# Patient Record
Sex: FEMALE | Race: WHITE | NOT HISPANIC OR LATINO
[De-identification: names, ages, dates, MRNs, and addresses within clinical notes are randomized per-mention and may not be internally consistent; named-entity substitution may affect disease eponyms.]

---

## 2019-12-02 PROBLEM — Z00.00 ENCOUNTER FOR PREVENTIVE HEALTH EXAMINATION: Status: ACTIVE | Noted: 2019-12-02

## 2019-12-04 ENCOUNTER — APPOINTMENT (OUTPATIENT)
Dept: SURGERY | Facility: CLINIC | Age: 59
End: 2019-12-04
Payer: COMMERCIAL

## 2019-12-04 VITALS
DIASTOLIC BLOOD PRESSURE: 84 MMHG | WEIGHT: 110 LBS | BODY MASS INDEX: 18.78 KG/M2 | HEIGHT: 64 IN | SYSTOLIC BLOOD PRESSURE: 123 MMHG | HEART RATE: 97 BPM

## 2019-12-04 DIAGNOSIS — F17.200 NICOTINE DEPENDENCE, UNSPECIFIED, UNCOMPLICATED: ICD-10-CM

## 2019-12-04 DIAGNOSIS — Z90.49 ACQUIRED ABSENCE OF OTHER SPECIFIED PARTS OF DIGESTIVE TRACT: ICD-10-CM

## 2019-12-04 PROCEDURE — 99024 POSTOP FOLLOW-UP VISIT: CPT

## 2019-12-04 NOTE — HISTORY OF PRESENT ILLNESS
[de-identified] : Patient returns s/p lap cholecystectomy for chronic cholecystitis on 11/21/19. She is doing well without fevers, chills, nausea, vomiting, diarrhea or pain. Her energy is normal.

## 2019-12-04 NOTE — PHYSICAL EXAM
[Alert] : alert [Calm] : calm [de-identified] : NAD, well-appearing [de-identified] : soft, NT ND with well-healing incisions without signs of infection or hernia

## 2022-01-01 ENCOUNTER — RESULT REVIEW (OUTPATIENT)
Age: 62
End: 2022-01-01

## 2022-01-01 ENCOUNTER — APPOINTMENT (OUTPATIENT)
Dept: RADIATION ONCOLOGY | Facility: CLINIC | Age: 62
End: 2022-01-01

## 2022-01-01 ENCOUNTER — TRANSCRIPTION ENCOUNTER (OUTPATIENT)
Age: 62
End: 2022-01-01

## 2022-01-01 ENCOUNTER — APPOINTMENT (OUTPATIENT)
Dept: ORTHOPEDIC SURGERY | Facility: HOSPITAL | Age: 62
End: 2022-01-01

## 2022-01-01 ENCOUNTER — INPATIENT (INPATIENT)
Facility: HOSPITAL | Age: 62
LOS: 14 days | Discharge: NURSING, REHAB OR LONG-TERM CARE | DRG: 470 | End: 2022-08-12
Attending: ORTHOPAEDIC SURGERY | Admitting: ORTHOPAEDIC SURGERY
Payer: COMMERCIAL

## 2022-01-01 ENCOUNTER — NON-APPOINTMENT (OUTPATIENT)
Age: 62
End: 2022-01-01

## 2022-01-01 ENCOUNTER — APPOINTMENT (OUTPATIENT)
Dept: THORACIC SURGERY | Facility: HOSPITAL | Age: 62
End: 2022-01-01

## 2022-01-01 ENCOUNTER — APPOINTMENT (OUTPATIENT)
Dept: GERIATRICS | Facility: CLINIC | Age: 62
End: 2022-01-01

## 2022-01-01 VITALS
DIASTOLIC BLOOD PRESSURE: 87 MMHG | TEMPERATURE: 98 F | HEART RATE: 114 BPM | HEIGHT: 62 IN | SYSTOLIC BLOOD PRESSURE: 126 MMHG | RESPIRATION RATE: 18 BRPM | OXYGEN SATURATION: 93 % | WEIGHT: 123.02 LBS

## 2022-01-01 VITALS — OXYGEN SATURATION: 95 % | HEART RATE: 112 BPM | TEMPERATURE: 98.6 F | RESPIRATION RATE: 18 BRPM

## 2022-01-01 VITALS
RESPIRATION RATE: 18 BRPM | DIASTOLIC BLOOD PRESSURE: 74 MMHG | HEART RATE: 109 BPM | OXYGEN SATURATION: 97 % | TEMPERATURE: 98 F | SYSTOLIC BLOOD PRESSURE: 111 MMHG

## 2022-01-01 VITALS
TEMPERATURE: 98 F | RESPIRATION RATE: 16 BRPM | HEART RATE: 93 BPM | SYSTOLIC BLOOD PRESSURE: 107 MMHG | OXYGEN SATURATION: 99 % | DIASTOLIC BLOOD PRESSURE: 66 MMHG

## 2022-01-01 DIAGNOSIS — C79.31 SECONDARY MALIGNANT NEOPLASM OF BRAIN: ICD-10-CM

## 2022-01-01 DIAGNOSIS — Z51.5 ENCOUNTER FOR PALLIATIVE CARE: ICD-10-CM

## 2022-01-01 DIAGNOSIS — C34.90 MALIGNANT NEOPLASM OF UNSPECIFIED PART OF UNSPECIFIED BRONCHUS OR LUNG: ICD-10-CM

## 2022-01-01 DIAGNOSIS — S72.001A FRACTURE OF UNSPECIFIED PART OF NECK OF RIGHT FEMUR, INITIAL ENCOUNTER FOR CLOSED FRACTURE: ICD-10-CM

## 2022-01-01 DIAGNOSIS — R91.8 OTHER NONSPECIFIC ABNORMAL FINDING OF LUNG FIELD: ICD-10-CM

## 2022-01-01 DIAGNOSIS — R53.81 OTHER MALAISE: ICD-10-CM

## 2022-01-01 DIAGNOSIS — K92.0 HEMATEMESIS: ICD-10-CM

## 2022-01-01 DIAGNOSIS — R00.0 TACHYCARDIA, UNSPECIFIED: ICD-10-CM

## 2022-01-01 DIAGNOSIS — K59.00 CONSTIPATION, UNSPECIFIED: ICD-10-CM

## 2022-01-01 DIAGNOSIS — Z47.89 ENCOUNTER FOR OTHER ORTHOPEDIC AFTERCARE: ICD-10-CM

## 2022-01-01 DIAGNOSIS — Z78.9 OTHER SPECIFIED HEALTH STATUS: ICD-10-CM

## 2022-01-01 DIAGNOSIS — R33.9 RETENTION OF URINE, UNSPECIFIED: ICD-10-CM

## 2022-01-01 DIAGNOSIS — C79.51 SECONDARY MALIGNANT NEOPLASM OF BONE: ICD-10-CM

## 2022-01-01 DIAGNOSIS — R52 PAIN, UNSPECIFIED: ICD-10-CM

## 2022-01-01 DIAGNOSIS — Z87.442 PERSONAL HISTORY OF URINARY CALCULI: ICD-10-CM

## 2022-01-01 DIAGNOSIS — M89.9 DISORDER OF BONE, UNSPECIFIED: ICD-10-CM

## 2022-01-01 LAB
-  AMIKACIN: SIGNIFICANT CHANGE UP
-  AMOXICILLIN/CLAVULANIC ACID: SIGNIFICANT CHANGE UP
-  AMPICILLIN/SULBACTAM: SIGNIFICANT CHANGE UP
-  AMPICILLIN: SIGNIFICANT CHANGE UP
-  AZTREONAM: SIGNIFICANT CHANGE UP
-  CEFAZOLIN: SIGNIFICANT CHANGE UP
-  CEFEPIME: SIGNIFICANT CHANGE UP
-  CEFOXITIN: SIGNIFICANT CHANGE UP
-  CEFTRIAXONE: SIGNIFICANT CHANGE UP
-  CIPROFLOXACIN: SIGNIFICANT CHANGE UP
-  ERTAPENEM: SIGNIFICANT CHANGE UP
-  GENTAMICIN: SIGNIFICANT CHANGE UP
-  IMIPENEM: SIGNIFICANT CHANGE UP
-  LEVOFLOXACIN: SIGNIFICANT CHANGE UP
-  MEROPENEM: SIGNIFICANT CHANGE UP
-  NITROFURANTOIN: SIGNIFICANT CHANGE UP
-  PIPERACILLIN/TAZOBACTAM: SIGNIFICANT CHANGE UP
-  TIGECYCLINE: SIGNIFICANT CHANGE UP
-  TOBRAMYCIN: SIGNIFICANT CHANGE UP
-  TRIMETHOPRIM/SULFAMETHOXAZOLE: SIGNIFICANT CHANGE UP
24R-OH-CALCIDIOL SERPL-MCNC: 29.5 NG/ML — LOW (ref 30–80)
ALBUMIN SERPL ELPH-MCNC: 2.8 G/DL — LOW (ref 3.3–5)
ALBUMIN SERPL ELPH-MCNC: 3.3 G/DL — SIGNIFICANT CHANGE UP (ref 3.3–5)
ALP SERPL-CCNC: 247 U/L — HIGH (ref 40–120)
ALT FLD-CCNC: 58 U/L — HIGH (ref 10–45)
ANION GAP SERPL CALC-SCNC: 11 MMOL/L — SIGNIFICANT CHANGE UP (ref 5–17)
ANION GAP SERPL CALC-SCNC: 13 MMOL/L — SIGNIFICANT CHANGE UP (ref 5–17)
ANION GAP SERPL CALC-SCNC: 13 MMOL/L — SIGNIFICANT CHANGE UP (ref 5–17)
ANION GAP SERPL CALC-SCNC: 14 MMOL/L — SIGNIFICANT CHANGE UP (ref 5–17)
ANION GAP SERPL CALC-SCNC: 15 MMOL/L — SIGNIFICANT CHANGE UP (ref 5–17)
ANION GAP SERPL CALC-SCNC: 7 MMOL/L — SIGNIFICANT CHANGE UP (ref 5–17)
ANION GAP SERPL CALC-SCNC: 8 MMOL/L — SIGNIFICANT CHANGE UP (ref 5–17)
ANION GAP SERPL CALC-SCNC: 8 MMOL/L — SIGNIFICANT CHANGE UP (ref 5–17)
APPEARANCE UR: CLEAR — SIGNIFICANT CHANGE UP
APTT BLD: 26.3 SEC — LOW (ref 27.5–35.5)
APTT BLD: 28.2 SEC — SIGNIFICANT CHANGE UP (ref 27.5–35.5)
APTT BLD: 32.6 SEC — SIGNIFICANT CHANGE UP (ref 27.5–35.5)
APTT BLD: 33.2 SEC — SIGNIFICANT CHANGE UP (ref 27.5–35.5)
AST SERPL-CCNC: 44 U/L — HIGH (ref 10–40)
BACTERIA # UR AUTO: ABNORMAL
BILIRUB SERPL-MCNC: 0.3 MG/DL — SIGNIFICANT CHANGE UP (ref 0.2–1.2)
BILIRUB UR-MCNC: NEGATIVE — SIGNIFICANT CHANGE UP
BLD GP AB SCN SERPL QL: NEGATIVE — SIGNIFICANT CHANGE UP
BLD GP AB SCN SERPL QL: NEGATIVE — SIGNIFICANT CHANGE UP
BUN SERPL-MCNC: 10 MG/DL — SIGNIFICANT CHANGE UP (ref 7–23)
BUN SERPL-MCNC: 11 MG/DL — SIGNIFICANT CHANGE UP (ref 7–23)
BUN SERPL-MCNC: 11 MG/DL — SIGNIFICANT CHANGE UP (ref 7–23)
BUN SERPL-MCNC: 14 MG/DL — SIGNIFICANT CHANGE UP (ref 7–23)
BUN SERPL-MCNC: 16 MG/DL — SIGNIFICANT CHANGE UP (ref 7–23)
BUN SERPL-MCNC: 18 MG/DL — SIGNIFICANT CHANGE UP (ref 7–23)
BUN SERPL-MCNC: 21 MG/DL — SIGNIFICANT CHANGE UP (ref 7–23)
BUN SERPL-MCNC: 22 MG/DL — SIGNIFICANT CHANGE UP (ref 7–23)
BUN SERPL-MCNC: 22 MG/DL — SIGNIFICANT CHANGE UP (ref 7–23)
BUN SERPL-MCNC: 29 MG/DL — HIGH (ref 7–23)
BUN SERPL-MCNC: 9 MG/DL — SIGNIFICANT CHANGE UP (ref 7–23)
CALCIUM SERPL-MCNC: 7.7 MG/DL — LOW (ref 8.4–10.5)
CALCIUM SERPL-MCNC: 7.8 MG/DL — LOW (ref 8.4–10.5)
CALCIUM SERPL-MCNC: 7.8 MG/DL — LOW (ref 8.4–10.5)
CALCIUM SERPL-MCNC: 8 MG/DL — LOW (ref 8.4–10.5)
CALCIUM SERPL-MCNC: 8.2 MG/DL — LOW (ref 8.4–10.5)
CALCIUM SERPL-MCNC: 8.2 MG/DL — LOW (ref 8.4–10.5)
CALCIUM SERPL-MCNC: 8.8 MG/DL — SIGNIFICANT CHANGE UP (ref 8.4–10.5)
CALCIUM SERPL-MCNC: 9.2 MG/DL — SIGNIFICANT CHANGE UP (ref 8.4–10.5)
CALCIUM SERPL-MCNC: 9.2 MG/DL — SIGNIFICANT CHANGE UP (ref 8.4–10.5)
CHLORIDE SERPL-SCNC: 100 MMOL/L — SIGNIFICANT CHANGE UP (ref 96–108)
CHLORIDE SERPL-SCNC: 92 MMOL/L — LOW (ref 96–108)
CHLORIDE SERPL-SCNC: 94 MMOL/L — LOW (ref 96–108)
CHLORIDE SERPL-SCNC: 94 MMOL/L — LOW (ref 96–108)
CHLORIDE SERPL-SCNC: 95 MMOL/L — LOW (ref 96–108)
CHLORIDE SERPL-SCNC: 95 MMOL/L — LOW (ref 96–108)
CHLORIDE SERPL-SCNC: 96 MMOL/L — SIGNIFICANT CHANGE UP (ref 96–108)
CHLORIDE SERPL-SCNC: 96 MMOL/L — SIGNIFICANT CHANGE UP (ref 96–108)
CHLORIDE SERPL-SCNC: 97 MMOL/L — SIGNIFICANT CHANGE UP (ref 96–108)
CHLORIDE SERPL-SCNC: 97 MMOL/L — SIGNIFICANT CHANGE UP (ref 96–108)
CHLORIDE SERPL-SCNC: 99 MMOL/L — SIGNIFICANT CHANGE UP (ref 96–108)
CO2 SERPL-SCNC: 22 MMOL/L — SIGNIFICANT CHANGE UP (ref 22–31)
CO2 SERPL-SCNC: 25 MMOL/L — SIGNIFICANT CHANGE UP (ref 22–31)
CO2 SERPL-SCNC: 26 MMOL/L — SIGNIFICANT CHANGE UP (ref 22–31)
CO2 SERPL-SCNC: 26 MMOL/L — SIGNIFICANT CHANGE UP (ref 22–31)
CO2 SERPL-SCNC: 27 MMOL/L — SIGNIFICANT CHANGE UP (ref 22–31)
CO2 SERPL-SCNC: 27 MMOL/L — SIGNIFICANT CHANGE UP (ref 22–31)
CO2 SERPL-SCNC: 28 MMOL/L — SIGNIFICANT CHANGE UP (ref 22–31)
CO2 SERPL-SCNC: 29 MMOL/L — SIGNIFICANT CHANGE UP (ref 22–31)
CO2 SERPL-SCNC: 31 MMOL/L — SIGNIFICANT CHANGE UP (ref 22–31)
CO2 SERPL-SCNC: 31 MMOL/L — SIGNIFICANT CHANGE UP (ref 22–31)
CO2 SERPL-SCNC: 37 MMOL/L — HIGH (ref 22–31)
COLOR SPEC: SIGNIFICANT CHANGE UP
CREAT SERPL-MCNC: 0.32 MG/DL — LOW (ref 0.5–1.3)
CREAT SERPL-MCNC: 0.37 MG/DL — LOW (ref 0.5–1.3)
CREAT SERPL-MCNC: 0.38 MG/DL — LOW (ref 0.5–1.3)
CREAT SERPL-MCNC: 0.41 MG/DL — LOW (ref 0.5–1.3)
CREAT SERPL-MCNC: 0.42 MG/DL — LOW (ref 0.5–1.3)
CREAT SERPL-MCNC: 0.45 MG/DL — LOW (ref 0.5–1.3)
CREAT SERPL-MCNC: 0.49 MG/DL — LOW (ref 0.5–1.3)
CREAT SERPL-MCNC: 0.53 MG/DL — SIGNIFICANT CHANGE UP (ref 0.5–1.3)
CREAT SERPL-MCNC: <0.3 MG/DL — LOW (ref 0.5–1.3)
CULTURE RESULTS: SIGNIFICANT CHANGE UP
DIFF PNL FLD: ABNORMAL
EGFR: 104 ML/MIN/1.73M2 — SIGNIFICANT CHANGE UP
EGFR: 106 ML/MIN/1.73M2 — SIGNIFICANT CHANGE UP
EGFR: 109 ML/MIN/1.73M2 — SIGNIFICANT CHANGE UP
EGFR: 111 ML/MIN/1.73M2 — SIGNIFICANT CHANGE UP
EGFR: 111 ML/MIN/1.73M2 — SIGNIFICANT CHANGE UP
EGFR: 113 ML/MIN/1.73M2 — SIGNIFICANT CHANGE UP
EGFR: 114 ML/MIN/1.73M2 — SIGNIFICANT CHANGE UP
EGFR: 118 ML/MIN/1.73M2 — SIGNIFICANT CHANGE UP
EGFR: 120 ML/MIN/1.73M2 — SIGNIFICANT CHANGE UP
EPI CELLS # UR: 0 /HPF — SIGNIFICANT CHANGE UP
GAS PNL BLDA: SIGNIFICANT CHANGE UP
GLUCOSE BLDC GLUCOMTR-MCNC: 180 MG/DL — HIGH (ref 70–99)
GLUCOSE SERPL-MCNC: 106 MG/DL — HIGH (ref 70–99)
GLUCOSE SERPL-MCNC: 107 MG/DL — HIGH (ref 70–99)
GLUCOSE SERPL-MCNC: 108 MG/DL — HIGH (ref 70–99)
GLUCOSE SERPL-MCNC: 115 MG/DL — HIGH (ref 70–99)
GLUCOSE SERPL-MCNC: 115 MG/DL — HIGH (ref 70–99)
GLUCOSE SERPL-MCNC: 123 MG/DL — HIGH (ref 70–99)
GLUCOSE SERPL-MCNC: 124 MG/DL — HIGH (ref 70–99)
GLUCOSE SERPL-MCNC: 128 MG/DL — HIGH (ref 70–99)
GLUCOSE SERPL-MCNC: 137 MG/DL — HIGH (ref 70–99)
GLUCOSE SERPL-MCNC: 141 MG/DL — HIGH (ref 70–99)
GLUCOSE SERPL-MCNC: 163 MG/DL — HIGH (ref 70–99)
GLUCOSE UR QL: NEGATIVE — SIGNIFICANT CHANGE UP
HCT VFR BLD CALC: 25.9 % — LOW (ref 34.5–45)
HCT VFR BLD CALC: 25.9 % — LOW (ref 34.5–45)
HCT VFR BLD CALC: 26.6 % — LOW (ref 34.5–45)
HCT VFR BLD CALC: 27 % — LOW (ref 34.5–45)
HCT VFR BLD CALC: 28 % — LOW (ref 34.5–45)
HCT VFR BLD CALC: 28.4 % — LOW (ref 34.5–45)
HCT VFR BLD CALC: 28.9 % — LOW (ref 34.5–45)
HCT VFR BLD CALC: 29.6 % — LOW (ref 34.5–45)
HCT VFR BLD CALC: 29.8 % — LOW (ref 34.5–45)
HCT VFR BLD CALC: 32 % — LOW (ref 34.5–45)
HCT VFR BLD CALC: 32.4 % — LOW (ref 34.5–45)
HCT VFR BLD CALC: 33.7 % — LOW (ref 34.5–45)
HCV AB S/CO SERPL IA: 0.1 S/CO — SIGNIFICANT CHANGE UP (ref 0–0.99)
HCV AB SERPL-IMP: SIGNIFICANT CHANGE UP
HGB BLD-MCNC: 10.2 G/DL — LOW (ref 11.5–15.5)
HGB BLD-MCNC: 10.3 G/DL — LOW (ref 11.5–15.5)
HGB BLD-MCNC: 10.7 G/DL — LOW (ref 11.5–15.5)
HGB BLD-MCNC: 8.2 G/DL — LOW (ref 11.5–15.5)
HGB BLD-MCNC: 8.3 G/DL — LOW (ref 11.5–15.5)
HGB BLD-MCNC: 8.6 G/DL — LOW (ref 11.5–15.5)
HGB BLD-MCNC: 8.7 G/DL — LOW (ref 11.5–15.5)
HGB BLD-MCNC: 9 G/DL — LOW (ref 11.5–15.5)
HGB BLD-MCNC: 9.1 G/DL — LOW (ref 11.5–15.5)
HGB BLD-MCNC: 9.2 G/DL — LOW (ref 11.5–15.5)
HGB BLD-MCNC: 9.4 G/DL — LOW (ref 11.5–15.5)
HGB BLD-MCNC: 9.8 G/DL — LOW (ref 11.5–15.5)
HYALINE CASTS # UR AUTO: 1 /LPF — SIGNIFICANT CHANGE UP (ref 0–2)
INR BLD: 1.48 RATIO — HIGH (ref 0.88–1.16)
INR BLD: 1.5 RATIO — HIGH (ref 0.88–1.16)
INR BLD: 1.52 RATIO — HIGH (ref 0.88–1.16)
INR BLD: 1.53 RATIO — HIGH (ref 0.88–1.16)
KETONES UR-MCNC: NEGATIVE — SIGNIFICANT CHANGE UP
LEUKOCYTE ESTERASE UR-ACNC: ABNORMAL
MCHC RBC-ENTMCNC: 28.5 PG — SIGNIFICANT CHANGE UP (ref 27–34)
MCHC RBC-ENTMCNC: 28.9 PG — SIGNIFICANT CHANGE UP (ref 27–34)
MCHC RBC-ENTMCNC: 29.4 PG — SIGNIFICANT CHANGE UP (ref 27–34)
MCHC RBC-ENTMCNC: 29.5 PG — SIGNIFICANT CHANGE UP (ref 27–34)
MCHC RBC-ENTMCNC: 29.7 PG — SIGNIFICANT CHANGE UP (ref 27–34)
MCHC RBC-ENTMCNC: 29.8 PG — SIGNIFICANT CHANGE UP (ref 27–34)
MCHC RBC-ENTMCNC: 29.8 PG — SIGNIFICANT CHANGE UP (ref 27–34)
MCHC RBC-ENTMCNC: 30.1 PG — SIGNIFICANT CHANGE UP (ref 27–34)
MCHC RBC-ENTMCNC: 30.1 PG — SIGNIFICANT CHANGE UP (ref 27–34)
MCHC RBC-ENTMCNC: 31.5 GM/DL — LOW (ref 32–36)
MCHC RBC-ENTMCNC: 31.5 GM/DL — LOW (ref 32–36)
MCHC RBC-ENTMCNC: 31.7 GM/DL — LOW (ref 32–36)
MCHC RBC-ENTMCNC: 31.8 GM/DL — LOW (ref 32–36)
MCHC RBC-ENTMCNC: 31.8 GM/DL — LOW (ref 32–36)
MCHC RBC-ENTMCNC: 32 GM/DL — SIGNIFICANT CHANGE UP (ref 32–36)
MCHC RBC-ENTMCNC: 32 GM/DL — SIGNIFICANT CHANGE UP (ref 32–36)
MCHC RBC-ENTMCNC: 32.1 GM/DL — SIGNIFICANT CHANGE UP (ref 32–36)
MCHC RBC-ENTMCNC: 32.2 GM/DL — SIGNIFICANT CHANGE UP (ref 32–36)
MCHC RBC-ENTMCNC: 32.2 GM/DL — SIGNIFICANT CHANGE UP (ref 32–36)
MCHC RBC-ENTMCNC: 32.3 GM/DL — SIGNIFICANT CHANGE UP (ref 32–36)
MCHC RBC-ENTMCNC: 33.1 GM/DL — SIGNIFICANT CHANGE UP (ref 32–36)
MCV RBC AUTO: 88.5 FL — SIGNIFICANT CHANGE UP (ref 80–100)
MCV RBC AUTO: 88.6 FL — SIGNIFICANT CHANGE UP (ref 80–100)
MCV RBC AUTO: 89.9 FL — SIGNIFICANT CHANGE UP (ref 80–100)
MCV RBC AUTO: 90.3 FL — SIGNIFICANT CHANGE UP (ref 80–100)
MCV RBC AUTO: 90.8 FL — SIGNIFICANT CHANGE UP (ref 80–100)
MCV RBC AUTO: 91.6 FL — SIGNIFICANT CHANGE UP (ref 80–100)
MCV RBC AUTO: 91.8 FL — SIGNIFICANT CHANGE UP (ref 80–100)
MCV RBC AUTO: 91.8 FL — SIGNIFICANT CHANGE UP (ref 80–100)
MCV RBC AUTO: 92 FL — SIGNIFICANT CHANGE UP (ref 80–100)
MCV RBC AUTO: 92.8 FL — SIGNIFICANT CHANGE UP (ref 80–100)
MCV RBC AUTO: 94.2 FL — SIGNIFICANT CHANGE UP (ref 80–100)
MCV RBC AUTO: 94.4 FL — SIGNIFICANT CHANGE UP (ref 80–100)
METHOD TYPE: SIGNIFICANT CHANGE UP
NITRITE UR-MCNC: NEGATIVE — SIGNIFICANT CHANGE UP
NRBC # BLD: 0 /100 WBCS — SIGNIFICANT CHANGE UP (ref 0–0)
ORGANISM # SPEC MICROSCOPIC CNT: SIGNIFICANT CHANGE UP
ORGANISM # SPEC MICROSCOPIC CNT: SIGNIFICANT CHANGE UP
PH UR: 7.5 — SIGNIFICANT CHANGE UP (ref 5–8)
PLATELET # BLD AUTO: 316 K/UL — SIGNIFICANT CHANGE UP (ref 150–400)
PLATELET # BLD AUTO: 347 K/UL — SIGNIFICANT CHANGE UP (ref 150–400)
PLATELET # BLD AUTO: 385 K/UL — SIGNIFICANT CHANGE UP (ref 150–400)
PLATELET # BLD AUTO: 426 K/UL — HIGH (ref 150–400)
PLATELET # BLD AUTO: 430 K/UL — HIGH (ref 150–400)
PLATELET # BLD AUTO: 460 K/UL — HIGH (ref 150–400)
PLATELET # BLD AUTO: 478 K/UL — HIGH (ref 150–400)
PLATELET # BLD AUTO: 501 K/UL — HIGH (ref 150–400)
PLATELET # BLD AUTO: 517 K/UL — HIGH (ref 150–400)
PLATELET # BLD AUTO: 519 K/UL — HIGH (ref 150–400)
PLATELET # BLD AUTO: 592 K/UL — HIGH (ref 150–400)
PLATELET # BLD AUTO: 596 K/UL — HIGH (ref 150–400)
POTASSIUM SERPL-MCNC: 3.7 MMOL/L — SIGNIFICANT CHANGE UP (ref 3.5–5.3)
POTASSIUM SERPL-MCNC: 3.7 MMOL/L — SIGNIFICANT CHANGE UP (ref 3.5–5.3)
POTASSIUM SERPL-MCNC: 3.8 MMOL/L — SIGNIFICANT CHANGE UP (ref 3.5–5.3)
POTASSIUM SERPL-MCNC: 3.8 MMOL/L — SIGNIFICANT CHANGE UP (ref 3.5–5.3)
POTASSIUM SERPL-MCNC: 4 MMOL/L — SIGNIFICANT CHANGE UP (ref 3.5–5.3)
POTASSIUM SERPL-MCNC: 4 MMOL/L — SIGNIFICANT CHANGE UP (ref 3.5–5.3)
POTASSIUM SERPL-MCNC: 4.1 MMOL/L — SIGNIFICANT CHANGE UP (ref 3.5–5.3)
POTASSIUM SERPL-MCNC: 4.4 MMOL/L — SIGNIFICANT CHANGE UP (ref 3.5–5.3)
POTASSIUM SERPL-MCNC: 4.9 MMOL/L — SIGNIFICANT CHANGE UP (ref 3.5–5.3)
POTASSIUM SERPL-SCNC: 3.7 MMOL/L — SIGNIFICANT CHANGE UP (ref 3.5–5.3)
POTASSIUM SERPL-SCNC: 3.7 MMOL/L — SIGNIFICANT CHANGE UP (ref 3.5–5.3)
POTASSIUM SERPL-SCNC: 3.8 MMOL/L — SIGNIFICANT CHANGE UP (ref 3.5–5.3)
POTASSIUM SERPL-SCNC: 3.8 MMOL/L — SIGNIFICANT CHANGE UP (ref 3.5–5.3)
POTASSIUM SERPL-SCNC: 4 MMOL/L — SIGNIFICANT CHANGE UP (ref 3.5–5.3)
POTASSIUM SERPL-SCNC: 4 MMOL/L — SIGNIFICANT CHANGE UP (ref 3.5–5.3)
POTASSIUM SERPL-SCNC: 4.1 MMOL/L — SIGNIFICANT CHANGE UP (ref 3.5–5.3)
POTASSIUM SERPL-SCNC: 4.4 MMOL/L — SIGNIFICANT CHANGE UP (ref 3.5–5.3)
POTASSIUM SERPL-SCNC: 4.9 MMOL/L — SIGNIFICANT CHANGE UP (ref 3.5–5.3)
PROT SERPL-MCNC: 6.9 G/DL — SIGNIFICANT CHANGE UP (ref 6–8.3)
PROT UR-MCNC: NEGATIVE — SIGNIFICANT CHANGE UP
PROTHROM AB SERPL-ACNC: 17.1 SEC — HIGH (ref 10.5–13.4)
PROTHROM AB SERPL-ACNC: 17.5 SEC — HIGH (ref 10.5–13.4)
PROTHROM AB SERPL-ACNC: 17.5 SEC — HIGH (ref 10.5–13.4)
PROTHROM AB SERPL-ACNC: 17.8 SEC — HIGH (ref 10.5–13.4)
RBC # BLD: 2.75 M/UL — LOW (ref 3.8–5.2)
RBC # BLD: 2.79 M/UL — LOW (ref 3.8–5.2)
RBC # BLD: 2.89 M/UL — LOW (ref 3.8–5.2)
RBC # BLD: 3.05 M/UL — LOW (ref 3.8–5.2)
RBC # BLD: 3.05 M/UL — LOW (ref 3.8–5.2)
RBC # BLD: 3.06 M/UL — LOW (ref 3.8–5.2)
RBC # BLD: 3.1 M/UL — LOW (ref 3.8–5.2)
RBC # BLD: 3.26 M/UL — LOW (ref 3.8–5.2)
RBC # BLD: 3.3 M/UL — LOW (ref 3.8–5.2)
RBC # BLD: 3.53 M/UL — LOW (ref 3.8–5.2)
RBC # BLD: 3.61 M/UL — LOW (ref 3.8–5.2)
RBC # BLD: 3.75 M/UL — LOW (ref 3.8–5.2)
RBC # FLD: 13.6 % — SIGNIFICANT CHANGE UP (ref 10.3–14.5)
RBC # FLD: 13.8 % — SIGNIFICANT CHANGE UP (ref 10.3–14.5)
RBC # FLD: 14 % — SIGNIFICANT CHANGE UP (ref 10.3–14.5)
RBC # FLD: 14 % — SIGNIFICANT CHANGE UP (ref 10.3–14.5)
RBC # FLD: 14.1 % — SIGNIFICANT CHANGE UP (ref 10.3–14.5)
RBC # FLD: 14.1 % — SIGNIFICANT CHANGE UP (ref 10.3–14.5)
RBC # FLD: 14.9 % — HIGH (ref 10.3–14.5)
RBC # FLD: 15 % — HIGH (ref 10.3–14.5)
RBC # FLD: 15.4 % — HIGH (ref 10.3–14.5)
RBC # FLD: 15.5 % — HIGH (ref 10.3–14.5)
RBC # FLD: 15.9 % — HIGH (ref 10.3–14.5)
RBC # FLD: 15.9 % — HIGH (ref 10.3–14.5)
RBC CASTS # UR COMP ASSIST: 4 /HPF — SIGNIFICANT CHANGE UP (ref 0–4)
RH IG SCN BLD-IMP: POSITIVE — SIGNIFICANT CHANGE UP
RH IG SCN BLD-IMP: POSITIVE — SIGNIFICANT CHANGE UP
SARS-COV-2 RNA SPEC QL NAA+PROBE: SIGNIFICANT CHANGE UP
SODIUM SERPL-SCNC: 133 MMOL/L — LOW (ref 135–145)
SODIUM SERPL-SCNC: 134 MMOL/L — LOW (ref 135–145)
SODIUM SERPL-SCNC: 134 MMOL/L — LOW (ref 135–145)
SODIUM SERPL-SCNC: 135 MMOL/L — SIGNIFICANT CHANGE UP (ref 135–145)
SODIUM SERPL-SCNC: 136 MMOL/L — SIGNIFICANT CHANGE UP (ref 135–145)
SODIUM SERPL-SCNC: 136 MMOL/L — SIGNIFICANT CHANGE UP (ref 135–145)
SODIUM SERPL-SCNC: 137 MMOL/L — SIGNIFICANT CHANGE UP (ref 135–145)
SP GR SPEC: 1.01 — LOW (ref 1.01–1.02)
SPECIMEN SOURCE: SIGNIFICANT CHANGE UP
SURGICAL PATHOLOGY STUDY: SIGNIFICANT CHANGE UP
UROBILINOGEN FLD QL: NEGATIVE — SIGNIFICANT CHANGE UP
WBC # BLD: 11.31 K/UL — HIGH (ref 3.8–10.5)
WBC # BLD: 11.61 K/UL — HIGH (ref 3.8–10.5)
WBC # BLD: 14.71 K/UL — HIGH (ref 3.8–10.5)
WBC # BLD: 14.85 K/UL — HIGH (ref 3.8–10.5)
WBC # BLD: 16.33 K/UL — HIGH (ref 3.8–10.5)
WBC # BLD: 18.18 K/UL — HIGH (ref 3.8–10.5)
WBC # BLD: 18.59 K/UL — HIGH (ref 3.8–10.5)
WBC # BLD: 21.21 K/UL — HIGH (ref 3.8–10.5)
WBC # BLD: 21.52 K/UL — HIGH (ref 3.8–10.5)
WBC # BLD: 22.22 K/UL — HIGH (ref 3.8–10.5)
WBC # BLD: 22.89 K/UL — HIGH (ref 3.8–10.5)
WBC # BLD: 9.39 K/UL — SIGNIFICANT CHANGE UP (ref 3.8–10.5)
WBC # FLD AUTO: 11.31 K/UL — HIGH (ref 3.8–10.5)
WBC # FLD AUTO: 11.61 K/UL — HIGH (ref 3.8–10.5)
WBC # FLD AUTO: 14.71 K/UL — HIGH (ref 3.8–10.5)
WBC # FLD AUTO: 14.85 K/UL — HIGH (ref 3.8–10.5)
WBC # FLD AUTO: 16.33 K/UL — HIGH (ref 3.8–10.5)
WBC # FLD AUTO: 18.18 K/UL — HIGH (ref 3.8–10.5)
WBC # FLD AUTO: 18.59 K/UL — HIGH (ref 3.8–10.5)
WBC # FLD AUTO: 21.21 K/UL — HIGH (ref 3.8–10.5)
WBC # FLD AUTO: 21.52 K/UL — HIGH (ref 3.8–10.5)
WBC # FLD AUTO: 22.22 K/UL — HIGH (ref 3.8–10.5)
WBC # FLD AUTO: 22.89 K/UL — HIGH (ref 3.8–10.5)
WBC # FLD AUTO: 9.39 K/UL — SIGNIFICANT CHANGE UP (ref 3.8–10.5)
WBC UR QL: 60 /HPF — HIGH (ref 0–5)

## 2022-01-01 PROCEDURE — 99233 SBSQ HOSP IP/OBS HIGH 50: CPT

## 2022-01-01 PROCEDURE — 81001 URINALYSIS AUTO W/SCOPE: CPT

## 2022-01-01 PROCEDURE — 97110 THERAPEUTIC EXERCISES: CPT

## 2022-01-01 PROCEDURE — 82435 ASSAY OF BLOOD CHLORIDE: CPT

## 2022-01-01 PROCEDURE — 82947 ASSAY GLUCOSE BLOOD QUANT: CPT

## 2022-01-01 PROCEDURE — 93005 ELECTROCARDIOGRAM TRACING: CPT

## 2022-01-01 PROCEDURE — 72170 X-RAY EXAM OF PELVIS: CPT

## 2022-01-01 PROCEDURE — 84295 ASSAY OF SERUM SODIUM: CPT

## 2022-01-01 PROCEDURE — 82803 BLOOD GASES ANY COMBINATION: CPT

## 2022-01-01 PROCEDURE — 71275 CT ANGIOGRAPHY CHEST: CPT | Mod: 26

## 2022-01-01 PROCEDURE — 99205 OFFICE O/P NEW HI 60 MIN: CPT | Mod: 25

## 2022-01-01 PROCEDURE — 97535 SELF CARE MNGMENT TRAINING: CPT

## 2022-01-01 PROCEDURE — 77075 RADEX OSSEOUS SURVEY COMPL: CPT | Mod: 26

## 2022-01-01 PROCEDURE — 85025 COMPLETE CBC W/AUTO DIFF WBC: CPT

## 2022-01-01 PROCEDURE — 71045 X-RAY EXAM CHEST 1 VIEW: CPT | Mod: 26

## 2022-01-01 PROCEDURE — 74176 CT ABD & PELVIS W/O CONTRAST: CPT

## 2022-01-01 PROCEDURE — A9561: CPT

## 2022-01-01 PROCEDURE — 82040 ASSAY OF SERUM ALBUMIN: CPT

## 2022-01-01 PROCEDURE — C1769: CPT

## 2022-01-01 PROCEDURE — 86803 HEPATITIS C AB TEST: CPT

## 2022-01-01 PROCEDURE — 27076 RESECT HIP TUM INCL ACETABUL: CPT | Mod: 80,RT

## 2022-01-01 PROCEDURE — 78306 BONE IMAGING WHOLE BODY: CPT

## 2022-01-01 PROCEDURE — 86923 COMPATIBILITY TEST ELECTRIC: CPT

## 2022-01-01 PROCEDURE — 88304 TISSUE EXAM BY PATHOLOGIST: CPT

## 2022-01-01 PROCEDURE — 93970 EXTREMITY STUDY: CPT | Mod: 26

## 2022-01-01 PROCEDURE — 78830 RP LOCLZJ TUM SPECT W/CT 1: CPT

## 2022-01-01 PROCEDURE — 27130 TOTAL HIP ARTHROPLASTY: CPT | Mod: 80,RT

## 2022-01-01 PROCEDURE — 85014 HEMATOCRIT: CPT

## 2022-01-01 PROCEDURE — 87086 URINE CULTURE/COLONY COUNT: CPT

## 2022-01-01 PROCEDURE — U0005: CPT

## 2022-01-01 PROCEDURE — 99232 SBSQ HOSP IP/OBS MODERATE 35: CPT

## 2022-01-01 PROCEDURE — C1889: CPT

## 2022-01-01 PROCEDURE — 74176 CT ABD & PELVIS W/O CONTRAST: CPT | Mod: 26

## 2022-01-01 PROCEDURE — 97116 GAIT TRAINING THERAPY: CPT

## 2022-01-01 PROCEDURE — 80048 BASIC METABOLIC PNL TOTAL CA: CPT

## 2022-01-01 PROCEDURE — 99232 SBSQ HOSP IP/OBS MODERATE 35: CPT | Mod: GC

## 2022-01-01 PROCEDURE — U0003: CPT

## 2022-01-01 PROCEDURE — 99223 1ST HOSP IP/OBS HIGH 75: CPT | Mod: GC

## 2022-01-01 PROCEDURE — 99358 PROLONG SERVICE W/O CONTACT: CPT

## 2022-01-01 PROCEDURE — 86850 RBC ANTIBODY SCREEN: CPT

## 2022-01-01 PROCEDURE — 85027 COMPLETE CBC AUTOMATED: CPT

## 2022-01-01 PROCEDURE — 27076 RESECT HIP TUM INCL ACETABUL: CPT | Mod: RT

## 2022-01-01 PROCEDURE — 82306 VITAMIN D 25 HYDROXY: CPT

## 2022-01-01 PROCEDURE — 93970 EXTREMITY STUDY: CPT

## 2022-01-01 PROCEDURE — 87186 SC STD MICRODIL/AGAR DIL: CPT

## 2022-01-01 PROCEDURE — 88304 TISSUE EXAM BY PATHOLOGIST: CPT | Mod: 26

## 2022-01-01 PROCEDURE — 99222 1ST HOSP IP/OBS MODERATE 55: CPT

## 2022-01-01 PROCEDURE — 87077 CULTURE AEROBIC IDENTIFY: CPT

## 2022-01-01 PROCEDURE — 80053 COMPREHEN METABOLIC PANEL: CPT

## 2022-01-01 PROCEDURE — 82565 ASSAY OF CREATININE: CPT

## 2022-01-01 PROCEDURE — 85610 PROTHROMBIN TIME: CPT

## 2022-01-01 PROCEDURE — 86900 BLOOD TYPING SEROLOGIC ABO: CPT

## 2022-01-01 PROCEDURE — 97166 OT EVAL MOD COMPLEX 45 MIN: CPT

## 2022-01-01 PROCEDURE — 97530 THERAPEUTIC ACTIVITIES: CPT

## 2022-01-01 PROCEDURE — 71275 CT ANGIOGRAPHY CHEST: CPT

## 2022-01-01 PROCEDURE — 82962 GLUCOSE BLOOD TEST: CPT

## 2022-01-01 PROCEDURE — 84132 ASSAY OF SERUM POTASSIUM: CPT

## 2022-01-01 PROCEDURE — 93010 ELECTROCARDIOGRAM REPORT: CPT

## 2022-01-01 PROCEDURE — 72170 X-RAY EXAM OF PELVIS: CPT | Mod: 26

## 2022-01-01 PROCEDURE — 97162 PT EVAL MOD COMPLEX 30 MIN: CPT

## 2022-01-01 PROCEDURE — 36430 TRANSFUSION BLD/BLD COMPNT: CPT

## 2022-01-01 PROCEDURE — 77075 RADEX OSSEOUS SURVEY COMPL: CPT

## 2022-01-01 PROCEDURE — 71045 X-RAY EXAM CHEST 1 VIEW: CPT

## 2022-01-01 PROCEDURE — 85018 HEMOGLOBIN: CPT

## 2022-01-01 PROCEDURE — P9059: CPT

## 2022-01-01 PROCEDURE — 86901 BLOOD TYPING SEROLOGIC RH(D): CPT

## 2022-01-01 PROCEDURE — P9016: CPT

## 2022-01-01 PROCEDURE — C1776: CPT

## 2022-01-01 PROCEDURE — 82330 ASSAY OF CALCIUM: CPT

## 2022-01-01 PROCEDURE — C1713: CPT

## 2022-01-01 PROCEDURE — 85730 THROMBOPLASTIN TIME PARTIAL: CPT

## 2022-01-01 PROCEDURE — 88311 DECALCIFY TISSUE: CPT

## 2022-01-01 PROCEDURE — 78306 BONE IMAGING WHOLE BODY: CPT | Mod: 26

## 2022-01-01 PROCEDURE — 36415 COLL VENOUS BLD VENIPUNCTURE: CPT

## 2022-01-01 PROCEDURE — 87635 SARS-COV-2 COVID-19 AMP PRB: CPT

## 2022-01-01 PROCEDURE — 78830 RP LOCLZJ TUM SPECT W/CT 1: CPT | Mod: 26

## 2022-01-01 PROCEDURE — 88311 DECALCIFY TISSUE: CPT | Mod: 26

## 2022-01-01 PROCEDURE — 99233 SBSQ HOSP IP/OBS HIGH 50: CPT | Mod: GC

## 2022-01-01 PROCEDURE — 83605 ASSAY OF LACTIC ACID: CPT

## 2022-01-01 PROCEDURE — 27130 TOTAL HIP ARTHROPLASTY: CPT | Mod: RT

## 2022-01-01 DEVICE — IMPLANTABLE DEVICE: Type: IMPLANTABLE DEVICE | Site: RIGHT | Status: FUNCTIONAL

## 2022-01-01 DEVICE — SCREW BONE CANN PINNACLE 6.5X15MM: Type: IMPLANTABLE DEVICE | Site: RIGHT | Status: FUNCTIONAL

## 2022-01-01 DEVICE — CEMENT PALACOS R: Type: IMPLANTABLE DEVICE | Site: RIGHT | Status: FUNCTIONAL

## 2022-01-01 DEVICE — HEAD FEM LFIT V40 22.2MM STD NK: Type: IMPLANTABLE DEVICE | Site: RIGHT | Status: FUNCTIONAL

## 2022-01-01 DEVICE — SCREW ACET CANC PINN 6.5X20MM: Type: IMPLANTABLE DEVICE | Site: RIGHT | Status: FUNCTIONAL

## 2022-01-01 DEVICE — SCREW ACET CANC PINN 6.5X25MM: Type: IMPLANTABLE DEVICE | Site: RIGHT | Status: FUNCTIONAL

## 2022-01-01 DEVICE — LINER MDM CEMENTLESS: Type: IMPLANTABLE DEVICE | Site: RIGHT | Status: FUNCTIONAL

## 2022-01-01 DEVICE — KIT A-LINE 1LUM 20G X 12CM SAFE KIT: Type: IMPLANTABLE DEVICE | Site: RIGHT | Status: FUNCTIONAL

## 2022-01-01 RX ORDER — MORPHINE SULFATE 50 MG/1
15 CAPSULE, EXTENDED RELEASE ORAL
Refills: 0 | Status: DISCONTINUED | OUTPATIENT
Start: 2022-01-01 | End: 2022-01-01

## 2022-01-01 RX ORDER — HYDROMORPHONE HYDROCHLORIDE 2 MG/ML
2.5 INJECTION INTRAMUSCULAR; INTRAVENOUS; SUBCUTANEOUS
Refills: 0 | Status: DISCONTINUED | OUTPATIENT
Start: 2022-01-01 | End: 2022-01-01

## 2022-01-01 RX ORDER — NALBUPHINE HYDROCHLORIDE 10 MG/ML
2.5 INJECTION, SOLUTION INTRAMUSCULAR; INTRAVENOUS; SUBCUTANEOUS EVERY 6 HOURS
Refills: 0 | Status: DISCONTINUED | OUTPATIENT
Start: 2022-01-01 | End: 2022-01-01

## 2022-01-01 RX ORDER — ACETAMINOPHEN 500 MG
1000 TABLET ORAL ONCE
Refills: 0 | Status: COMPLETED | OUTPATIENT
Start: 2022-01-01 | End: 2022-01-01

## 2022-01-01 RX ORDER — FENTANYL CITRATE 50 UG/ML
1 INJECTION INTRAVENOUS
Refills: 0 | Status: DISCONTINUED | OUTPATIENT
Start: 2022-01-01 | End: 2022-01-01

## 2022-01-01 RX ORDER — NALOXEGOL OXALATE 12.5 MG/1
25 TABLET, FILM COATED ORAL ONCE
Refills: 0 | Status: COMPLETED | OUTPATIENT
Start: 2022-01-01 | End: 2022-01-01

## 2022-01-01 RX ORDER — PANTOPRAZOLE SODIUM 20 MG/1
40 TABLET, DELAYED RELEASE ORAL EVERY 12 HOURS
Refills: 0 | Status: DISCONTINUED | OUTPATIENT
Start: 2022-01-01 | End: 2022-01-01

## 2022-01-01 RX ORDER — DIPHENHYDRAMINE HCL 50 MG
25 CAPSULE ORAL ONCE
Refills: 0 | Status: COMPLETED | OUTPATIENT
Start: 2022-01-01 | End: 2022-01-01

## 2022-01-01 RX ORDER — ENOXAPARIN SODIUM 100 MG/ML
40 INJECTION SUBCUTANEOUS EVERY 24 HOURS
Refills: 0 | Status: DISCONTINUED | OUTPATIENT
Start: 2022-01-01 | End: 2022-01-01

## 2022-01-01 RX ORDER — ACETAMINOPHEN 500 MG
3 TABLET ORAL
Qty: 0 | Refills: 0 | DISCHARGE

## 2022-01-01 RX ORDER — OXYCODONE HYDROCHLORIDE 5 MG/1
2.5 TABLET ORAL EVERY 4 HOURS
Refills: 0 | Status: DISCONTINUED | OUTPATIENT
Start: 2022-01-01 | End: 2022-01-01

## 2022-01-01 RX ORDER — TRAMADOL HYDROCHLORIDE 50 MG/1
50 TABLET, COATED ORAL
Refills: 0 | Status: ACTIVE | COMMUNITY

## 2022-01-01 RX ORDER — HYDROMORPHONE HYDROCHLORIDE 2 MG/ML
1 INJECTION INTRAMUSCULAR; INTRAVENOUS; SUBCUTANEOUS EVERY 6 HOURS
Refills: 0 | Status: DISCONTINUED | OUTPATIENT
Start: 2022-01-01 | End: 2022-01-01

## 2022-01-01 RX ORDER — ACETAMINOPHEN 500 MG
975 TABLET ORAL EVERY 8 HOURS
Refills: 0 | Status: DISCONTINUED | OUTPATIENT
Start: 2022-01-01 | End: 2022-01-01

## 2022-01-01 RX ORDER — POLYETHYLENE GLYCOL 3350 17 G/17G
17 POWDER, FOR SOLUTION ORAL AT BEDTIME
Refills: 0 | Status: DISCONTINUED | OUTPATIENT
Start: 2022-01-01 | End: 2022-01-01

## 2022-01-01 RX ORDER — DEXAMETHASONE 0.5 MG/5ML
10 ELIXIR ORAL ONCE
Refills: 0 | Status: COMPLETED | OUTPATIENT
Start: 2022-01-01 | End: 2022-01-01

## 2022-01-01 RX ORDER — DIAZEPAM 5 MG
5 TABLET ORAL THREE TIMES A DAY
Refills: 0 | Status: DISCONTINUED | OUTPATIENT
Start: 2022-01-01 | End: 2022-01-01

## 2022-01-01 RX ORDER — OXYCODONE HYDROCHLORIDE 5 MG/1
10 TABLET ORAL EVERY 4 HOURS
Refills: 0 | Status: DISCONTINUED | OUTPATIENT
Start: 2022-01-01 | End: 2022-01-01

## 2022-01-01 RX ORDER — CIPROFLOXACIN LACTATE 400MG/40ML
500 VIAL (ML) INTRAVENOUS EVERY 12 HOURS
Refills: 0 | Status: DISCONTINUED | OUTPATIENT
Start: 2022-01-01 | End: 2022-01-01

## 2022-01-01 RX ORDER — SENNA PLUS 8.6 MG/1
2 TABLET ORAL EVERY 12 HOURS
Refills: 0 | Status: DISCONTINUED | OUTPATIENT
Start: 2022-01-01 | End: 2022-01-01

## 2022-01-01 RX ORDER — OXYCODONE HYDROCHLORIDE 5 MG/1
5 TABLET ORAL EVERY 4 HOURS
Refills: 0 | Status: DISCONTINUED | OUTPATIENT
Start: 2022-01-01 | End: 2022-01-01

## 2022-01-01 RX ORDER — HYDROMORPHONE HYDROCHLORIDE 2 MG/ML
1.5 INJECTION INTRAMUSCULAR; INTRAVENOUS; SUBCUTANEOUS
Refills: 0 | Status: DISCONTINUED | OUTPATIENT
Start: 2022-01-01 | End: 2022-01-01

## 2022-01-01 RX ORDER — PANTOPRAZOLE SODIUM 20 MG/1
40 TABLET, DELAYED RELEASE ORAL
Refills: 0 | Status: DISCONTINUED | OUTPATIENT
Start: 2022-01-01 | End: 2022-01-01

## 2022-01-01 RX ORDER — SENNA PLUS 8.6 MG/1
2 TABLET ORAL AT BEDTIME
Refills: 0 | Status: DISCONTINUED | OUTPATIENT
Start: 2022-01-01 | End: 2022-01-01

## 2022-01-01 RX ORDER — NALOXONE HYDROCHLORIDE 4 MG/.1ML
0.1 SPRAY NASAL
Refills: 0 | Status: DISCONTINUED | OUTPATIENT
Start: 2022-01-01 | End: 2022-01-01

## 2022-01-01 RX ORDER — HYDROMORPHONE HYDROCHLORIDE 2 MG/ML
2 INJECTION INTRAMUSCULAR; INTRAVENOUS; SUBCUTANEOUS
Refills: 0 | Status: DISCONTINUED | OUTPATIENT
Start: 2022-01-01 | End: 2022-01-01

## 2022-01-01 RX ORDER — FENTANYL CITRATE 50 UG/ML
1 INJECTION INTRAVENOUS
Qty: 0 | Refills: 0 | DISCHARGE
Start: 2022-01-01

## 2022-01-01 RX ORDER — CIPROFLOXACIN LACTATE 400MG/40ML
1 VIAL (ML) INTRAVENOUS
Qty: 0 | Refills: 0 | DISCHARGE
Start: 2022-01-01 | End: 2022-01-01

## 2022-01-01 RX ORDER — MAGNESIUM HYDROXIDE 400 MG/1
30 TABLET, CHEWABLE ORAL DAILY
Refills: 0 | Status: DISCONTINUED | OUTPATIENT
Start: 2022-01-01 | End: 2022-01-01

## 2022-01-01 RX ORDER — HYDROMORPHONE HYDROCHLORIDE 2 MG/ML
2 INJECTION INTRAMUSCULAR; INTRAVENOUS; SUBCUTANEOUS ONCE
Refills: 0 | Status: COMPLETED | OUTPATIENT
Start: 2022-01-01 | End: 2022-01-01

## 2022-01-01 RX ORDER — SODIUM CHLORIDE 9 MG/ML
1000 INJECTION INTRAMUSCULAR; INTRAVENOUS; SUBCUTANEOUS
Refills: 0 | Status: DISCONTINUED | OUTPATIENT
Start: 2022-01-01 | End: 2022-01-01

## 2022-01-01 RX ORDER — LANOLIN ALCOHOL/MO/W.PET/CERES
5 CREAM (GRAM) TOPICAL AT BEDTIME
Refills: 0 | Status: DISCONTINUED | OUTPATIENT
Start: 2022-01-01 | End: 2022-01-01

## 2022-01-01 RX ORDER — HYDROMORPHONE HYDROCHLORIDE 2 MG/ML
8 INJECTION INTRAMUSCULAR; INTRAVENOUS; SUBCUTANEOUS EVERY 4 HOURS
Refills: 0 | Status: DISCONTINUED | OUTPATIENT
Start: 2022-01-01 | End: 2022-01-01

## 2022-01-01 RX ORDER — NALOXEGOL OXALATE 12.5 MG/1
1 TABLET, FILM COATED ORAL
Qty: 0 | Refills: 0 | DISCHARGE
Start: 2022-01-01

## 2022-01-01 RX ORDER — SODIUM CHLORIDE 9 MG/ML
1000 INJECTION, SOLUTION INTRAVENOUS
Refills: 0 | Status: DISCONTINUED | OUTPATIENT
Start: 2022-01-01 | End: 2022-01-01

## 2022-01-01 RX ORDER — FAMOTIDINE 10 MG/ML
20 INJECTION INTRAVENOUS
Refills: 0 | Status: DISCONTINUED | OUTPATIENT
Start: 2022-01-01 | End: 2022-01-01

## 2022-01-01 RX ORDER — METHYLPREDNISOLONE 4 MG/1
4 TABLET ORAL
Qty: 1 | Refills: 0 | Status: ACTIVE | COMMUNITY
Start: 2022-01-01 | End: 1900-01-01

## 2022-01-01 RX ORDER — POLYETHYLENE GLYCOL 3350 17 G/17G
17 POWDER, FOR SOLUTION ORAL
Qty: 0 | Refills: 0 | DISCHARGE
Start: 2022-01-01

## 2022-01-01 RX ORDER — METOCLOPRAMIDE HCL 10 MG
10 TABLET ORAL THREE TIMES A DAY
Refills: 0 | Status: DISCONTINUED | OUTPATIENT
Start: 2022-01-01 | End: 2022-01-01

## 2022-01-01 RX ORDER — ONDANSETRON 8 MG/1
4 TABLET, FILM COATED ORAL EVERY 6 HOURS
Refills: 0 | Status: DISCONTINUED | OUTPATIENT
Start: 2022-01-01 | End: 2022-01-01

## 2022-01-01 RX ORDER — MAGNESIUM HYDROXIDE 400 MG/1
30 TABLET, CHEWABLE ORAL
Qty: 0 | Refills: 0 | DISCHARGE
Start: 2022-01-01

## 2022-01-01 RX ORDER — NALOXONE HYDROCHLORIDE 4 MG/.1ML
0.3 SPRAY NASAL
Refills: 0 | Status: DISCONTINUED | OUTPATIENT
Start: 2022-01-01 | End: 2022-01-01

## 2022-01-01 RX ORDER — METOCLOPRAMIDE HCL 10 MG
10 TABLET ORAL EVERY 8 HOURS
Refills: 0 | Status: DISCONTINUED | OUTPATIENT
Start: 2022-01-01 | End: 2022-01-01

## 2022-01-01 RX ORDER — HYDROMORPHONE HYDROCHLORIDE 2 MG/ML
30 INJECTION INTRAMUSCULAR; INTRAVENOUS; SUBCUTANEOUS
Refills: 0 | Status: DISCONTINUED | OUTPATIENT
Start: 2022-01-01 | End: 2022-01-01

## 2022-01-01 RX ORDER — NALOXEGOL OXALATE 12.5 MG/1
25 TABLET, FILM COATED ORAL
Refills: 0 | Status: DISCONTINUED | OUTPATIENT
Start: 2022-01-01 | End: 2022-01-01

## 2022-01-01 RX ORDER — HYDROMORPHONE HYDROCHLORIDE 2 MG/ML
1.5 INJECTION INTRAMUSCULAR; INTRAVENOUS; SUBCUTANEOUS ONCE
Refills: 0 | Status: DISCONTINUED | OUTPATIENT
Start: 2022-01-01 | End: 2022-01-01

## 2022-01-01 RX ORDER — SODIUM CHLORIDE 9 MG/ML
500 INJECTION, SOLUTION INTRAVENOUS ONCE
Refills: 0 | Status: COMPLETED | OUTPATIENT
Start: 2022-01-01 | End: 2022-01-01

## 2022-01-01 RX ORDER — POLYETHYLENE GLYCOL 3350 17 G/17G
17 POWDER, FOR SOLUTION ORAL
Refills: 0 | Status: DISCONTINUED | OUTPATIENT
Start: 2022-01-01 | End: 2022-01-01

## 2022-01-01 RX ORDER — CALCIUM CARBONATE 500(1250)
1 TABLET ORAL EVERY 6 HOURS
Refills: 0 | Status: COMPLETED | OUTPATIENT
Start: 2022-01-01 | End: 2022-01-01

## 2022-01-01 RX ORDER — HYDROMORPHONE HYDROCHLORIDE 2 MG/ML
0.5 INJECTION INTRAMUSCULAR; INTRAVENOUS; SUBCUTANEOUS EVERY 6 HOURS
Refills: 0 | Status: DISCONTINUED | OUTPATIENT
Start: 2022-01-01 | End: 2022-01-01

## 2022-01-01 RX ORDER — HYDROMORPHONE HYDROCHLORIDE 2 MG/ML
10 INJECTION INTRAMUSCULAR; INTRAVENOUS; SUBCUTANEOUS EVERY 4 HOURS
Refills: 0 | Status: DISCONTINUED | OUTPATIENT
Start: 2022-01-01 | End: 2022-01-01

## 2022-01-01 RX ORDER — OXYCODONE HYDROCHLORIDE AND ACETAMINOPHEN 5; 325 MG/1; MG/1
5-325 TABLET ORAL
Refills: 0 | Status: ACTIVE | COMMUNITY

## 2022-01-01 RX ORDER — PROCHLORPERAZINE MALEATE 5 MG
10 TABLET ORAL ONCE
Refills: 0 | Status: DISCONTINUED | OUTPATIENT
Start: 2022-01-01 | End: 2022-01-01

## 2022-01-01 RX ORDER — PANTOPRAZOLE SODIUM 20 MG/1
1 TABLET, DELAYED RELEASE ORAL
Qty: 0 | Refills: 0 | DISCHARGE
Start: 2022-01-01

## 2022-01-01 RX ORDER — HYDROMORPHONE HYDROCHLORIDE 2 MG/ML
2 INJECTION INTRAMUSCULAR; INTRAVENOUS; SUBCUTANEOUS ONCE
Refills: 0 | Status: DISCONTINUED | OUTPATIENT
Start: 2022-01-01 | End: 2022-01-01

## 2022-01-01 RX ORDER — SENNA PLUS 8.6 MG/1
2 TABLET ORAL
Qty: 0 | Refills: 0 | DISCHARGE
Start: 2022-01-01

## 2022-01-01 RX ORDER — HYDROMORPHONE HYDROCHLORIDE 2 MG/ML
0.5 INJECTION INTRAMUSCULAR; INTRAVENOUS; SUBCUTANEOUS
Refills: 0 | Status: DISCONTINUED | OUTPATIENT
Start: 2022-01-01 | End: 2022-01-01

## 2022-01-01 RX ORDER — LANOLIN ALCOHOL/MO/W.PET/CERES
3 CREAM (GRAM) TOPICAL AT BEDTIME
Refills: 0 | Status: DISCONTINUED | OUTPATIENT
Start: 2022-01-01 | End: 2022-01-01

## 2022-01-01 RX ORDER — LACTULOSE 10 G/15ML
20 SOLUTION ORAL EVERY 8 HOURS
Refills: 0 | Status: DISCONTINUED | OUTPATIENT
Start: 2022-01-01 | End: 2022-01-01

## 2022-01-01 RX ORDER — HYDROMORPHONE HYDROCHLORIDE 2 MG/ML
1 INJECTION INTRAMUSCULAR; INTRAVENOUS; SUBCUTANEOUS
Qty: 0 | Refills: 0 | DISCHARGE
Start: 2022-01-01

## 2022-01-01 RX ORDER — NALOXEGOL OXALATE 12.5 MG/1
25 TABLET, FILM COATED ORAL DAILY
Refills: 0 | Status: DISCONTINUED | OUTPATIENT
Start: 2022-01-01 | End: 2022-01-01

## 2022-01-01 RX ORDER — HYDROMORPHONE HYDROCHLORIDE 2 MG/ML
1 INJECTION INTRAMUSCULAR; INTRAVENOUS; SUBCUTANEOUS
Refills: 0 | Status: DISCONTINUED | OUTPATIENT
Start: 2022-01-01 | End: 2022-01-01

## 2022-01-01 RX ORDER — HYDROMORPHONE HYDROCHLORIDE 2 MG/ML
5 INJECTION INTRAMUSCULAR; INTRAVENOUS; SUBCUTANEOUS
Qty: 0 | Refills: 0 | DISCHARGE
Start: 2022-01-01

## 2022-01-01 RX ORDER — SOD SULF/SODIUM/NAHCO3/KCL/PEG
4000 SOLUTION, RECONSTITUTED, ORAL ORAL ONCE
Refills: 0 | Status: COMPLETED | OUTPATIENT
Start: 2022-01-01 | End: 2022-01-01

## 2022-01-01 RX ORDER — DEXAMETHASONE 0.5 MG/5ML
5 ELIXIR ORAL ONCE
Refills: 0 | Status: COMPLETED | OUTPATIENT
Start: 2022-01-01 | End: 2022-01-01

## 2022-01-01 RX ORDER — CEFAZOLIN SODIUM 1 G
2000 VIAL (EA) INJECTION EVERY 8 HOURS
Refills: 0 | Status: COMPLETED | OUTPATIENT
Start: 2022-01-01 | End: 2022-01-01

## 2022-01-01 RX ORDER — MULTIVIT WITH MIN/MFOLATE/K2 340-15/3 G
1 POWDER (GRAM) ORAL ONCE
Refills: 0 | Status: DISCONTINUED | OUTPATIENT
Start: 2022-01-01 | End: 2022-01-01

## 2022-01-01 RX ORDER — ENOXAPARIN SODIUM 100 MG/ML
40 INJECTION SUBCUTANEOUS
Qty: 0 | Refills: 0 | DISCHARGE
Start: 2022-01-01

## 2022-01-01 RX ORDER — DEXAMETHASONE 0.5 MG/5ML
4 ELIXIR ORAL EVERY 12 HOURS
Refills: 0 | Status: DISCONTINUED | OUTPATIENT
Start: 2022-01-01 | End: 2022-01-01

## 2022-01-01 RX ORDER — HYDROMORPHONE HYDROCHLORIDE 2 MG/ML
4 INJECTION INTRAMUSCULAR; INTRAVENOUS; SUBCUTANEOUS ONCE
Refills: 0 | Status: DISCONTINUED | OUTPATIENT
Start: 2022-01-01 | End: 2022-01-01

## 2022-01-01 RX ORDER — NALOXONE HYDROCHLORIDE 4 MG/.1ML
0.3 SPRAY NASAL
Qty: 0 | Refills: 0 | DISCHARGE
Start: 2022-01-01

## 2022-01-01 RX ADMIN — HYDROMORPHONE HYDROCHLORIDE 2 MILLIGRAM(S): 2 INJECTION INTRAMUSCULAR; INTRAVENOUS; SUBCUTANEOUS at 04:50

## 2022-01-01 RX ADMIN — HYDROMORPHONE HYDROCHLORIDE 4 MILLIGRAM(S): 2 INJECTION INTRAMUSCULAR; INTRAVENOUS; SUBCUTANEOUS at 13:33

## 2022-01-01 RX ADMIN — Medication 5 MILLIGRAM(S): at 21:25

## 2022-01-01 RX ADMIN — Medication 10 MILLIGRAM(S): at 16:05

## 2022-01-01 RX ADMIN — MAGNESIUM HYDROXIDE 30 MILLILITER(S): 400 TABLET, CHEWABLE ORAL at 05:43

## 2022-01-01 RX ADMIN — Medication 100 MILLIGRAM(S): at 05:37

## 2022-01-01 RX ADMIN — OXYCODONE HYDROCHLORIDE 10 MILLIGRAM(S): 5 TABLET ORAL at 06:20

## 2022-01-01 RX ADMIN — Medication 5 MILLIGRAM(S): at 07:52

## 2022-01-01 RX ADMIN — PANTOPRAZOLE SODIUM 40 MILLIGRAM(S): 20 TABLET, DELAYED RELEASE ORAL at 05:21

## 2022-01-01 RX ADMIN — Medication 1 TABLET(S): at 11:33

## 2022-01-01 RX ADMIN — MAGNESIUM HYDROXIDE 30 MILLILITER(S): 400 TABLET, CHEWABLE ORAL at 07:52

## 2022-01-01 RX ADMIN — HYDROMORPHONE HYDROCHLORIDE 0.5 MILLIGRAM(S): 2 INJECTION INTRAMUSCULAR; INTRAVENOUS; SUBCUTANEOUS at 15:50

## 2022-01-01 RX ADMIN — FAMOTIDINE 20 MILLIGRAM(S): 10 INJECTION INTRAVENOUS at 22:58

## 2022-01-01 RX ADMIN — FENTANYL CITRATE 1 PATCH: 50 INJECTION INTRAVENOUS at 17:40

## 2022-01-01 RX ADMIN — FENTANYL CITRATE 1 PATCH: 50 INJECTION INTRAVENOUS at 16:34

## 2022-01-01 RX ADMIN — HYDROMORPHONE HYDROCHLORIDE 2 MILLIGRAM(S): 2 INJECTION INTRAMUSCULAR; INTRAVENOUS; SUBCUTANEOUS at 23:00

## 2022-01-01 RX ADMIN — POLYETHYLENE GLYCOL 3350 17 GRAM(S): 17 POWDER, FOR SOLUTION ORAL at 17:23

## 2022-01-01 RX ADMIN — FENTANYL CITRATE 1 PATCH: 50 INJECTION INTRAVENOUS at 18:53

## 2022-01-01 RX ADMIN — HYDROMORPHONE HYDROCHLORIDE 1.5 MILLIGRAM(S): 2 INJECTION INTRAMUSCULAR; INTRAVENOUS; SUBCUTANEOUS at 15:07

## 2022-01-01 RX ADMIN — ONDANSETRON 4 MILLIGRAM(S): 8 TABLET, FILM COATED ORAL at 00:05

## 2022-01-01 RX ADMIN — SENNA PLUS 2 TABLET(S): 8.6 TABLET ORAL at 21:16

## 2022-01-01 RX ADMIN — Medication 25 MILLIGRAM(S): at 08:45

## 2022-01-01 RX ADMIN — FENTANYL CITRATE 1 PATCH: 50 INJECTION INTRAVENOUS at 19:56

## 2022-01-01 RX ADMIN — PANTOPRAZOLE SODIUM 40 MILLIGRAM(S): 20 TABLET, DELAYED RELEASE ORAL at 00:48

## 2022-01-01 RX ADMIN — HYDROMORPHONE HYDROCHLORIDE 2 MILLIGRAM(S): 2 INJECTION INTRAMUSCULAR; INTRAVENOUS; SUBCUTANEOUS at 09:30

## 2022-01-01 RX ADMIN — HYDROMORPHONE HYDROCHLORIDE 2 MILLIGRAM(S): 2 INJECTION INTRAMUSCULAR; INTRAVENOUS; SUBCUTANEOUS at 13:00

## 2022-01-01 RX ADMIN — HYDROMORPHONE HYDROCHLORIDE 2 MILLIGRAM(S): 2 INJECTION INTRAMUSCULAR; INTRAVENOUS; SUBCUTANEOUS at 12:30

## 2022-01-01 RX ADMIN — HYDROMORPHONE HYDROCHLORIDE 1.5 MILLIGRAM(S): 2 INJECTION INTRAMUSCULAR; INTRAVENOUS; SUBCUTANEOUS at 13:03

## 2022-01-01 RX ADMIN — HYDROMORPHONE HYDROCHLORIDE 30 MILLILITER(S): 2 INJECTION INTRAMUSCULAR; INTRAVENOUS; SUBCUTANEOUS at 19:14

## 2022-01-01 RX ADMIN — Medication 500 MILLIGRAM(S): at 05:21

## 2022-01-01 RX ADMIN — HYDROMORPHONE HYDROCHLORIDE 1 MILLIGRAM(S): 2 INJECTION INTRAMUSCULAR; INTRAVENOUS; SUBCUTANEOUS at 19:45

## 2022-01-01 RX ADMIN — FENTANYL CITRATE 1 PATCH: 50 INJECTION INTRAVENOUS at 07:00

## 2022-01-01 RX ADMIN — ONDANSETRON 4 MILLIGRAM(S): 8 TABLET, FILM COATED ORAL at 22:58

## 2022-01-01 RX ADMIN — FENTANYL CITRATE 1 PATCH: 50 INJECTION INTRAVENOUS at 07:15

## 2022-01-01 RX ADMIN — OXYCODONE HYDROCHLORIDE 10 MILLIGRAM(S): 5 TABLET ORAL at 02:36

## 2022-01-01 RX ADMIN — FENTANYL CITRATE 1 PATCH: 50 INJECTION INTRAVENOUS at 06:37

## 2022-01-01 RX ADMIN — FENTANYL CITRATE 1 PATCH: 50 INJECTION INTRAVENOUS at 06:43

## 2022-01-01 RX ADMIN — Medication 5 MILLIGRAM(S): at 21:16

## 2022-01-01 RX ADMIN — HYDROMORPHONE HYDROCHLORIDE 10 MILLIGRAM(S): 2 INJECTION INTRAMUSCULAR; INTRAVENOUS; SUBCUTANEOUS at 14:30

## 2022-01-01 RX ADMIN — Medication 1000 MILLIGRAM(S): at 08:52

## 2022-01-01 RX ADMIN — HYDROMORPHONE HYDROCHLORIDE 1.5 MILLIGRAM(S): 2 INJECTION INTRAMUSCULAR; INTRAVENOUS; SUBCUTANEOUS at 04:25

## 2022-01-01 RX ADMIN — Medication 10 MILLIGRAM(S): at 13:48

## 2022-01-01 RX ADMIN — MAGNESIUM HYDROXIDE 30 MILLILITER(S): 400 TABLET, CHEWABLE ORAL at 06:19

## 2022-01-01 RX ADMIN — HYDROMORPHONE HYDROCHLORIDE 1.5 MILLIGRAM(S): 2 INJECTION INTRAMUSCULAR; INTRAVENOUS; SUBCUTANEOUS at 16:55

## 2022-01-01 RX ADMIN — Medication 30 MILLILITER(S): at 00:21

## 2022-01-01 RX ADMIN — POLYETHYLENE GLYCOL 3350 17 GRAM(S): 17 POWDER, FOR SOLUTION ORAL at 18:31

## 2022-01-01 RX ADMIN — Medication 10 MILLIGRAM(S): at 05:09

## 2022-01-01 RX ADMIN — SODIUM CHLORIDE 60 MILLILITER(S): 9 INJECTION, SOLUTION INTRAVENOUS at 20:29

## 2022-01-01 RX ADMIN — Medication 1 TABLET(S): at 13:00

## 2022-01-01 RX ADMIN — HYDROMORPHONE HYDROCHLORIDE 1.5 MILLIGRAM(S): 2 INJECTION INTRAMUSCULAR; INTRAVENOUS; SUBCUTANEOUS at 10:00

## 2022-01-01 RX ADMIN — HYDROMORPHONE HYDROCHLORIDE 2.5 MILLIGRAM(S): 2 INJECTION INTRAMUSCULAR; INTRAVENOUS; SUBCUTANEOUS at 15:12

## 2022-01-01 RX ADMIN — FENTANYL CITRATE 1 PATCH: 50 INJECTION INTRAVENOUS at 19:00

## 2022-01-01 RX ADMIN — FENTANYL CITRATE 1 PATCH: 50 INJECTION INTRAVENOUS at 16:16

## 2022-01-01 RX ADMIN — NALOXEGOL OXALATE 25 MILLIGRAM(S): 12.5 TABLET, FILM COATED ORAL at 13:16

## 2022-01-01 RX ADMIN — HYDROMORPHONE HYDROCHLORIDE 0.5 MILLIGRAM(S): 2 INJECTION INTRAMUSCULAR; INTRAVENOUS; SUBCUTANEOUS at 18:20

## 2022-01-01 RX ADMIN — HYDROMORPHONE HYDROCHLORIDE 10 MILLIGRAM(S): 2 INJECTION INTRAMUSCULAR; INTRAVENOUS; SUBCUTANEOUS at 09:21

## 2022-01-01 RX ADMIN — Medication 102 MILLIGRAM(S): at 17:41

## 2022-01-01 RX ADMIN — HYDROMORPHONE HYDROCHLORIDE 10 MILLIGRAM(S): 2 INJECTION INTRAMUSCULAR; INTRAVENOUS; SUBCUTANEOUS at 13:09

## 2022-01-01 RX ADMIN — FENTANYL CITRATE 1 PATCH: 50 INJECTION INTRAVENOUS at 18:41

## 2022-01-01 RX ADMIN — HYDROMORPHONE HYDROCHLORIDE 2 MILLIGRAM(S): 2 INJECTION INTRAMUSCULAR; INTRAVENOUS; SUBCUTANEOUS at 10:37

## 2022-01-01 RX ADMIN — Medication 10 MILLIGRAM(S): at 21:15

## 2022-01-01 RX ADMIN — HYDROMORPHONE HYDROCHLORIDE 2 MILLIGRAM(S): 2 INJECTION INTRAMUSCULAR; INTRAVENOUS; SUBCUTANEOUS at 03:34

## 2022-01-01 RX ADMIN — HYDROMORPHONE HYDROCHLORIDE 10 MILLIGRAM(S): 2 INJECTION INTRAMUSCULAR; INTRAVENOUS; SUBCUTANEOUS at 22:06

## 2022-01-01 RX ADMIN — HYDROMORPHONE HYDROCHLORIDE 2 MILLIGRAM(S): 2 INJECTION INTRAMUSCULAR; INTRAVENOUS; SUBCUTANEOUS at 03:50

## 2022-01-01 RX ADMIN — FENTANYL CITRATE 1 PATCH: 50 INJECTION INTRAVENOUS at 10:47

## 2022-01-01 RX ADMIN — PANTOPRAZOLE SODIUM 40 MILLIGRAM(S): 20 TABLET, DELAYED RELEASE ORAL at 06:19

## 2022-01-01 RX ADMIN — Medication 4000 MILLILITER(S): at 12:55

## 2022-01-01 RX ADMIN — OXYCODONE HYDROCHLORIDE 5 MILLIGRAM(S): 5 TABLET ORAL at 01:26

## 2022-01-01 RX ADMIN — HYDROMORPHONE HYDROCHLORIDE 2 MILLIGRAM(S): 2 INJECTION INTRAMUSCULAR; INTRAVENOUS; SUBCUTANEOUS at 06:34

## 2022-01-01 RX ADMIN — FENTANYL CITRATE 1 PATCH: 50 INJECTION INTRAVENOUS at 19:31

## 2022-01-01 RX ADMIN — HYDROMORPHONE HYDROCHLORIDE 2 MILLIGRAM(S): 2 INJECTION INTRAMUSCULAR; INTRAVENOUS; SUBCUTANEOUS at 14:15

## 2022-01-01 RX ADMIN — HYDROMORPHONE HYDROCHLORIDE 1 MILLIGRAM(S): 2 INJECTION INTRAMUSCULAR; INTRAVENOUS; SUBCUTANEOUS at 09:14

## 2022-01-01 RX ADMIN — FAMOTIDINE 20 MILLIGRAM(S): 10 INJECTION INTRAVENOUS at 17:14

## 2022-01-01 RX ADMIN — HYDROMORPHONE HYDROCHLORIDE 2 MILLIGRAM(S): 2 INJECTION INTRAMUSCULAR; INTRAVENOUS; SUBCUTANEOUS at 19:00

## 2022-01-01 RX ADMIN — HYDROMORPHONE HYDROCHLORIDE 1 MILLIGRAM(S): 2 INJECTION INTRAMUSCULAR; INTRAVENOUS; SUBCUTANEOUS at 23:37

## 2022-01-01 RX ADMIN — Medication 10 MILLIGRAM(S): at 13:46

## 2022-01-01 RX ADMIN — HYDROMORPHONE HYDROCHLORIDE 1.5 MILLIGRAM(S): 2 INJECTION INTRAMUSCULAR; INTRAVENOUS; SUBCUTANEOUS at 23:45

## 2022-01-01 RX ADMIN — FAMOTIDINE 20 MILLIGRAM(S): 10 INJECTION INTRAVENOUS at 05:08

## 2022-01-01 RX ADMIN — Medication 1 TABLET(S): at 13:16

## 2022-01-01 RX ADMIN — POLYETHYLENE GLYCOL 3350 17 GRAM(S): 17 POWDER, FOR SOLUTION ORAL at 21:31

## 2022-01-01 RX ADMIN — HYDROMORPHONE HYDROCHLORIDE 2 MILLIGRAM(S): 2 INJECTION INTRAMUSCULAR; INTRAVENOUS; SUBCUTANEOUS at 06:03

## 2022-01-01 RX ADMIN — FENTANYL CITRATE 1 PATCH: 50 INJECTION INTRAVENOUS at 20:00

## 2022-01-01 RX ADMIN — ENOXAPARIN SODIUM 40 MILLIGRAM(S): 100 INJECTION SUBCUTANEOUS at 12:02

## 2022-01-01 RX ADMIN — NALOXEGOL OXALATE 25 MILLIGRAM(S): 12.5 TABLET, FILM COATED ORAL at 12:09

## 2022-01-01 RX ADMIN — HYDROMORPHONE HYDROCHLORIDE 2.5 MILLIGRAM(S): 2 INJECTION INTRAMUSCULAR; INTRAVENOUS; SUBCUTANEOUS at 11:10

## 2022-01-01 RX ADMIN — ENOXAPARIN SODIUM 40 MILLIGRAM(S): 100 INJECTION SUBCUTANEOUS at 12:10

## 2022-01-01 RX ADMIN — SENNA PLUS 2 TABLET(S): 8.6 TABLET ORAL at 21:25

## 2022-01-01 RX ADMIN — FENTANYL CITRATE 1 PATCH: 50 INJECTION INTRAVENOUS at 13:34

## 2022-01-01 RX ADMIN — HYDROMORPHONE HYDROCHLORIDE 10 MILLIGRAM(S): 2 INJECTION INTRAMUSCULAR; INTRAVENOUS; SUBCUTANEOUS at 06:52

## 2022-01-01 RX ADMIN — HYDROMORPHONE HYDROCHLORIDE 2 MILLIGRAM(S): 2 INJECTION INTRAMUSCULAR; INTRAVENOUS; SUBCUTANEOUS at 17:57

## 2022-01-01 RX ADMIN — Medication 1 TABLET(S): at 11:28

## 2022-01-01 RX ADMIN — Medication 400 MILLIGRAM(S): at 14:46

## 2022-01-01 RX ADMIN — HYDROMORPHONE HYDROCHLORIDE 1.5 MILLIGRAM(S): 2 INJECTION INTRAMUSCULAR; INTRAVENOUS; SUBCUTANEOUS at 09:40

## 2022-01-01 RX ADMIN — HYDROMORPHONE HYDROCHLORIDE 10 MILLIGRAM(S): 2 INJECTION INTRAMUSCULAR; INTRAVENOUS; SUBCUTANEOUS at 18:50

## 2022-01-01 RX ADMIN — HYDROMORPHONE HYDROCHLORIDE 1 MILLIGRAM(S): 2 INJECTION INTRAMUSCULAR; INTRAVENOUS; SUBCUTANEOUS at 09:45

## 2022-01-01 RX ADMIN — ONDANSETRON 4 MILLIGRAM(S): 8 TABLET, FILM COATED ORAL at 15:50

## 2022-01-01 RX ADMIN — SENNA PLUS 2 TABLET(S): 8.6 TABLET ORAL at 05:21

## 2022-01-01 RX ADMIN — PANTOPRAZOLE SODIUM 40 MILLIGRAM(S): 20 TABLET, DELAYED RELEASE ORAL at 05:42

## 2022-01-01 RX ADMIN — ENOXAPARIN SODIUM 40 MILLIGRAM(S): 100 INJECTION SUBCUTANEOUS at 11:36

## 2022-01-01 RX ADMIN — HYDROMORPHONE HYDROCHLORIDE 10 MILLIGRAM(S): 2 INJECTION INTRAMUSCULAR; INTRAVENOUS; SUBCUTANEOUS at 19:48

## 2022-01-01 RX ADMIN — SENNA PLUS 2 TABLET(S): 8.6 TABLET ORAL at 05:42

## 2022-01-01 RX ADMIN — HYDROMORPHONE HYDROCHLORIDE 0.5 MILLIGRAM(S): 2 INJECTION INTRAMUSCULAR; INTRAVENOUS; SUBCUTANEOUS at 06:04

## 2022-01-01 RX ADMIN — HYDROMORPHONE HYDROCHLORIDE 4 MILLIGRAM(S): 2 INJECTION INTRAMUSCULAR; INTRAVENOUS; SUBCUTANEOUS at 14:45

## 2022-01-01 RX ADMIN — HYDROMORPHONE HYDROCHLORIDE 2 MILLIGRAM(S): 2 INJECTION INTRAMUSCULAR; INTRAVENOUS; SUBCUTANEOUS at 21:07

## 2022-01-01 RX ADMIN — OXYCODONE HYDROCHLORIDE 10 MILLIGRAM(S): 5 TABLET ORAL at 18:00

## 2022-01-01 RX ADMIN — HYDROMORPHONE HYDROCHLORIDE 1.5 MILLIGRAM(S): 2 INJECTION INTRAMUSCULAR; INTRAVENOUS; SUBCUTANEOUS at 18:10

## 2022-01-01 RX ADMIN — NALOXEGOL OXALATE 25 MILLIGRAM(S): 12.5 TABLET, FILM COATED ORAL at 11:52

## 2022-01-01 RX ADMIN — ONDANSETRON 4 MILLIGRAM(S): 8 TABLET, FILM COATED ORAL at 17:03

## 2022-01-01 RX ADMIN — HYDROMORPHONE HYDROCHLORIDE 1 MILLIGRAM(S): 2 INJECTION INTRAMUSCULAR; INTRAVENOUS; SUBCUTANEOUS at 15:53

## 2022-01-01 RX ADMIN — ENOXAPARIN SODIUM 40 MILLIGRAM(S): 100 INJECTION SUBCUTANEOUS at 13:08

## 2022-01-01 RX ADMIN — Medication 975 MILLIGRAM(S): at 02:07

## 2022-01-01 RX ADMIN — PANTOPRAZOLE SODIUM 40 MILLIGRAM(S): 20 TABLET, DELAYED RELEASE ORAL at 11:48

## 2022-01-01 RX ADMIN — SENNA PLUS 2 TABLET(S): 8.6 TABLET ORAL at 17:22

## 2022-01-01 RX ADMIN — SENNA PLUS 2 TABLET(S): 8.6 TABLET ORAL at 21:31

## 2022-01-01 RX ADMIN — Medication 1 TABLET(S): at 12:34

## 2022-01-01 RX ADMIN — Medication 975 MILLIGRAM(S): at 23:28

## 2022-01-01 RX ADMIN — POLYETHYLENE GLYCOL 3350 17 GRAM(S): 17 POWDER, FOR SOLUTION ORAL at 05:33

## 2022-01-01 RX ADMIN — Medication 10 MILLIGRAM(S): at 13:05

## 2022-01-01 RX ADMIN — HYDROMORPHONE HYDROCHLORIDE 10 MILLIGRAM(S): 2 INJECTION INTRAMUSCULAR; INTRAVENOUS; SUBCUTANEOUS at 15:30

## 2022-01-01 RX ADMIN — OXYCODONE HYDROCHLORIDE 10 MILLIGRAM(S): 5 TABLET ORAL at 11:31

## 2022-01-01 RX ADMIN — Medication 975 MILLIGRAM(S): at 22:12

## 2022-01-01 RX ADMIN — HYDROMORPHONE HYDROCHLORIDE 1 MILLIGRAM(S): 2 INJECTION INTRAMUSCULAR; INTRAVENOUS; SUBCUTANEOUS at 07:15

## 2022-01-01 RX ADMIN — ENOXAPARIN SODIUM 40 MILLIGRAM(S): 100 INJECTION SUBCUTANEOUS at 11:53

## 2022-01-01 RX ADMIN — HYDROMORPHONE HYDROCHLORIDE 1.5 MILLIGRAM(S): 2 INJECTION INTRAMUSCULAR; INTRAVENOUS; SUBCUTANEOUS at 04:05

## 2022-01-01 RX ADMIN — Medication 1 TABLET(S): at 11:53

## 2022-01-01 RX ADMIN — HYDROMORPHONE HYDROCHLORIDE 2 MILLIGRAM(S): 2 INJECTION INTRAMUSCULAR; INTRAVENOUS; SUBCUTANEOUS at 09:32

## 2022-01-01 RX ADMIN — PANTOPRAZOLE SODIUM 40 MILLIGRAM(S): 20 TABLET, DELAYED RELEASE ORAL at 10:14

## 2022-01-01 RX ADMIN — HYDROMORPHONE HYDROCHLORIDE 2 MILLIGRAM(S): 2 INJECTION INTRAMUSCULAR; INTRAVENOUS; SUBCUTANEOUS at 12:32

## 2022-01-01 RX ADMIN — HYDROMORPHONE HYDROCHLORIDE 2 MILLIGRAM(S): 2 INJECTION INTRAMUSCULAR; INTRAVENOUS; SUBCUTANEOUS at 09:06

## 2022-01-01 RX ADMIN — HYDROMORPHONE HYDROCHLORIDE 2 MILLIGRAM(S): 2 INJECTION INTRAMUSCULAR; INTRAVENOUS; SUBCUTANEOUS at 15:00

## 2022-01-01 RX ADMIN — Medication 4 MILLIGRAM(S): at 05:18

## 2022-01-01 RX ADMIN — HYDROMORPHONE HYDROCHLORIDE 2 MILLIGRAM(S): 2 INJECTION INTRAMUSCULAR; INTRAVENOUS; SUBCUTANEOUS at 07:31

## 2022-01-01 RX ADMIN — NALOXEGOL OXALATE 25 MILLIGRAM(S): 12.5 TABLET, FILM COATED ORAL at 11:36

## 2022-01-01 RX ADMIN — HYDROMORPHONE HYDROCHLORIDE 10 MILLIGRAM(S): 2 INJECTION INTRAMUSCULAR; INTRAVENOUS; SUBCUTANEOUS at 17:51

## 2022-01-01 RX ADMIN — HYDROMORPHONE HYDROCHLORIDE 10 MILLIGRAM(S): 2 INJECTION INTRAMUSCULAR; INTRAVENOUS; SUBCUTANEOUS at 05:52

## 2022-01-01 RX ADMIN — Medication 975 MILLIGRAM(S): at 21:21

## 2022-01-01 RX ADMIN — FENTANYL CITRATE 1 PATCH: 50 INJECTION INTRAVENOUS at 12:45

## 2022-01-01 RX ADMIN — HYDROMORPHONE HYDROCHLORIDE 2 MILLIGRAM(S): 2 INJECTION INTRAMUSCULAR; INTRAVENOUS; SUBCUTANEOUS at 17:23

## 2022-01-01 RX ADMIN — HYDROMORPHONE HYDROCHLORIDE 2 MILLIGRAM(S): 2 INJECTION INTRAMUSCULAR; INTRAVENOUS; SUBCUTANEOUS at 13:43

## 2022-01-01 RX ADMIN — ENOXAPARIN SODIUM 40 MILLIGRAM(S): 100 INJECTION SUBCUTANEOUS at 11:28

## 2022-01-01 RX ADMIN — HYDROMORPHONE HYDROCHLORIDE 1 MILLIGRAM(S): 2 INJECTION INTRAMUSCULAR; INTRAVENOUS; SUBCUTANEOUS at 14:50

## 2022-01-01 RX ADMIN — HYDROMORPHONE HYDROCHLORIDE 1.5 MILLIGRAM(S): 2 INJECTION INTRAMUSCULAR; INTRAVENOUS; SUBCUTANEOUS at 16:24

## 2022-01-01 RX ADMIN — HYDROMORPHONE HYDROCHLORIDE 2 MILLIGRAM(S): 2 INJECTION INTRAMUSCULAR; INTRAVENOUS; SUBCUTANEOUS at 08:44

## 2022-01-01 RX ADMIN — ONDANSETRON 4 MILLIGRAM(S): 8 TABLET, FILM COATED ORAL at 06:18

## 2022-01-01 RX ADMIN — ENOXAPARIN SODIUM 40 MILLIGRAM(S): 100 INJECTION SUBCUTANEOUS at 12:46

## 2022-01-01 RX ADMIN — ONDANSETRON 4 MILLIGRAM(S): 8 TABLET, FILM COATED ORAL at 13:29

## 2022-01-01 RX ADMIN — FAMOTIDINE 20 MILLIGRAM(S): 10 INJECTION INTRAVENOUS at 18:32

## 2022-01-01 RX ADMIN — Medication 400 MILLIGRAM(S): at 05:33

## 2022-01-01 RX ADMIN — NALOXEGOL OXALATE 25 MILLIGRAM(S): 12.5 TABLET, FILM COATED ORAL at 06:46

## 2022-01-01 RX ADMIN — NALOXEGOL OXALATE 25 MILLIGRAM(S): 12.5 TABLET, FILM COATED ORAL at 15:03

## 2022-01-01 RX ADMIN — ONDANSETRON 4 MILLIGRAM(S): 8 TABLET, FILM COATED ORAL at 17:36

## 2022-01-01 RX ADMIN — FENTANYL CITRATE 1 PATCH: 50 INJECTION INTRAVENOUS at 17:14

## 2022-01-01 RX ADMIN — HYDROMORPHONE HYDROCHLORIDE 10 MILLIGRAM(S): 2 INJECTION INTRAMUSCULAR; INTRAVENOUS; SUBCUTANEOUS at 11:27

## 2022-01-01 RX ADMIN — FENTANYL CITRATE 1 PATCH: 50 INJECTION INTRAVENOUS at 06:48

## 2022-01-01 RX ADMIN — ONDANSETRON 4 MILLIGRAM(S): 8 TABLET, FILM COATED ORAL at 00:41

## 2022-01-01 RX ADMIN — NALOXEGOL OXALATE 25 MILLIGRAM(S): 12.5 TABLET, FILM COATED ORAL at 13:45

## 2022-01-01 RX ADMIN — HYDROMORPHONE HYDROCHLORIDE 1 MILLIGRAM(S): 2 INJECTION INTRAMUSCULAR; INTRAVENOUS; SUBCUTANEOUS at 16:30

## 2022-01-01 RX ADMIN — ONDANSETRON 4 MILLIGRAM(S): 8 TABLET, FILM COATED ORAL at 04:20

## 2022-01-01 RX ADMIN — Medication 1000 MILLIGRAM(S): at 00:56

## 2022-01-01 RX ADMIN — ENOXAPARIN SODIUM 40 MILLIGRAM(S): 100 INJECTION SUBCUTANEOUS at 13:16

## 2022-01-01 RX ADMIN — SODIUM CHLORIDE 500 MILLILITER(S): 9 INJECTION, SOLUTION INTRAVENOUS at 21:54

## 2022-01-01 RX ADMIN — HYDROMORPHONE HYDROCHLORIDE 2 MILLIGRAM(S): 2 INJECTION INTRAMUSCULAR; INTRAVENOUS; SUBCUTANEOUS at 06:18

## 2022-01-01 RX ADMIN — SENNA PLUS 2 TABLET(S): 8.6 TABLET ORAL at 22:59

## 2022-01-01 RX ADMIN — SODIUM CHLORIDE 75 MILLILITER(S): 9 INJECTION INTRAMUSCULAR; INTRAVENOUS; SUBCUTANEOUS at 01:02

## 2022-01-01 RX ADMIN — HYDROMORPHONE HYDROCHLORIDE 2 MILLIGRAM(S): 2 INJECTION INTRAMUSCULAR; INTRAVENOUS; SUBCUTANEOUS at 22:31

## 2022-01-01 RX ADMIN — HYDROMORPHONE HYDROCHLORIDE 0.5 MILLIGRAM(S): 2 INJECTION INTRAMUSCULAR; INTRAVENOUS; SUBCUTANEOUS at 16:11

## 2022-01-01 RX ADMIN — OXYCODONE HYDROCHLORIDE 5 MILLIGRAM(S): 5 TABLET ORAL at 00:56

## 2022-01-01 RX ADMIN — HYDROMORPHONE HYDROCHLORIDE 1.5 MILLIGRAM(S): 2 INJECTION INTRAMUSCULAR; INTRAVENOUS; SUBCUTANEOUS at 19:08

## 2022-01-01 RX ADMIN — SODIUM CHLORIDE 500 MILLILITER(S): 9 INJECTION, SOLUTION INTRAVENOUS at 05:38

## 2022-01-01 RX ADMIN — Medication 1 ENEMA: at 23:15

## 2022-01-01 RX ADMIN — Medication 500 MILLIGRAM(S): at 17:22

## 2022-01-01 RX ADMIN — HYDROMORPHONE HYDROCHLORIDE 2 MILLIGRAM(S): 2 INJECTION INTRAMUSCULAR; INTRAVENOUS; SUBCUTANEOUS at 12:47

## 2022-01-01 RX ADMIN — Medication 975 MILLIGRAM(S): at 10:13

## 2022-01-01 RX ADMIN — HYDROMORPHONE HYDROCHLORIDE 0.5 MILLIGRAM(S): 2 INJECTION INTRAMUSCULAR; INTRAVENOUS; SUBCUTANEOUS at 18:30

## 2022-01-01 RX ADMIN — Medication 30 MILLILITER(S): at 06:15

## 2022-01-01 RX ADMIN — HYDROMORPHONE HYDROCHLORIDE 2 MILLIGRAM(S): 2 INJECTION INTRAMUSCULAR; INTRAVENOUS; SUBCUTANEOUS at 13:28

## 2022-01-01 RX ADMIN — ONDANSETRON 4 MILLIGRAM(S): 8 TABLET, FILM COATED ORAL at 06:08

## 2022-01-01 RX ADMIN — Medication 975 MILLIGRAM(S): at 06:00

## 2022-01-01 RX ADMIN — OXYCODONE HYDROCHLORIDE 10 MILLIGRAM(S): 5 TABLET ORAL at 03:37

## 2022-01-01 RX ADMIN — Medication 975 MILLIGRAM(S): at 17:00

## 2022-01-01 RX ADMIN — Medication 1000 MILLIGRAM(S): at 11:12

## 2022-01-01 RX ADMIN — FENTANYL CITRATE 1 PATCH: 50 INJECTION INTRAVENOUS at 19:51

## 2022-01-01 RX ADMIN — Medication 5 MILLIGRAM(S): at 08:45

## 2022-01-01 RX ADMIN — Medication 975 MILLIGRAM(S): at 11:14

## 2022-01-01 RX ADMIN — HYDROMORPHONE HYDROCHLORIDE 2 MILLIGRAM(S): 2 INJECTION INTRAMUSCULAR; INTRAVENOUS; SUBCUTANEOUS at 20:20

## 2022-01-01 RX ADMIN — HYDROMORPHONE HYDROCHLORIDE 2 MILLIGRAM(S): 2 INJECTION INTRAMUSCULAR; INTRAVENOUS; SUBCUTANEOUS at 16:52

## 2022-01-01 RX ADMIN — HYDROMORPHONE HYDROCHLORIDE 2 MILLIGRAM(S): 2 INJECTION INTRAMUSCULAR; INTRAVENOUS; SUBCUTANEOUS at 14:26

## 2022-01-01 RX ADMIN — FENTANYL CITRATE 1 PATCH: 50 INJECTION INTRAVENOUS at 08:16

## 2022-01-01 RX ADMIN — HYDROMORPHONE HYDROCHLORIDE 2 MILLIGRAM(S): 2 INJECTION INTRAMUSCULAR; INTRAVENOUS; SUBCUTANEOUS at 10:15

## 2022-01-01 RX ADMIN — MORPHINE SULFATE 15 MILLIGRAM(S): 50 CAPSULE, EXTENDED RELEASE ORAL at 10:15

## 2022-01-01 RX ADMIN — HYDROMORPHONE HYDROCHLORIDE 1.5 MILLIGRAM(S): 2 INJECTION INTRAMUSCULAR; INTRAVENOUS; SUBCUTANEOUS at 21:35

## 2022-01-01 RX ADMIN — OXYCODONE HYDROCHLORIDE 10 MILLIGRAM(S): 5 TABLET ORAL at 15:14

## 2022-01-01 RX ADMIN — HYDROMORPHONE HYDROCHLORIDE 2 MILLIGRAM(S): 2 INJECTION INTRAMUSCULAR; INTRAVENOUS; SUBCUTANEOUS at 06:50

## 2022-01-01 RX ADMIN — HYDROMORPHONE HYDROCHLORIDE 1 MILLIGRAM(S): 2 INJECTION INTRAMUSCULAR; INTRAVENOUS; SUBCUTANEOUS at 03:09

## 2022-01-01 RX ADMIN — Medication 400 MILLIGRAM(S): at 23:09

## 2022-01-01 RX ADMIN — HYDROMORPHONE HYDROCHLORIDE 1 MILLIGRAM(S): 2 INJECTION INTRAMUSCULAR; INTRAVENOUS; SUBCUTANEOUS at 15:33

## 2022-01-01 RX ADMIN — FAMOTIDINE 20 MILLIGRAM(S): 10 INJECTION INTRAVENOUS at 05:15

## 2022-01-01 RX ADMIN — HYDROMORPHONE HYDROCHLORIDE 0.5 MILLIGRAM(S): 2 INJECTION INTRAMUSCULAR; INTRAVENOUS; SUBCUTANEOUS at 06:34

## 2022-01-01 RX ADMIN — Medication 1 TABLET(S): at 18:07

## 2022-01-01 RX ADMIN — HYDROMORPHONE HYDROCHLORIDE 30 MILLILITER(S): 2 INJECTION INTRAMUSCULAR; INTRAVENOUS; SUBCUTANEOUS at 07:12

## 2022-01-01 RX ADMIN — Medication 500 MILLIGRAM(S): at 05:52

## 2022-01-01 RX ADMIN — HYDROMORPHONE HYDROCHLORIDE 2 MILLIGRAM(S): 2 INJECTION INTRAMUSCULAR; INTRAVENOUS; SUBCUTANEOUS at 17:15

## 2022-01-01 RX ADMIN — HYDROMORPHONE HYDROCHLORIDE 10 MILLIGRAM(S): 2 INJECTION INTRAMUSCULAR; INTRAVENOUS; SUBCUTANEOUS at 10:21

## 2022-01-01 RX ADMIN — Medication 1 TABLET(S): at 12:03

## 2022-01-01 RX ADMIN — SENNA PLUS 2 TABLET(S): 8.6 TABLET ORAL at 12:33

## 2022-01-01 RX ADMIN — SODIUM CHLORIDE 100 MILLILITER(S): 9 INJECTION INTRAMUSCULAR; INTRAVENOUS; SUBCUTANEOUS at 21:21

## 2022-01-01 RX ADMIN — HYDROMORPHONE HYDROCHLORIDE 2 MILLIGRAM(S): 2 INJECTION INTRAMUSCULAR; INTRAVENOUS; SUBCUTANEOUS at 19:15

## 2022-01-01 RX ADMIN — OXYCODONE HYDROCHLORIDE 10 MILLIGRAM(S): 5 TABLET ORAL at 06:50

## 2022-01-01 RX ADMIN — PANTOPRAZOLE SODIUM 40 MILLIGRAM(S): 20 TABLET, DELAYED RELEASE ORAL at 10:15

## 2022-01-01 RX ADMIN — HYDROMORPHONE HYDROCHLORIDE 10 MILLIGRAM(S): 2 INJECTION INTRAMUSCULAR; INTRAVENOUS; SUBCUTANEOUS at 05:22

## 2022-01-01 RX ADMIN — HYDROMORPHONE HYDROCHLORIDE 1.5 MILLIGRAM(S): 2 INJECTION INTRAMUSCULAR; INTRAVENOUS; SUBCUTANEOUS at 16:30

## 2022-01-01 RX ADMIN — OXYCODONE HYDROCHLORIDE 10 MILLIGRAM(S): 5 TABLET ORAL at 11:33

## 2022-01-01 RX ADMIN — Medication 1 TABLET(S): at 11:36

## 2022-01-01 RX ADMIN — SENNA PLUS 2 TABLET(S): 8.6 TABLET ORAL at 21:21

## 2022-01-01 RX ADMIN — HYDROMORPHONE HYDROCHLORIDE 2 MILLIGRAM(S): 2 INJECTION INTRAMUSCULAR; INTRAVENOUS; SUBCUTANEOUS at 04:20

## 2022-01-01 RX ADMIN — HYDROMORPHONE HYDROCHLORIDE 1.5 MILLIGRAM(S): 2 INJECTION INTRAMUSCULAR; INTRAVENOUS; SUBCUTANEOUS at 10:14

## 2022-01-01 RX ADMIN — Medication 400 MILLIGRAM(S): at 08:35

## 2022-01-01 RX ADMIN — SENNA PLUS 2 TABLET(S): 8.6 TABLET ORAL at 05:37

## 2022-01-01 RX ADMIN — SENNA PLUS 2 TABLET(S): 8.6 TABLET ORAL at 21:30

## 2022-01-01 RX ADMIN — Medication 500 MILLIGRAM(S): at 17:21

## 2022-01-01 RX ADMIN — Medication 10 MILLIGRAM(S): at 21:25

## 2022-01-01 RX ADMIN — Medication 975 MILLIGRAM(S): at 22:58

## 2022-01-01 RX ADMIN — Medication 975 MILLIGRAM(S): at 11:13

## 2022-01-01 RX ADMIN — PANTOPRAZOLE SODIUM 40 MILLIGRAM(S): 20 TABLET, DELAYED RELEASE ORAL at 06:04

## 2022-01-01 RX ADMIN — HYDROMORPHONE HYDROCHLORIDE 2 MILLIGRAM(S): 2 INJECTION INTRAMUSCULAR; INTRAVENOUS; SUBCUTANEOUS at 20:07

## 2022-01-01 RX ADMIN — HYDROMORPHONE HYDROCHLORIDE 1.5 MILLIGRAM(S): 2 INJECTION INTRAMUSCULAR; INTRAVENOUS; SUBCUTANEOUS at 14:52

## 2022-01-01 RX ADMIN — HYDROMORPHONE HYDROCHLORIDE 1.5 MILLIGRAM(S): 2 INJECTION INTRAMUSCULAR; INTRAVENOUS; SUBCUTANEOUS at 06:25

## 2022-01-01 RX ADMIN — Medication 10 MILLIGRAM(S): at 05:17

## 2022-01-01 RX ADMIN — NALOXEGOL OXALATE 25 MILLIGRAM(S): 12.5 TABLET, FILM COATED ORAL at 13:17

## 2022-01-01 RX ADMIN — FENTANYL CITRATE 1 PATCH: 50 INJECTION INTRAVENOUS at 06:13

## 2022-01-01 RX ADMIN — Medication 10 MILLIGRAM(S): at 06:46

## 2022-01-01 RX ADMIN — HYDROMORPHONE HYDROCHLORIDE 1 MILLIGRAM(S): 2 INJECTION INTRAMUSCULAR; INTRAVENOUS; SUBCUTANEOUS at 08:00

## 2022-01-01 RX ADMIN — FENTANYL CITRATE 1 PATCH: 50 INJECTION INTRAVENOUS at 18:14

## 2022-01-01 RX ADMIN — FAMOTIDINE 20 MILLIGRAM(S): 10 INJECTION INTRAVENOUS at 08:45

## 2022-01-01 RX ADMIN — HYDROMORPHONE HYDROCHLORIDE 2 MILLIGRAM(S): 2 INJECTION INTRAMUSCULAR; INTRAVENOUS; SUBCUTANEOUS at 17:30

## 2022-01-01 RX ADMIN — FENTANYL CITRATE 1 PATCH: 50 INJECTION INTRAVENOUS at 19:54

## 2022-01-01 RX ADMIN — PANTOPRAZOLE SODIUM 40 MILLIGRAM(S): 20 TABLET, DELAYED RELEASE ORAL at 05:33

## 2022-01-01 RX ADMIN — HYDROMORPHONE HYDROCHLORIDE 0.5 MILLIGRAM(S): 2 INJECTION INTRAMUSCULAR; INTRAVENOUS; SUBCUTANEOUS at 15:51

## 2022-01-01 RX ADMIN — HYDROMORPHONE HYDROCHLORIDE 1 MILLIGRAM(S): 2 INJECTION INTRAMUSCULAR; INTRAVENOUS; SUBCUTANEOUS at 20:15

## 2022-01-01 RX ADMIN — HYDROMORPHONE HYDROCHLORIDE 1.5 MILLIGRAM(S): 2 INJECTION INTRAMUSCULAR; INTRAVENOUS; SUBCUTANEOUS at 21:50

## 2022-01-01 RX ADMIN — SODIUM CHLORIDE 500 MILLILITER(S): 9 INJECTION, SOLUTION INTRAVENOUS at 18:07

## 2022-01-01 RX ADMIN — SENNA PLUS 2 TABLET(S): 8.6 TABLET ORAL at 06:19

## 2022-01-01 RX ADMIN — Medication 1000 MILLIGRAM(S): at 06:39

## 2022-01-01 RX ADMIN — ONDANSETRON 4 MILLIGRAM(S): 8 TABLET, FILM COATED ORAL at 06:17

## 2022-01-01 RX ADMIN — ONDANSETRON 4 MILLIGRAM(S): 8 TABLET, FILM COATED ORAL at 14:15

## 2022-01-01 RX ADMIN — FENTANYL CITRATE 1 PATCH: 50 INJECTION INTRAVENOUS at 17:38

## 2022-01-01 RX ADMIN — HYDROMORPHONE HYDROCHLORIDE 2.5 MILLIGRAM(S): 2 INJECTION INTRAMUSCULAR; INTRAVENOUS; SUBCUTANEOUS at 11:25

## 2022-01-01 RX ADMIN — HYDROMORPHONE HYDROCHLORIDE 1.5 MILLIGRAM(S): 2 INJECTION INTRAMUSCULAR; INTRAVENOUS; SUBCUTANEOUS at 13:30

## 2022-01-01 RX ADMIN — FENTANYL CITRATE 1 PATCH: 50 INJECTION INTRAVENOUS at 18:23

## 2022-01-01 RX ADMIN — HYDROMORPHONE HYDROCHLORIDE 10 MILLIGRAM(S): 2 INJECTION INTRAMUSCULAR; INTRAVENOUS; SUBCUTANEOUS at 23:00

## 2022-01-01 RX ADMIN — FENTANYL CITRATE 1 PATCH: 50 INJECTION INTRAVENOUS at 07:45

## 2022-01-01 RX ADMIN — OXYCODONE HYDROCHLORIDE 10 MILLIGRAM(S): 5 TABLET ORAL at 03:36

## 2022-01-01 RX ADMIN — ENOXAPARIN SODIUM 40 MILLIGRAM(S): 100 INJECTION SUBCUTANEOUS at 11:17

## 2022-01-01 RX ADMIN — HYDROMORPHONE HYDROCHLORIDE 1 MILLIGRAM(S): 2 INJECTION INTRAMUSCULAR; INTRAVENOUS; SUBCUTANEOUS at 03:39

## 2022-01-01 RX ADMIN — Medication 1000 MILLIGRAM(S): at 22:26

## 2022-01-01 RX ADMIN — HYDROMORPHONE HYDROCHLORIDE 1.5 MILLIGRAM(S): 2 INJECTION INTRAMUSCULAR; INTRAVENOUS; SUBCUTANEOUS at 16:06

## 2022-01-01 RX ADMIN — HYDROMORPHONE HYDROCHLORIDE 1.5 MILLIGRAM(S): 2 INJECTION INTRAMUSCULAR; INTRAVENOUS; SUBCUTANEOUS at 10:15

## 2022-01-01 RX ADMIN — PANTOPRAZOLE SODIUM 40 MILLIGRAM(S): 20 TABLET, DELAYED RELEASE ORAL at 05:37

## 2022-01-01 RX ADMIN — OXYCODONE HYDROCHLORIDE 10 MILLIGRAM(S): 5 TABLET ORAL at 02:50

## 2022-01-01 RX ADMIN — HYDROMORPHONE HYDROCHLORIDE 2 MILLIGRAM(S): 2 INJECTION INTRAMUSCULAR; INTRAVENOUS; SUBCUTANEOUS at 03:10

## 2022-01-01 RX ADMIN — OXYCODONE HYDROCHLORIDE 5 MILLIGRAM(S): 5 TABLET ORAL at 19:35

## 2022-01-01 RX ADMIN — HYDROMORPHONE HYDROCHLORIDE 2 MILLIGRAM(S): 2 INJECTION INTRAMUSCULAR; INTRAVENOUS; SUBCUTANEOUS at 11:35

## 2022-01-01 RX ADMIN — HYDROMORPHONE HYDROCHLORIDE 1.5 MILLIGRAM(S): 2 INJECTION INTRAMUSCULAR; INTRAVENOUS; SUBCUTANEOUS at 23:31

## 2022-01-01 RX ADMIN — OXYCODONE HYDROCHLORIDE 5 MILLIGRAM(S): 5 TABLET ORAL at 18:35

## 2022-01-01 RX ADMIN — Medication 400 MILLIGRAM(S): at 21:29

## 2022-01-01 RX ADMIN — Medication 975 MILLIGRAM(S): at 10:14

## 2022-01-01 RX ADMIN — HYDROMORPHONE HYDROCHLORIDE 10 MILLIGRAM(S): 2 INJECTION INTRAMUSCULAR; INTRAVENOUS; SUBCUTANEOUS at 13:43

## 2022-01-01 RX ADMIN — HYDROMORPHONE HYDROCHLORIDE 0.5 MILLIGRAM(S): 2 INJECTION INTRAMUSCULAR; INTRAVENOUS; SUBCUTANEOUS at 18:07

## 2022-01-01 RX ADMIN — HYDROMORPHONE HYDROCHLORIDE 2 MILLIGRAM(S): 2 INJECTION INTRAMUSCULAR; INTRAVENOUS; SUBCUTANEOUS at 09:07

## 2022-01-01 RX ADMIN — Medication 975 MILLIGRAM(S): at 06:34

## 2022-01-01 RX ADMIN — ONDANSETRON 4 MILLIGRAM(S): 8 TABLET, FILM COATED ORAL at 17:55

## 2022-01-01 RX ADMIN — HYDROMORPHONE HYDROCHLORIDE 2 MILLIGRAM(S): 2 INJECTION INTRAMUSCULAR; INTRAVENOUS; SUBCUTANEOUS at 20:37

## 2022-01-01 RX ADMIN — POLYETHYLENE GLYCOL 3350 17 GRAM(S): 17 POWDER, FOR SOLUTION ORAL at 06:19

## 2022-01-01 RX ADMIN — Medication 400 MILLIGRAM(S): at 10:50

## 2022-01-01 RX ADMIN — HYDROMORPHONE HYDROCHLORIDE 1.5 MILLIGRAM(S): 2 INJECTION INTRAMUSCULAR; INTRAVENOUS; SUBCUTANEOUS at 06:04

## 2022-01-01 RX ADMIN — ONDANSETRON 4 MILLIGRAM(S): 8 TABLET, FILM COATED ORAL at 05:55

## 2022-01-01 RX ADMIN — ONDANSETRON 4 MILLIGRAM(S): 8 TABLET, FILM COATED ORAL at 12:50

## 2022-01-01 RX ADMIN — NALOXEGOL OXALATE 25 MILLIGRAM(S): 12.5 TABLET, FILM COATED ORAL at 06:07

## 2022-01-01 RX ADMIN — HYDROMORPHONE HYDROCHLORIDE 10 MILLIGRAM(S): 2 INJECTION INTRAMUSCULAR; INTRAVENOUS; SUBCUTANEOUS at 14:43

## 2022-01-01 RX ADMIN — SODIUM CHLORIDE 100 MILLILITER(S): 9 INJECTION, SOLUTION INTRAVENOUS at 02:08

## 2022-01-01 RX ADMIN — SENNA PLUS 2 TABLET(S): 8.6 TABLET ORAL at 18:07

## 2022-01-01 RX ADMIN — OXYCODONE HYDROCHLORIDE 10 MILLIGRAM(S): 5 TABLET ORAL at 12:07

## 2022-01-01 RX ADMIN — SENNA PLUS 2 TABLET(S): 8.6 TABLET ORAL at 17:18

## 2022-01-01 RX ADMIN — OXYCODONE HYDROCHLORIDE 10 MILLIGRAM(S): 5 TABLET ORAL at 12:19

## 2022-01-01 RX ADMIN — NALOXEGOL OXALATE 25 MILLIGRAM(S): 12.5 TABLET, FILM COATED ORAL at 17:35

## 2022-01-01 RX ADMIN — OXYCODONE HYDROCHLORIDE 10 MILLIGRAM(S): 5 TABLET ORAL at 17:00

## 2022-01-01 RX ADMIN — HYDROMORPHONE HYDROCHLORIDE 2 MILLIGRAM(S): 2 INJECTION INTRAMUSCULAR; INTRAVENOUS; SUBCUTANEOUS at 10:07

## 2022-01-01 RX ADMIN — ENOXAPARIN SODIUM 40 MILLIGRAM(S): 100 INJECTION SUBCUTANEOUS at 12:33

## 2022-01-01 RX ADMIN — HYDROMORPHONE HYDROCHLORIDE 2 MILLIGRAM(S): 2 INJECTION INTRAMUSCULAR; INTRAVENOUS; SUBCUTANEOUS at 08:31

## 2022-01-01 RX ADMIN — MAGNESIUM HYDROXIDE 30 MILLILITER(S): 400 TABLET, CHEWABLE ORAL at 05:37

## 2022-01-01 RX ADMIN — HYDROMORPHONE HYDROCHLORIDE 2.5 MILLIGRAM(S): 2 INJECTION INTRAMUSCULAR; INTRAVENOUS; SUBCUTANEOUS at 14:57

## 2022-01-01 RX ADMIN — PANTOPRAZOLE SODIUM 40 MILLIGRAM(S): 20 TABLET, DELAYED RELEASE ORAL at 22:22

## 2022-01-01 RX ADMIN — FENTANYL CITRATE 1 PATCH: 50 INJECTION INTRAVENOUS at 09:57

## 2022-01-01 RX ADMIN — Medication 1000 MILLIGRAM(S): at 16:23

## 2022-01-01 RX ADMIN — PANTOPRAZOLE SODIUM 40 MILLIGRAM(S): 20 TABLET, DELAYED RELEASE ORAL at 05:06

## 2022-01-01 RX ADMIN — PANTOPRAZOLE SODIUM 40 MILLIGRAM(S): 20 TABLET, DELAYED RELEASE ORAL at 05:52

## 2022-01-01 RX ADMIN — HYDROMORPHONE HYDROCHLORIDE 2 MILLIGRAM(S): 2 INJECTION INTRAMUSCULAR; INTRAVENOUS; SUBCUTANEOUS at 14:50

## 2022-01-01 RX ADMIN — HYDROMORPHONE HYDROCHLORIDE 2 MILLIGRAM(S): 2 INJECTION INTRAMUSCULAR; INTRAVENOUS; SUBCUTANEOUS at 13:29

## 2022-01-01 RX ADMIN — HYDROMORPHONE HYDROCHLORIDE 0.5 MILLIGRAM(S): 2 INJECTION INTRAMUSCULAR; INTRAVENOUS; SUBCUTANEOUS at 15:13

## 2022-01-01 RX ADMIN — HYDROMORPHONE HYDROCHLORIDE 30 MILLILITER(S): 2 INJECTION INTRAMUSCULAR; INTRAVENOUS; SUBCUTANEOUS at 21:24

## 2022-01-01 RX ADMIN — Medication 975 MILLIGRAM(S): at 18:00

## 2022-01-01 RX ADMIN — FENTANYL CITRATE 1 PATCH: 50 INJECTION INTRAVENOUS at 18:43

## 2022-01-01 RX ADMIN — ONDANSETRON 4 MILLIGRAM(S): 8 TABLET, FILM COATED ORAL at 02:51

## 2022-01-01 RX ADMIN — HYDROMORPHONE HYDROCHLORIDE 2 MILLIGRAM(S): 2 INJECTION INTRAMUSCULAR; INTRAVENOUS; SUBCUTANEOUS at 16:41

## 2022-01-01 RX ADMIN — FENTANYL CITRATE 1 PATCH: 50 INJECTION INTRAVENOUS at 18:33

## 2022-01-01 RX ADMIN — Medication 400 MILLIGRAM(S): at 15:56

## 2022-01-01 RX ADMIN — Medication 1 TABLET(S): at 12:09

## 2022-01-01 RX ADMIN — OXYCODONE HYDROCHLORIDE 10 MILLIGRAM(S): 5 TABLET ORAL at 14:25

## 2022-01-01 RX ADMIN — MAGNESIUM HYDROXIDE 30 MILLILITER(S): 400 TABLET, CHEWABLE ORAL at 05:34

## 2022-01-01 RX ADMIN — Medication 100 MILLIGRAM(S): at 22:01

## 2022-01-01 RX ADMIN — HYDROMORPHONE HYDROCHLORIDE 1 MILLIGRAM(S): 2 INJECTION INTRAMUSCULAR; INTRAVENOUS; SUBCUTANEOUS at 23:07

## 2022-01-01 RX ADMIN — FENTANYL CITRATE 1 PATCH: 50 INJECTION INTRAVENOUS at 15:02

## 2022-01-01 RX ADMIN — HYDROMORPHONE HYDROCHLORIDE 10 MILLIGRAM(S): 2 INJECTION INTRAMUSCULAR; INTRAVENOUS; SUBCUTANEOUS at 20:18

## 2022-01-01 RX ADMIN — HYDROMORPHONE HYDROCHLORIDE 2 MILLIGRAM(S): 2 INJECTION INTRAMUSCULAR; INTRAVENOUS; SUBCUTANEOUS at 12:17

## 2022-01-01 RX ADMIN — FENTANYL CITRATE 1 PATCH: 50 INJECTION INTRAVENOUS at 07:43

## 2022-07-13 PROBLEM — R91.8 LUNG MASS: Status: ACTIVE | Noted: 2022-01-01

## 2022-07-13 PROBLEM — Z87.442 HISTORY OF KIDNEY STONES: Status: RESOLVED | Noted: 2022-01-01 | Resolved: 2022-01-01

## 2022-07-13 PROBLEM — M89.9 BONE LESION: Status: ACTIVE | Noted: 2022-01-01

## 2022-07-13 PROBLEM — Z78.9 NO FAMILY HISTORY OF CANCER: Status: ACTIVE | Noted: 2022-01-01

## 2022-07-18 PROBLEM — C79.51 BONE METASTASES: Status: ACTIVE | Noted: 2022-01-01

## 2022-07-18 NOTE — PHYSICAL EXAM
[Abdomen Soft] : soft [Nondistended] : nondistended [Abdomen Tenderness] : non-tender [Cervical Lymph Nodes Enlarged Posterior Bilaterally] : posterior cervical [Cervical Lymph Nodes Enlarged Anterior Bilaterally] : anterior cervical [Supraclavicular Lymph Nodes Enlarged Bilaterally] : supraclavicular [Axillary Lymph Nodes Enlarged Bilaterally] : axillary [No Focal Deficits] : no focal deficits [Normal] : oriented to person, place and time, the affect was normal, the mood was normal and not anxious [de-identified] : Decreased range of motion at the right hip due to sevre pain; no bony ttp anywhere along axial skeleton, spine, ribs, or hips [de-identified] : Patient has difficulty w/ extension/flexion at the right hip due to pain but does not appear to have diminished strength; remainder of the neuro exam is unremarkable with no focal deficits or sensory changes; patient uses a wheelchair due to pain with ambulation

## 2022-07-18 NOTE — HISTORY OF PRESENT ILLNESS
[FreeTextEntry1] : Ms Sanders is a 62 year old female with stage IV metastatic disease. Referred by DR Melendez for palliative radiation to the right acetabulum\par \par Patient developed new right pelvic pain and difficulty walking with right leg weakness due to pain. \par \par She was evaluated @ [facility] ER on 6/26/2022\par \par CT L spine showed 1.5 cm lytic lesion of the right lateral L1 vertebral body cortex with associated cortical breakthrough and small soft tissue component.\par \par Chest X Ray showed a spiculated mass at the Right midlung level measuring 1.6 cm\par \par CT pelvis showed 7.7 x 6.9 x 4.2 cm destructive lesion with soft tissue mass involving the medial column of the right acetabulum and extending into the adjacent ischium. \par \par 7/6/2022 PET/CT IMPRESSION\par 2 cm hypermetabolic nodule in the superior segment of the RLL suspicious for malignant process. Associated hypermetabolic right hilar lymphadenopathy and subcarinal lymphadenopathy is suspicious for heydi metastatic disease. Trace right pleural effusion with pleural uptake. Malignant pleural effusion is not excluded. Hypermetabolic osseous metastasis including 1.6 cm L1 Lytic lesion and 7.1 cm destructive osseous lesion involving the right acetabulum \par \par Brain MRI  to be Done Saturday July 22nd\par \par Today the patient is having a lot of pain in the right groin and buttock area.  She is taking Percocet  5/325 two tabs with minimal relief.  She has no SOB.  Her appetite is decreased and she is still smoking.

## 2022-07-18 NOTE — VITALS
[Maximal Pain Intensity: 9/10] : 9/10 [Least Pain Intensity: 9/10] : 9/10 [Pain Location: ___] : Pain Location: [unfilled] [Pain Interferes with ADLs] : Pain interferes with activities of daily living. [Opioid] : opioid [70: Cares for self; unalbe to carry on normal activity or do active work.] : 70: Cares for self; unable to carry on normal activity or do active work. [ECOG Performance Status: 3 - Capable of only limited self care, confined to bed or chair more than 50% of waking hours] : Performance Status: 3 - Capable of only limited self care, confined to bed or chair more than 50% of waking hours [9 - Distress Level] : Distress Level: 9 [Referred Patient  to social work for follow-up] : Patient was referred to social work for follow-up

## 2022-07-28 NOTE — H&P ADULT - HISTORY OF PRESENT ILLNESS
62y Female presents as direct transfer from [facility], c/o R hip pain that begain in May, then this past weekend was unable to bear weight and went to nearby hospital, where she was found to have R acetabular lesion, lung lesions and brain lesions per pt. R hip lesion was biopsied at prior hospital and consistent with metastatic lung cancer. Patient denies numbness. Denies trauma. Patient denies headstrike or LOC. Patient denies radiation of pain. Patient denies numbness/tingling/burning in the RLE. No other bone/joint complaints. Patient is a community ambulator at baseline without assistive devices.    PAST MEDICAL & SURGICAL HISTORY:    MEDICATIONS  (STANDING):  acetaminophen     Tablet .. 975 milliGRAM(s) Oral every 8 hours  enoxaparin Injectable 40 milliGRAM(s) SubCutaneous every 24 hours  senna 2 Tablet(s) Oral at bedtime  sodium chloride 0.9%. 1000 milliLiter(s) (75 mL/Hr) IV Continuous <Continuous>    MEDICATIONS  (PRN):  HYDROmorphone  Injectable 0.5 milliGRAM(s) IV Push every 6 hours PRN Severe Pain (7 - 10)  magnesium hydroxide Suspension 30 milliLiter(s) Oral daily PRN Constipation  melatonin 3 milliGRAM(s) Oral at bedtime PRN Insomnia  oxyCODONE    IR 2.5 milliGRAM(s) Oral every 4 hours PRN Moderate Pain (4 - 6)  oxyCODONE    IR 5 milliGRAM(s) Oral every 4 hours PRN Severe Pain (7 - 10)    Allergies    No Known Allergies    Intolerances                    T(C): 36.7 (07-28-22 @ 23:04), Max: 36.7 (07-28-22 @ 23:04)  HR: 114 (07-28-22 @ 23:04) (114 - 114)  BP: 126/87 (07-28-22 @ 23:04) (126/87 - 126/87)  RR: 18 (07-28-22 @ 23:04) (18 - 18)  SpO2: 93% (07-28-22 @ 23:04) (93% - 93%)  Wt(kg): --    PE   RLE:  Skin intact; No ecchymosis/soft tissue swelling  Compartments soft; + TTP about hip. No TTP to knee/leg/ankle/foot   ROM lmited 2/2 pain   Unable to SLR;   Motor intact GS/TA/FHL/EHL  SILT L2-S1  +dp    LLE/BUE:   No bony TTP; Good ROM w/o pain; Exam Unremarkable    Imaging:  XR and CT showing R acetabular lesion    62y Female with R acetabular lesion    - PWB RLE with rolling walker  - Pain control  - FU labs   - FU medicine and heme onc clearance  - Lovenox for dvt ppx  - Plan for OR Wednesday 8/3

## 2022-07-29 NOTE — CONSULT NOTE ADULT - TIME BILLING
Discussed treatment plan with patient at bedside.   I am a non participating [employer] physician seeing Pt in coverage for Dr Monsalve Discussed treatment plan with patient at bedside.   I am a non participating [employer] physician seeing Pt in coverage for Dr Monsalve. The above patient documentation by Dr. Wesley was reviewed and I agree with his evaluation, assessment and treatment plan.  Rohan Monsalve M.D.

## 2022-07-29 NOTE — PROGRESS NOTE ADULT - SUBJECTIVE AND OBJECTIVE BOX
Pt seen/examined. Direct transfer to [address] [place] [address] overnight from outside hospital. Reports right hip pain and pelvis pain. Reports pain radiating down her RLE to the dorsum of the right foot. Denies SOB, chest pain, difficulty breathing.     T(C): 36.7 (07-29-22 @ 04:44), Max: 36.7 (07-28-22 @ 23:04)  HR: 115 (07-29-22 @ 04:44) (114 - 115)  BP: 134/86 (07-29-22 @ 04:44) (126/87 - 134/86)  RR: 18 (07-29-22 @ 04:44) (18 - 18)  SpO2: 94% (07-29-22 @ 04:44) (93% - 94%)  Wt(kg): --    Gen: awake, alert, NAD  Resp: no increased work of breathing  RLE:  Biopsy site on the posterior aspect of the buttocks  Limited hip and knee ROM due to pain  +EHL/FHL/TA/GS  SILT S/S/SP/DP  +DP Pulses  Compartments soft  No calf TTP                         10.3   14.71 )-----------( 426      ( 29 Jul 2022 00:36 )             32.0       07-29    135  |  94<L>  |  16  ----------------------------<  137<H>  4.4   |  28  |  0.49<L>        PT/INR - ( 29 Jul 2022 00:36 )   PT: 17.1 sec;   INR: 1.48 ratio         PTT - ( 29 Jul 2022 00:36 )  PTT:33.2 sec    A/P: 61yo Female with right acetabular lesion    - Pain control  - mechanical/DVT ppx: lovenox  - OOB  - NWB RLE   - FU AM labs  - FU Further imaging

## 2022-07-29 NOTE — CONSULT NOTE ADULT - SUBJECTIVE AND OBJECTIVE BOX
62y Female presents as direct transfer from [facility], c/o R hip pain that begain in May, then this past weekend was unable to bear weight and went to nearby hospital, where she was found to have R acetabular lesion, lung lesions and brain lesions per pt. R hip lesion was biopsied at prior hospital and consistent with metastatic lung cancer. Patient denies numbness. Denies trauma. Patient denies headstrike or LOC. Patient denies radiation of pain. Patient denies numbness/tingling/burning in the RLE. No other bone/joint complaints. Patient is a community ambulator at baseline without assistive devices. Patient seen now resting with continue complaint of pain in the right leg    PAST MEDICAL & SURGICAL HISTORY:    Lung CA with mets to bone and brain      MEDICATIONS  (STANDING):  acetaminophen     Tablet .. 975 milliGRAM(s) Oral every 8 hours  enoxaparin Injectable 40 milliGRAM(s) SubCutaneous every 24 hours  fentaNYL   Patch  25 MICROgram(s)/Hr 1 Patch Transdermal every 72 hours  senna 2 Tablet(s) Oral at bedtime  sodium chloride 0.9%. 1000 milliLiter(s) (75 mL/Hr) IV Continuous <Continuous>    MEDICATIONS  (PRN):  diazepam    Tablet 5 milliGRAM(s) Oral three times a day PRN muscle spasms  HYDROmorphone  Injectable 0.5 milliGRAM(s) IV Push every 6 hours PRN Severe Pain (7 - 10) - breakthrough pain  magnesium hydroxide Suspension 30 milliLiter(s) Oral daily PRN Constipation  melatonin 3 milliGRAM(s) Oral at bedtime PRN Insomnia  oxyCODONE    IR 5 milliGRAM(s) Oral every 4 hours PRN Moderate Pain (4 - 6)  oxyCODONE    IR 10 milliGRAM(s) Oral every 4 hours PRN Severe Pain (7 - 10)    Social Hx:  Tobacco: + smoker quit  5/22  ETOH: Neg  Drugs: Neg    Family Hx:  As per my conversation with the patient, non contributory     ROS  CONSTITUTIONAL: No weakness, fevers or chills  EYES/ENT: No visual changes;  No vertigo or throat pain   NECK: No pain or stiffness  RESPIRATORY: No cough, wheezing, hemoptysis; No shortness of breath  CARDIOVASCULAR: No chest pain or palpitations  GASTROINTESTINAL: No abdominal or epigastric pain. No nausea, vomiting, or hematemesis; No diarrhea or constipation. No melena or hematochezia.  GENITOURINARY: No dysuria, frequency or hematuria  NEUROLOGICAL: No numbness or weakness  SKIN: No itching, burning, rashes, or lesions   MUSCULOSKELETAL: right leg pain    INTERVAL HPI/OVERNIGHT EVENTS:  T(C): 36.8 (07-29-22 @ 13:20), Max: 36.9 (07-29-22 @ 08:40)  HR: 111 (07-29-22 @ 13:20) (110 - 115)  BP: 122/79 (07-29-22 @ 13:20) (122/79 - 134/86)  RR: 18 (07-29-22 @ 13:20) (18 - 18)  SpO2: 93% (07-29-22 @ 13:20) (93% - 95%)  Wt(kg): --  I&O's Summary    28 Jul 2022 07:01  -  29 Jul 2022 07:00  --------------------------------------------------------  IN: 670 mL / OUT: 200 mL / NET: 470 mL    29 Jul 2022 07:01  -  29 Jul 2022 14:57  --------------------------------------------------------  IN: 600 mL / OUT: 0 mL / NET: 600 mL        PHYSICAL EXAM:  GENERAL: NAD, well-groomed, well-developed  HEAD:  Atraumatic, Normocephalic  EYES: EOMI, PERRLA, conjunctiva and sclera clear  ENMT: No tonsillar erythema, exudates, or enlargement; Moist mucous membranes, Good dentition, No lesions  NECK: Supple, No JVD, Normal thyroid  NERVOUS SYSTEM:  Alert & Oriented X3, Good concentration; Motor Strength 5/5 B/L upper and lower extremities; DTRs 2+ intact and symmetric  CHEST/LUNG: Clear to percussion bilaterally; No rales, rhonchi, wheezing, or rubs  HEART: Regular rate and rhythm; No murmurs, rubs, or gallops  ABDOMEN: Soft, Nontender, Nondistended; Bowel sounds present  EXTREMITIES:  2+ Peripheral Pulses, No clubbing, cyanosis, or edema  LYMPH: No lymphadenopathy noted  SKIN: No rashes or lesions        LABS:                        10.3   14.71 )-----------( 426      ( 29 Jul 2022 00:36 )             32.0     07-29    135  |  94<L>  |  16  ----------------------------<  137<H>  4.4   |  28  |  0.49<L>    Ca    9.2      29 Jul 2022 00:36    TPro  x   /  Alb  3.3  /  TBili  x   /  DBili  x   /  AST  x   /  ALT  x   /  AlkPhos  x   07-29    PT/INR - ( 29 Jul 2022 00:36 )   PT: 17.1 sec;   INR: 1.48 ratio         PTT - ( 29 Jul 2022 00:36 )  PTT:33.2 sec    EKG pending

## 2022-07-29 NOTE — CONSULT NOTE ADULT - PROBLEM SELECTOR RECOMMENDATION 4
continue to monitor for seizure activity  Patient has been seizure free without any surgical intervention so no seizure meds are needed at this time

## 2022-07-30 NOTE — PROGRESS NOTE ADULT - ASSESSMENT
61 yo woman with recently diagnosed lung CA with meds to brain and bone presents with a right pathologic hip fracture scheduled for an Open reduction and internal fixation

## 2022-07-30 NOTE — PROGRESS NOTE ADULT - SUBJECTIVE AND OBJECTIVE BOX
62y Female presents as direct transfer from [facility], c/o R hip pain that begain in May, then this past weekend was unable to bear weight and went to nearby hospital, where she was found to have R acetabular lesion, lung lesions and brain lesions per pt. R hip lesion was biopsied at prior hospital and consistent with metastatic lung cancer. Patient denies numbness. Denies trauma. Patient denies headstrike or LOC. Patient denies radiation of pain. Patient denies numbness/tingling/burning in the RLE. No other bone/joint complaints. Patient is a community ambulator at baseline without assistive devices. Patient seen now resting with continue complaint of pain in the right leg. Patient with an episode of coffee ground emesis last night. Seen by GI this am and is scheduled for a CT scan of the abd       MEDICATIONS  (STANDING):  acetaminophen     Tablet .. 975 milliGRAM(s) Oral every 8 hours  famotidine Injectable 20 milliGRAM(s) IV Push two times a day  fentaNYL   Patch  25 MICROgram(s)/Hr 1 Patch Transdermal every 72 hours  lactated ringers. 1000 milliLiter(s) (100 mL/Hr) IV Continuous <Continuous>  prochlorperazine   Injectable 10 milliGRAM(s) IV Push once  senna 2 Tablet(s) Oral at bedtime    MEDICATIONS  (PRN):  diazepam    Tablet 5 milliGRAM(s) Oral three times a day PRN muscle spasms  HYDROmorphone  Injectable 0.5 milliGRAM(s) IV Push every 6 hours PRN Severe Pain (7 - 10) - breakthrough pain  magnesium hydroxide Suspension 30 milliLiter(s) Oral daily PRN Constipation  melatonin 3 milliGRAM(s) Oral at bedtime PRN Insomnia  ondansetron Injectable 4 milliGRAM(s) IV Push every 6 hours PRN Nausea and/or Vomiting  oxyCODONE    IR 5 milliGRAM(s) Oral every 4 hours PRN Moderate Pain (4 - 6)  oxyCODONE    IR 10 milliGRAM(s) Oral every 4 hours PRN Severe Pain (7 - 10)          VITALS:   T(C): 36.9 (07-30-22 @ 08:30), Max: 37.5 (07-29-22 @ 17:09)  HR: 111 (07-30-22 @ 08:30) (109 - 112)  BP: 130/67 (07-30-22 @ 08:30) (121/77 - 138/86)  RR: 16 (07-30-22 @ 08:30) (16 - 18)  SpO2: 93% (07-30-22 @ 08:30) (92% - 94%)  Wt(kg): --      PHYSICAL EXAM:  GENERAL: NAD, well-groomed, well-developed  HEAD:  Atraumatic, Normocephalic  EYES: EOMI, PERRLA, conjunctiva and sclera clear  ENMT: No tonsillar erythema, exudates, or enlargement; Moist mucous membranes, Good dentition, No lesions  NECK: Supple, No JVD, Normal thyroid  NERVOUS SYSTEM:  Alert & Oriented X3, Good concentration;   CHEST/LUNG: Clear to percussion bilaterally; No rales, rhonchi, wheezing, or rubs  HEART: Regular rate and rhythm; No murmurs, rubs, or gallops  ABDOMEN: Soft, Nontender, Nondistended; Bowel sounds present  EXTREMITIES:  2+ Peripheral Pulses, No clubbing, cyanosis, or edema  LYMPH: No lymphadenopathy noted  SKIN: No rashes or lesions      LABS:        CBC Full  -  ( 30 Jul 2022 07:29 )  WBC Count : 21.52 K/uL  RBC Count : 3.53 M/uL  Hemoglobin : 10.2 g/dL  Hematocrit : 32.4 %  Platelet Count - Automated : 460 K/uL  Mean Cell Volume : 91.8 fl  Mean Cell Hemoglobin : 28.9 pg  Mean Cell Hemoglobin Concentration : 31.5 gm/dL  Auto Neutrophil # : x  Auto Lymphocyte # : x  Auto Monocyte # : x  Auto Eosinophil # : x  Auto Basophil # : x  Auto Neutrophil % : x  Auto Lymphocyte % : x  Auto Monocyte % : x  Auto Eosinophil % : x  Auto Basophil % : x    07-30    136  |  95<L>  |  22  ----------------------------<  115<H>  4.9   |  27  |  0.53    Ca    9.2      30 Jul 2022 07:31    TPro  6.9  /  Alb  2.8<L>  /  TBili  0.3  /  DBili  x   /  AST  44<H>  /  ALT  58<H>  /  AlkPhos  247<H>  07-30    LIVER FUNCTIONS - ( 30 Jul 2022 07:31 )  Alb: 2.8 g/dL / Pro: 6.9 g/dL / ALK PHOS: 247 U/L / ALT: 58 U/L / AST: 44 U/L / GGT: x           PT/INR - ( 30 Jul 2022 07:30 )   PT: 17.5 sec;   INR: 1.50 ratio         PTT - ( 30 Jul 2022 07:30 )  PTT:32.6 sec    CAPILLARY BLOOD GLUCOSE          RADIOLOGY & ADDITIONAL TESTS:

## 2022-07-30 NOTE — PROGRESS NOTE ADULT - ASSESSMENT
n/v with distended abdomen post pathologic leg fx from metatsttic ca  abdomne distended with decreased bowewl sounds  likely ilus less likely sbo can be ogilvies  pt requesting enema which is fine  d/w team, would get ct scan can be non contrast for now.    reglan likely for nausea iv 5mg four times a day  ngt if ileus/ obstruction and vomiting continues  check mg, phos, may need to hold narcotics.

## 2022-07-30 NOTE — PROGRESS NOTE ADULT - PROBLEM SELECTOR PLAN 2
continue fentanyl patch Q72h  Dilaudid q6h for breakthrough  May need to increase the frequecy  consider a pain management eval.

## 2022-07-30 NOTE — CONSULT NOTE ADULT - SUBJECTIVE AND OBJECTIVE BOX
HPI:  62y Female, heavy smoker, presents as direct transfer from [facility] for right pathological hip fracture. She’s been complaining of R hip pain that began in May. She was seeing Dr. Nancie Mora as an outpatient for oncology [facility] and was worked up for the right hip lesion, lung nodules and brain lesions. R hip lesion was biopsied 2 weeks ago and was consistent with metastatic lung cancer. Last weekend, she suddenly became unable to bear weight and was found to have a right acetabular fracture at nearby hospital and was transferred to [facility]. Ortho plan to operate on her right pathological hip/acetabular fracture on 8/3. Heme onc consulted for help with management of metastatic cancer. Outside records in chart. Patient ambulates well at baseline without assistive devices.    PAST MEDICAL & SURGICAL HISTORY:    MEDICATIONS  (STANDING):  acetaminophen     Tablet .. 975 milliGRAM(s) Oral every 8 hours  enoxaparin Injectable 40 milliGRAM(s) SubCutaneous every 24 hours  senna 2 Tablet(s) Oral at bedtime  sodium chloride 0.9%. 1000 milliLiter(s) (75 mL/Hr) IV Continuous <Continuous>    MEDICATIONS  (PRN):  HYDROmorphone  Injectable 0.5 milliGRAM(s) IV Push every 6 hours PRN Severe Pain (7 - 10)  magnesium hydroxide Suspension 30 milliLiter(s) Oral daily PRN Constipation  melatonin 3 milliGRAM(s) Oral at bedtime PRN Insomnia  oxyCODONE    IR 2.5 milliGRAM(s) Oral every 4 hours PRN Moderate Pain (4 - 6)  oxyCODONE    IR 5 milliGRAM(s) Oral every 4 hours PRN Severe Pain (7 - 10)    Allergies    No Known Allergies    Intolerances                    T(C): 36.7 (07-28-22 @ 23:04), Max: 36.7 (07-28-22 @ 23:04)  HR: 114 (07-28-22 @ 23:04) (114 - 114)  BP: 126/87 (07-28-22 @ 23:04) (126/87 - 126/87)  RR: 18 (07-28-22 @ 23:04) (18 - 18)  SpO2: 93% (07-28-22 @ 23:04) (93% - 93%)  Wt(kg): --    PE   RLE:  Skin intact; No ecchymosis/soft tissue swelling  Compartments soft; + TTP about hip. No TTP to knee/leg/ankle/foot   ROM lmited 2/2 pain   Unable to SLR;   Motor intact GS/TA/FHL/EHL  SILT L2-S1  +dp    LLE/BUE:   No bony TTP; Good ROM w/o pain; Exam Unremarkable    Imaging:  XR and CT showing R acetabular lesion    62y Female with R acetabular lesion    - PWB RLE with rolling walker  - Pain control  - FU labs   - FU medicine and heme onc clearance  - Lovenox for dvt ppx  - Plan for OR Wednesday 8/3   (28 Jul 2022 23:54)      PAST MEDICAL & SURGICAL HISTORY:      Allergies    No Known Allergies    Intolerances        MEDICATIONS  (STANDING):  acetaminophen     Tablet .. 975 milliGRAM(s) Oral every 8 hours  famotidine Injectable 20 milliGRAM(s) IV Push two times a day  fentaNYL   Patch  25 MICROgram(s)/Hr 1 Patch Transdermal every 72 hours  lactated ringers. 1000 milliLiter(s) (100 mL/Hr) IV Continuous <Continuous>  prochlorperazine   Injectable 10 milliGRAM(s) IV Push once  senna 2 Tablet(s) Oral at bedtime    MEDICATIONS  (PRN):  acetaminophen   IVPB .. 1000 milliGRAM(s) IV Intermittent once PRN Severe Pain (7 - 10)  diazepam    Tablet 5 milliGRAM(s) Oral three times a day PRN muscle spasms  HYDROmorphone  Injectable 0.5 milliGRAM(s) IV Push every 6 hours PRN Severe Pain (7 - 10) - breakthrough pain  magnesium hydroxide Suspension 30 milliLiter(s) Oral daily PRN Constipation  melatonin 3 milliGRAM(s) Oral at bedtime PRN Insomnia  ondansetron Injectable 4 milliGRAM(s) IV Push every 6 hours PRN Nausea and/or Vomiting  oxyCODONE    IR 5 milliGRAM(s) Oral every 4 hours PRN Moderate Pain (4 - 6)  oxyCODONE    IR 10 milliGRAM(s) Oral every 4 hours PRN Severe Pain (7 - 10)      FAMILY HISTORY:  No family history of cancer    SOCIAL HISTORY: No EtOH, heavy smoker ( 1/2 pack per day for 40 yrs)    REVIEW OF SYSTEMS:    CONSTITUTIONAL: No weakness, fevers or chills  EYES/ENT: No visual changes;  No vertigo or throat pain   NECK: No pain or stiffness  RESPIRATORY: No cough, wheezing, hemoptysis; No shortness of breath  CARDIOVASCULAR: No chest pain or palpitations  GASTROINTESTINAL: No abdominal or epigastric pain. No nausea, vomiting, or hematemesis; No diarrhea, reports constipation. No melena or hematochezia.  GENITOURINARY: No dysuria, frequency or hematuria  NEUROLOGICAL: No numbness or weakness  SKIN: No itching, burning, rashes, or lesions   All other review of systems is negative unless indicated above.        T(F): 98.4 (07-30-22 @ 08:30), Max: 99.5 (07-29-22 @ 17:09)  HR: 111 (07-30-22 @ 08:30)  BP: 130/67 (07-30-22 @ 08:30)  RR: 16 (07-30-22 @ 08:30)  SpO2: 93% (07-30-22 @ 08:30)  Wt(kg): --    GENERAL: NAD, well-developed  HEAD:  Atraumatic, Normocephalic  EYES: EOMI, PERRLA, conjunctiva and sclera clear  NECK: Supple, No JVD  CHEST/LUNG: Clear to auscultation bilaterally; No wheeze  HEART: Regular rate and rhythm; No murmurs, rubs, or gallops  ABDOMEN: Soft, Nontender, slightly distended; Bowel sounds present  EXTREMITIES:  2+ Peripheral Pulses, No clubbing, cyanosis, or edema. unable to lift right leg due to pain.  NEUROLOGY: non-focal  SKIN: No rashes or lesions                          10.2   21.52 )-----------( 460      ( 30 Jul 2022 07:29 )             32.4       07-30    136  |  95<L>  |  22  ----------------------------<  115<H>  4.9   |  27  |  0.53    Ca    9.2      30 Jul 2022 07:31    TPro  6.9  /  Alb  2.8<L>  /  TBili  0.3  /  DBili  x   /  AST  44<H>  /  ALT  58<H>  /  AlkPhos  247<H>  07-30          PT/INR - ( 30 Jul 2022 07:30 )   PT: 17.5 sec;   INR: 1.50 ratio         PTT - ( 30 Jul 2022 07:30 )  PTT:32.6 sec

## 2022-07-30 NOTE — CONSULT NOTE ADULT - ATTENDING COMMENTS
62y Female, heavy smoker, with recently diagnosed metastatic NSCLC presents as direct transfer from [facility] for right pathological hip fracture. Plan for OR on 8/3. She will need rehab after surgery then continue RT and chemo as outpatient with her rad onc and primary oncologist. Continue pain control. Plan d/w pt and spouse at bedside.

## 2022-07-30 NOTE — PROGRESS NOTE ADULT - SUBJECTIVE AND OBJECTIVE BOX
Post op Day [ ]  Hospital Day [3 ]     Events of last evening noted  nausea w/ vomiting x 2  Now NPO    T(C): 36.9 (07-30-22 @ 05:19), Max: 37.5 (07-29-22 @ 17:09)  HR: 112 (07-30-22 @ 05:19) (109 - 112)  BP: 138/86 (07-30-22 @ 05:19) (121/77 - 138/86)  RR: 16 (07-30-22 @ 05:19) (16 - 18)  SpO2: 94% (07-30-22 @ 05:19) (92% - 95%)  Wt(kg): --    Exam:    GEN:  A&0 x 3  ABD: soft slightly distended Quiet B/S  EXT:   RLE                   n/v/i  No significant changes                                                  10.7   18.18 )-----------( 478      ( 29 Jul 2022 21:44 )             33.7    07-29    135  |  94<L>  |  16  ----------------------------<  137<H>  4.4   |  28  |  0.49<L>    Ca    9.2      29 Jul 2022 00:36    TPro  x   /  Alb  3.3  /  TBili  x   /  DBili  x   /  AST  x   /  ALT  x   /  AlkPhos  x   07-29  PT/INR - ( 29 Jul 2022 00:36 )   PT: 17.1 sec;   INR: 1.48 ratio         PTT - ( 29 Jul 2022 00:36 )  PTT:33.2 sec    A/P  Patient S/P R Acetabular Lesion    Anti-emetics adjusted  Check Labs   DVT PPx: lovenox  Pain Control  Await GI input  PT: PWB RLE per Atending

## 2022-07-30 NOTE — PROGRESS NOTE ADULT - PROBLEM SELECTOR PLAN 1
Patient scheduled for an Open reduction and internal fixation of the right hip  No contraindication to scheduled procedure  NPO the evening prior to surgery  DVT and GI prophylaxis.

## 2022-07-30 NOTE — PROGRESS NOTE ADULT - SUBJECTIVE AND OBJECTIVE BOX
Patient is a 62y Female     Patient is a 62y old  Female who presents with a chief complaint of Pathologic hip fracture (29 Jul 2022 14:55)      HPI:  62y Female presents as direct transfer from [facility], c/o R hip pain that begain in May, then this past weekend was unable to bear weight and went to nearby hospital, where she was found to have R acetabular lesion, lung lesions and brain lesions per pt. R hip lesion was biopsied at prior hospital and consistent with metastatic lung cancer. Patient denies numbness. Denies trauma. Patient denies headstrike or LOC. Patient denies radiation of pain. Patient denies numbness/tingling/burning in the RLE. No other bone/joint complaints. Patient is a community ambulator at baseline without assistive devices.    PAST MEDICAL & SURGICAL HISTORY:    MEDICATIONS  (STANDING):  acetaminophen     Tablet .. 975 milliGRAM(s) Oral every 8 hours  enoxaparin Injectable 40 milliGRAM(s) SubCutaneous every 24 hours  senna 2 Tablet(s) Oral at bedtime  sodium chloride 0.9%. 1000 milliLiter(s) (75 mL/Hr) IV Continuous <Continuous>    MEDICATIONS  (PRN):  HYDROmorphone  Injectable 0.5 milliGRAM(s) IV Push every 6 hours PRN Severe Pain (7 - 10)  magnesium hydroxide Suspension 30 milliLiter(s) Oral daily PRN Constipation  melatonin 3 milliGRAM(s) Oral at bedtime PRN Insomnia  oxyCODONE    IR 2.5 milliGRAM(s) Oral every 4 hours PRN Moderate Pain (4 - 6)  oxyCODONE    IR 5 milliGRAM(s) Oral every 4 hours PRN Severe Pain (7 - 10)    Allergies    No Known Allergies    Intolerances                    T(C): 36.7 (07-28-22 @ 23:04), Max: 36.7 (07-28-22 @ 23:04)  HR: 114 (07-28-22 @ 23:04) (114 - 114)  BP: 126/87 (07-28-22 @ 23:04) (126/87 - 126/87)  RR: 18 (07-28-22 @ 23:04) (18 - 18)  SpO2: 93% (07-28-22 @ 23:04) (93% - 93%)  Wt(kg): --    PE   RLE:  Skin intact; No ecchymosis/soft tissue swelling  Compartments soft; + TTP about hip. No TTP to knee/leg/ankle/foot   ROM lmited 2/2 pain   Unable to SLR;   Motor intact GS/TA/FHL/EHL  SILT L2-S1  +dp    LLE/BUE:   No bony TTP; Good ROM w/o pain; Exam Unremarkable    Imaging:  XR and CT showing R acetabular lesion    62y Female with R acetabular lesion    - PWB RLE with rolling walker  - Pain control  - FU labs   - FU medicine and heme onc clearance  - Lovenox for dvt ppx  - Plan for OR Wednesday 8/3   (28 Jul 2022 23:54)      PAST MEDICAL & SURGICAL HISTORY:      MEDICATIONS  (STANDING):  acetaminophen     Tablet .. 975 milliGRAM(s) Oral every 8 hours  famotidine Injectable 20 milliGRAM(s) IV Push two times a day  fentaNYL   Patch  25 MICROgram(s)/Hr 1 Patch Transdermal every 72 hours  lactated ringers. 1000 milliLiter(s) (100 mL/Hr) IV Continuous <Continuous>  prochlorperazine   Injectable 10 milliGRAM(s) IV Push once  senna 2 Tablet(s) Oral at bedtime      Allergies    No Known Allergies    Intolerances        SOCIAL HISTORY:  Denies ETOh,Smoking,     FAMILY HISTORY:      REVIEW OF SYSTEMS:    CONSTITUTIONAL: No weakness, fevers or chills  EYES/ENT: No visual changes;  No vertigo or throat pain   NECK: No pain or stiffness  RESPIRATORY: No cough, wheezing, hemoptysis; No shortness of breath  CARDIOVASCULAR: No chest pain or palpitations  GASTROINTESTINAL: No abdominal or epigastric pain. No nausea, vomiting, or hematemesis; No diarrhea or constipation. No melena or hematochezia.  GENITOURINARY: No dysuria, frequency or hematuria  NEUROLOGICAL: No numbness or weakness  SKIN: No itching, burning, rashes, or lesions   All other review of systems is negative unless indicated above.    VITAL:  T(C): , Max: 37.5 (07-29-22 @ 17:09)  T(F): , Max: 99.5 (07-29-22 @ 17:09)  HR: 112 (07-30-22 @ 05:19)  BP: 138/86 (07-30-22 @ 05:19)  BP(mean): --  RR: 16 (07-30-22 @ 05:19)  SpO2: 94% (07-30-22 @ 05:19)  Wt(kg): --    I and O's:    07-28 @ 07:01  -  07-29 @ 07:00  --------------------------------------------------------  IN: 670 mL / OUT: 200 mL / NET: 470 mL    07-29 @ 07:01  -  07-30 @ 07:00  --------------------------------------------------------  IN: 720 mL / OUT: 350 mL / NET: 370 mL          PHYSICAL EXAM:    Constitutional: NAD  HEENT: PERRLA,   Neck: No JVD  Respiratory: CTA B/L  Cardiovascular: S1 and S2  Gastrointestinal: BS+, soft, NT/ND  Extremities: No peripheral edema  Neurological: A/O x 3, no focal deficits  Psychiatric: Normal mood, normal affect  : No Gimenez  Skin: No rashes  Access: Not applicable  Back: No CVA tenderness    LABS:                        10.2   21.52 )-----------( 460      ( 30 Jul 2022 07:29 )             32.4     07-30    136  |  95<L>  |  22  ----------------------------<  115<H>  4.9   |  27  |  0.53    Ca    9.2      30 Jul 2022 07:31    TPro  6.9  /  Alb  2.8<L>  /  TBili  0.3  /  DBili  x   /  AST  44<H>  /  ALT  58<H>  /  AlkPhos  247<H>  07-30          RADIOLOGY & ADDITIONAL STUDIES:

## 2022-07-30 NOTE — PROGRESS NOTE ADULT - PROBLEM SELECTOR PLAN 3
Patient has been following up with a oncologist in [place]  States she recently had a bone biopsy but doesn't have the results  will need to eventually start chemo +/- RT.

## 2022-07-31 NOTE — PROGRESS NOTE ADULT - ASSESSMENT
A/p: 62y Female with R Hip pain unable to bear weight on RLE and transferred from OSH, found to have R acetabular lesion. VSS. NAD.    Constipation: CT abdomen showing large stool burden, GI team following, Soap Suds enema and lactulose ordered and remaining NPO until bowel  movement.  Appreciate Hem/Onc team following.  Appreciate medicine team following.  PT/OT- Partial weight bearing RLE with Walker.  To be redetermined post operatively.  Possible OR for Mass Excision and Total Hip Arthroplasty for 8/3/22 with Dr. Lang.  IS  DVT PPx: To be determined post operatively.  Pain Control: Chronic Pain consult ordered.  Continue Current Tx.    Nilo Jorge PA-C  Orthopedic Surgery Team  Team Pager: [phone]

## 2022-07-31 NOTE — PROGRESS NOTE ADULT - PROBLEM SELECTOR PLAN 3
Patient has been following up with a oncologist in [place]  States she recently had a bone biopsy but doesn't have the results  will need to eventually start chemo +/- RT.

## 2022-07-31 NOTE — PROGRESS NOTE ADULT - PROBLEM SELECTOR PLAN 5
Patient found to be obstipated on CT scan  To receive an enema today  will need 2 MG of dilaudid prior to enema  consider starting Movantik

## 2022-07-31 NOTE — PROGRESS NOTE ADULT - SUBJECTIVE AND OBJECTIVE BOX
Patient seen bedside this AM, Patient with complaints of uncontrolled R Hip pain and nausea.  No chest pain or SOB.  T(C): 36.8 (07-31-22 @ 08:49), Max: 36.9 (07-30-22 @ 12:28)  HR: 105 (07-31-22 @ 08:49) (98 - 105)  BP: 139/83 (07-31-22 @ 08:49) (137/83 - 147/88)  RR: 18 (07-31-22 @ 08:49) (18 - 18)  SpO2: 98% (07-31-22 @ 08:49) (94% - 98%)  Wt(kg): --    Exam:  Alert and Oriented, No Acute Distress  Cardiac: Normal S1 & S2, RRR, No murmurs, rubs or gallops appreciated.  Pulmonary: 18bpm, normal breathing effort, no retractions, diminished lung sounds appreciated.  Bronchial/Vesicular lungs sounds appreciated throughout all lung lobes.  Lower Extremities:  Dressing: C/D/I w/ (type)  Calves Soft, Non-tender bilaterally  +PF/DF/EHL/FHL  SILT  +DP Pulse                              10.2   21.52 )-----------( 460      ( 30 Jul 2022 07:29 )             32.4    07-30    136  |  95<L>  |  22  ----------------------------<  115<H>  4.9   |  27  |  0.53    Ca    9.2      30 Jul 2022 07:31    TPro  6.9  /  Alb  2.8<L>  /  TBili  0.3  /  DBili  x   /  AST  44<H>  /  ALT  58<H>  /  AlkPhos  247<H>  07-30

## 2022-07-31 NOTE — PROGRESS NOTE ADULT - ASSESSMENT
pt with narendra  recommend decrease narcotics  golyte, reglan  enema unlikely to help  d/w pt and  the options  she agrees, if n/v will need ngt

## 2022-07-31 NOTE — PROGRESS NOTE ADULT - SUBJECTIVE AND OBJECTIVE BOX
62y Female presents as direct transfer from [facility], c/o R hip pain that begain in May, then this past weekend was unable to bear weight and went to nearby hospital, where she was found to have R acetabular lesion, lung lesions and brain lesions per pt. R hip lesion was biopsied at prior hospital and consistent with metastatic lung cancer. Patient denies numbness. Denies trauma. Patient denies headstrike or LOC. Patient denies radiation of pain. Patient denies numbness/tingling/burning in the RLE. No other bone/joint complaints. Patient is a community ambulator at baseline without assistive devices. Patient seen now resting with continue complaint of pain in the right leg. Patient s/p CT scan which shows stool in the colon. Patient with continue complaint of pain at the fracture site     MEDICATIONS  (STANDING):  acetaminophen   IVPB .. 1000 milliGRAM(s) IV Intermittent once  famotidine Injectable 20 milliGRAM(s) IV Push two times a day  fentaNYL   Patch  25 MICROgram(s)/Hr 1 Patch Transdermal every 72 hours  lactated ringers. 1000 milliLiter(s) (100 mL/Hr) IV Continuous <Continuous>  lactulose Syrup 20 Gram(s) Oral every 8 hours  prochlorperazine   Injectable 10 milliGRAM(s) IV Push once  senna 2 Tablet(s) Oral at bedtime  sodium chloride 0.9%. 1000 milliLiter(s) (100 mL/Hr) IV Continuous <Continuous>    MEDICATIONS  (PRN):  diazepam    Tablet 5 milliGRAM(s) Oral three times a day PRN muscle spasms  HYDROmorphone  Injectable 1 milliGRAM(s) IV Push every 6 hours PRN Severe Pain (7 - 10) - breakthrough pain  magnesium hydroxide Suspension 30 milliLiter(s) Oral daily PRN Constipation  melatonin 3 milliGRAM(s) Oral at bedtime PRN Insomnia  ondansetron Injectable 4 milliGRAM(s) IV Push every 6 hours PRN Nausea and/or Vomiting  oxyCODONE    IR 5 milliGRAM(s) Oral every 4 hours PRN Moderate Pain (4 - 6)  oxyCODONE    IR 10 milliGRAM(s) Oral every 4 hours PRN Severe Pain (7 - 10)          VITALS:   T(C): 36.8 (07-31-22 @ 08:49), Max: 36.9 (07-30-22 @ 12:28)  HR: 105 (07-31-22 @ 08:49) (98 - 105)  BP: 139/83 (07-31-22 @ 08:49) (137/83 - 147/88)  RR: 18 (07-31-22 @ 08:49) (18 - 18)  SpO2: 98% (07-31-22 @ 08:49) (94% - 98%)  Wt(kg): --      PHYSICAL EXAM:  GENERAL: NAD, well-groomed, well-developed  HEAD:  Atraumatic, Normocephalic  EYES: EOMI, PERRLA, conjunctiva and sclera clear  ENMT: No tonsillar erythema, exudates, or enlargement; Moist mucous membranes, Good dentition, No lesions  NECK: Supple, No JVD, Normal thyroid  NERVOUS SYSTEM:  Alert & Oriented X3, Good concentration;   CHEST/LUNG: Clear to percussion bilaterally; No rales, rhonchi, wheezing, or rubs  HEART: Regular rate and rhythm; No murmurs, rubs, or gallops  ABDOMEN: Soft, Nontender, Nondistended; Bowel sounds present  EXTREMITIES:  2+ Peripheral Pulses, No clubbing, cyanosis, or edema  LYMPH: No lymphadenopathy noted  SKIN: No rashes or lesions      LABS:        CBC Full  -  ( 30 Jul 2022 07:29 )  WBC Count : 21.52 K/uL  RBC Count : 3.53 M/uL  Hemoglobin : 10.2 g/dL  Hematocrit : 32.4 %  Platelet Count - Automated : 460 K/uL  Mean Cell Volume : 91.8 fl  Mean Cell Hemoglobin : 28.9 pg  Mean Cell Hemoglobin Concentration : 31.5 gm/dL  Auto Neutrophil # : x  Auto Lymphocyte # : x  Auto Monocyte # : x  Auto Eosinophil # : x  Auto Basophil # : x  Auto Neutrophil % : x  Auto Lymphocyte % : x  Auto Monocyte % : x  Auto Eosinophil % : x  Auto Basophil % : x    07-30    136  |  95<L>  |  22  ----------------------------<  115<H>  4.9   |  27  |  0.53    Ca    9.2      30 Jul 2022 07:31    TPro  6.9  /  Alb  2.8<L>  /  TBili  0.3  /  DBili  x   /  AST  44<H>  /  ALT  58<H>  /  AlkPhos  247<H>  07-30    LIVER FUNCTIONS - ( 30 Jul 2022 07:31 )  Alb: 2.8 g/dL / Pro: 6.9 g/dL / ALK PHOS: 247 U/L / ALT: 58 U/L / AST: 44 U/L / GGT: x           PT/INR - ( 30 Jul 2022 07:30 )   PT: 17.5 sec;   INR: 1.50 ratio         PTT - ( 30 Jul 2022 07:30 )  PTT:32.6 sec    CAPILLARY BLOOD GLUCOSE          RADIOLOGY & ADDITIONAL TESTS:

## 2022-07-31 NOTE — CHART NOTE - NSCHARTNOTEFT_GEN_A_CORE
ONCOLOGY FELLOW NOTE    62F heavy smoker, with recently diagnosed NSCLC with mets to bones and brain s/p few doses of palliative RT but not yet started on any chemotherapy/immunotherapy, presented as direct transfer from [facility] for right pathological hip fracture. Oncology contacted today to provide clearance for OR 8/3/22. Patient was seen and evaluated at bedside yesterday. Outside records and labs this admission reviewed today and discussed with attending oncologist Dr. Nancy Bragg.    No contraindications from an oncologic perspective for open reduction and internal fixation of right hip 8/3/22. Please page with any questions.      Sahil Clements MD  Hematology/Oncology Fellow PGY-4  Pager: [phone]   After 5pm and on weekends please page on-call fellow

## 2022-07-31 NOTE — PROGRESS NOTE ADULT - SUBJECTIVE AND OBJECTIVE BOX
Patient is a 62y Female     Patient is a 62y old  Female who presents with a chief complaint of R Acetabular lesion (31 Jul 2022 09:32)      HPI:  62y Female presents as direct transfer from [facility], c/o R hip pain that begain in May, then this past weekend was unable to bear weight and went to nearby hospital, where she was found to have R acetabular lesion, lung lesions and brain lesions per pt. R hip lesion was biopsied at prior hospital and consistent with metastatic lung cancer. Patient denies numbness. Denies trauma. Patient denies headstrike or LOC. Patient denies radiation of pain. Patient denies numbness/tingling/burning in the RLE. No other bone/joint complaints. Patient is a community ambulator at baseline without assistive devices.    PAST MEDICAL & SURGICAL HISTORY:    MEDICATIONS  (STANDING):  acetaminophen     Tablet .. 975 milliGRAM(s) Oral every 8 hours  enoxaparin Injectable 40 milliGRAM(s) SubCutaneous every 24 hours  senna 2 Tablet(s) Oral at bedtime  sodium chloride 0.9%. 1000 milliLiter(s) (75 mL/Hr) IV Continuous <Continuous>    MEDICATIONS  (PRN):  HYDROmorphone  Injectable 0.5 milliGRAM(s) IV Push every 6 hours PRN Severe Pain (7 - 10)  magnesium hydroxide Suspension 30 milliLiter(s) Oral daily PRN Constipation  melatonin 3 milliGRAM(s) Oral at bedtime PRN Insomnia  oxyCODONE    IR 2.5 milliGRAM(s) Oral every 4 hours PRN Moderate Pain (4 - 6)  oxyCODONE    IR 5 milliGRAM(s) Oral every 4 hours PRN Severe Pain (7 - 10)    Allergies    No Known Allergies    Intolerances                    T(C): 36.7 (07-28-22 @ 23:04), Max: 36.7 (07-28-22 @ 23:04)  HR: 114 (07-28-22 @ 23:04) (114 - 114)  BP: 126/87 (07-28-22 @ 23:04) (126/87 - 126/87)  RR: 18 (07-28-22 @ 23:04) (18 - 18)  SpO2: 93% (07-28-22 @ 23:04) (93% - 93%)  Wt(kg): --    PE   RLE:  Skin intact; No ecchymosis/soft tissue swelling  Compartments soft; + TTP about hip. No TTP to knee/leg/ankle/foot   ROM lmited 2/2 pain   Unable to SLR;   Motor intact GS/TA/FHL/EHL  SILT L2-S1  +dp    LLE/BUE:   No bony TTP; Good ROM w/o pain; Exam Unremarkable    Imaging:  XR and CT showing R acetabular lesion    62y Female with R acetabular lesion    - PWB RLE with rolling walker  - Pain control  - FU labs   - FU medicine and heme onc clearance  - Lovenox for dvt ppx  - Plan for OR Wednesday 8/3   (28 Jul 2022 23:54)      PAST MEDICAL & SURGICAL HISTORY:      MEDICATIONS  (STANDING):  acetaminophen   IVPB .. 1000 milliGRAM(s) IV Intermittent once  famotidine Injectable 20 milliGRAM(s) IV Push two times a day  fentaNYL   Patch  25 MICROgram(s)/Hr 1 Patch Transdermal every 72 hours  lactated ringers. 1000 milliLiter(s) (100 mL/Hr) IV Continuous <Continuous>  polyethylene glycol/electrolyte Solution. 4000 milliLiter(s) Oral once  prochlorperazine   Injectable 10 milliGRAM(s) IV Push once  senna 2 Tablet(s) Oral at bedtime  sodium chloride 0.9%. 1000 milliLiter(s) (100 mL/Hr) IV Continuous <Continuous>      Allergies    No Known Allergies    Intolerances        SOCIAL HISTORY:  Denies ETOh,Smoking,     FAMILY HISTORY:      REVIEW OF SYSTEMS:    CONSTITUTIONAL: No weakness, fevers or chills  EYES/ENT: No visual changes;  No vertigo or throat pain   NECK: No pain or stiffness  RESPIRATORY: No cough, wheezing, hemoptysis; No shortness of breath  CARDIOVASCULAR: No chest pain or palpitations  GASTROINTESTINAL: No abdominal or epigastric pain. No nausea, vomiting, or hematemesis; No diarrhea or constipation. No melena or hematochezia.  GENITOURINARY: No dysuria, frequency or hematuria  NEUROLOGICAL: No numbness or weakness  SKIN: No itching, burning, rashes, or lesions   All other review of systems is negative unless indicated above.    VITAL:  T(C): , Max: 36.9 (07-30-22 @ 12:28)  T(F): , Max: 98.4 (07-30-22 @ 12:28)  HR: 105 (07-31-22 @ 08:49)  BP: 139/83 (07-31-22 @ 08:49)  BP(mean): --  RR: 18 (07-31-22 @ 08:49)  SpO2: 98% (07-31-22 @ 08:49)  Wt(kg): --    I and O's:    07-29 @ 07:01  -  07-30 @ 07:00  --------------------------------------------------------  IN: 1480 mL / OUT: 350 mL / NET: 1130 mL    07-30 @ 07:01  -  07-31 @ 07:00  --------------------------------------------------------  IN: 1100 mL / OUT: 300 mL / NET: 800 mL          PHYSICAL EXAM:    Constitutional: NAD  HEENT: PERRLA,   Neck: No JVD  Respiratory: CTA B/L  Cardiovascular: S1 and S2  Gastrointestinal: BS+, soft, NT/ND  Extremities: No peripheral edema  Neurological: A/O x 3, no focal deficits  Psychiatric: Normal mood, normal affect  : No Gimenez  Skin: No rashes  Access: Not applicable  Back: No CVA tenderness    LABS:                        10.2   21.52 )-----------( 460      ( 30 Jul 2022 07:29 )             32.4     07-30    136  |  95<L>  |  22  ----------------------------<  115<H>  4.9   |  27  |  0.53    Ca    9.2      30 Jul 2022 07:31    TPro  6.9  /  Alb  2.8<L>  /  TBili  0.3  /  DBili  x   /  AST  44<H>  /  ALT  58<H>  /  AlkPhos  247<H>  07-30          RADIOLOGY & ADDITIONAL STUDIES:

## 2022-07-31 NOTE — PROGRESS NOTE ADULT - PROBLEM SELECTOR PLAN 2
continue fentanyl patch Q72h  Dilaudid q6h for breakthrough  May need to increase the frequency  consider a pain management eval.  will give 2mg if dilaudid IV for enema  Patient states she was taking Dilaudid IV while in [facility] without incident

## 2022-07-31 NOTE — PROGRESS NOTE ADULT - ASSESSMENT
63 yo woman with recently diagnosed lung CA with meds to brain and bone presents with a right pathologic hip fracture scheduled for an Open reduction and internal fixation

## 2022-08-01 NOTE — PROGRESS NOTE ADULT - PROBLEM SELECTOR PLAN 3
Patient has been following up with a oncologist in [place]  States she recently had a bone biopsy but doesn't have the results  will need to eventually start chemo +/- RT.

## 2022-08-01 NOTE — PROGRESS NOTE ADULT - PROBLEM SELECTOR PLAN 5
Patient found to be obstipated on CT scan  To receive another an enema today  will need 2 MG of dilaudid prior to enema  Movantikstarted

## 2022-08-01 NOTE — PROGRESS NOTE ADULT - SUBJECTIVE AND OBJECTIVE BOX
62y Female presents as direct transfer from [facility], c/o R hip pain that begain in May, then this past weekend was unable to bear weight and went to nearby hospital, where she was found to have R acetabular lesion, lung lesions and brain lesions per pt. R hip lesion was biopsied at prior hospital and consistent with metastatic lung cancer. Patient denies numbness. Denies trauma. Patient denies headstrike or LOC. Patient denies radiation of pain. Patient denies numbness/tingling/burning in the RLE. No other bone/joint complaints. Patient is a community ambulator at baseline without assistive devices. Patient seen now resting with continue complaint of pain in the right leg. Patient s/p CT scan which shows stool in the colon. Patient with continue complaint of pain at the fracture site. Patient with continue nausea and vomiting. Has not has a BM       MEDICATIONS  (STANDING):  fentaNYL   Patch  25 MICROgram(s)/Hr 1 Patch Transdermal every 72 hours  lactated ringers. 1000 milliLiter(s) (100 mL/Hr) IV Continuous <Continuous>  metoclopramide Injectable 10 milliGRAM(s) IV Push every 8 hours  pantoprazole  Injectable 40 milliGRAM(s) IV Push every 12 hours  prochlorperazine   Injectable 10 milliGRAM(s) IV Push once  saline laxative (FLEET) Rectal Enema 1 Enema Rectal every 24 hours  senna 2 Tablet(s) Oral at bedtime  sodium chloride 0.9%. 1000 milliLiter(s) (100 mL/Hr) IV Continuous <Continuous>    MEDICATIONS  (PRN):  HYDROmorphone  Injectable 1 milliGRAM(s) IV Push every 6 hours PRN Severe Pain (7 - 10) - breakthrough pain  magnesium hydroxide Suspension 30 milliLiter(s) Oral daily PRN Constipation  melatonin 3 milliGRAM(s) Oral at bedtime PRN Insomnia  ondansetron Injectable 4 milliGRAM(s) IV Push every 6 hours PRN Nausea and/or Vomiting  oxyCODONE    IR 5 milliGRAM(s) Oral every 4 hours PRN Moderate Pain (4 - 6)  oxyCODONE    IR 10 milliGRAM(s) Oral every 4 hours PRN Severe Pain (7 - 10)          VITALS:   T(C): 36.4 (08-01-22 @ 13:36), Max: 37 (08-01-22 @ 04:51)  HR: 107 (08-01-22 @ 13:36) (104 - 114)  BP: 148/87 (08-01-22 @ 13:36) (124/82 - 151/85)  RR: 18 (08-01-22 @ 13:36) (18 - 18)  SpO2: 93% (08-01-22 @ 13:36) (93% - 95%)  Wt(kg): --      PHYSICAL EXAM:  GENERAL: NAD, well-groomed, well-developed  HEAD:  Atraumatic, Normocephalic  EYES: EOMI, PERRLA, conjunctiva and sclera clear  ENMT: No tonsillar erythema, exudates, or enlargement; Moist mucous membranes, Good dentition, No lesions  NECK: Supple, No JVD, Normal thyroid  NERVOUS SYSTEM:  Alert & Oriented X3, Good concentration;   CHEST/LUNG: Clear to percussion bilaterally; No rales, rhonchi, wheezing, or rubs  HEART: Regular rate and rhythm; No murmurs, rubs, or gallops  ABDOMEN: Soft, Nontender, Nondistended; Bowel sounds present  EXTREMITIES:  2+ Peripheral Pulses, No clubbing, cyanosis, or edema  LYMPH: No lymphadenopathy noted  SKIN: No rashes or lesions    LABS:                      CAPILLARY BLOOD GLUCOSE          RADIOLOGY & ADDITIONAL TESTS:

## 2022-08-01 NOTE — PROGRESS NOTE ADULT - SUBJECTIVE AND OBJECTIVE BOX
Patient is a 62y Female     Patient is a 62y old  Female who presents with a chief complaint of abd distention (31 Jul 2022 11:20)      HPI:  62y Female presents as direct transfer from [facility], c/o R hip pain that begain in May, then this past weekend was unable to bear weight and went to nearby hospital, where she was found to have R acetabular lesion, lung lesions and brain lesions per pt. R hip lesion was biopsied at prior hospital and consistent with metastatic lung cancer. Patient denies numbness. Denies trauma. Patient denies headstrike or LOC. Patient denies radiation of pain. Patient denies numbness/tingling/burning in the RLE. No other bone/joint complaints. Patient is a community ambulator at baseline without assistive devices.    PAST MEDICAL & SURGICAL HISTORY:    MEDICATIONS  (STANDING):  acetaminophen     Tablet .. 975 milliGRAM(s) Oral every 8 hours  enoxaparin Injectable 40 milliGRAM(s) SubCutaneous every 24 hours  senna 2 Tablet(s) Oral at bedtime  sodium chloride 0.9%. 1000 milliLiter(s) (75 mL/Hr) IV Continuous <Continuous>    MEDICATIONS  (PRN):  HYDROmorphone  Injectable 0.5 milliGRAM(s) IV Push every 6 hours PRN Severe Pain (7 - 10)  magnesium hydroxide Suspension 30 milliLiter(s) Oral daily PRN Constipation  melatonin 3 milliGRAM(s) Oral at bedtime PRN Insomnia  oxyCODONE    IR 2.5 milliGRAM(s) Oral every 4 hours PRN Moderate Pain (4 - 6)  oxyCODONE    IR 5 milliGRAM(s) Oral every 4 hours PRN Severe Pain (7 - 10)    Allergies    No Known Allergies    Intolerances                    T(C): 36.7 (07-28-22 @ 23:04), Max: 36.7 (07-28-22 @ 23:04)  HR: 114 (07-28-22 @ 23:04) (114 - 114)  BP: 126/87 (07-28-22 @ 23:04) (126/87 - 126/87)  RR: 18 (07-28-22 @ 23:04) (18 - 18)  SpO2: 93% (07-28-22 @ 23:04) (93% - 93%)  Wt(kg): --    PE   RLE:  Skin intact; No ecchymosis/soft tissue swelling  Compartments soft; + TTP about hip. No TTP to knee/leg/ankle/foot   ROM lmited 2/2 pain   Unable to SLR;   Motor intact GS/TA/FHL/EHL  SILT L2-S1  +dp    LLE/BUE:   No bony TTP; Good ROM w/o pain; Exam Unremarkable    Imaging:  XR and CT showing R acetabular lesion    62y Female with R acetabular lesion    - PWB RLE with rolling walker  - Pain control  - FU labs   - FU medicine and heme onc clearance  - Lovenox for dvt ppx  - Plan for OR Wednesday 8/3   (28 Jul 2022 23:54)      PAST MEDICAL & SURGICAL HISTORY:      MEDICATIONS  (STANDING):  famotidine Injectable 20 milliGRAM(s) IV Push two times a day  fentaNYL   Patch  25 MICROgram(s)/Hr 1 Patch Transdermal every 72 hours  lactated ringers. 1000 milliLiter(s) (100 mL/Hr) IV Continuous <Continuous>  metoclopramide Injectable 10 milliGRAM(s) IV Push every 8 hours  prochlorperazine   Injectable 10 milliGRAM(s) IV Push once  saline laxative (FLEET) Rectal Enema 1 Enema Rectal every 24 hours  senna 2 Tablet(s) Oral at bedtime  sodium chloride 0.9%. 1000 milliLiter(s) (100 mL/Hr) IV Continuous <Continuous>      Allergies    No Known Allergies    Intolerances        SOCIAL HISTORY:  Denies ETOh,Smoking,     FAMILY HISTORY:      REVIEW OF SYSTEMS:    CONSTITUTIONAL: No weakness, fevers or chills  EYES/ENT: No visual changes;  No vertigo or throat pain   NECK: No pain or stiffness  RESPIRATORY: No cough, wheezing, hemoptysis; No shortness of breath  CARDIOVASCULAR: No chest pain or palpitations  GASTROINTESTINAL: No abdominal or epigastric pain. No nausea, vomiting, or hematemesis; No diarrhea or constipation. No melena or hematochezia.  GENITOURINARY: No dysuria, frequency or hematuria  NEUROLOGICAL: No numbness or weakness  SKIN: No itching, burning, rashes, or lesions   All other review of systems is negative unless indicated above.    VITAL:  T(C): , Max: 37 (08-01-22 @ 04:51)  T(F): , Max: 98.6 (08-01-22 @ 04:51)  HR: 110 (08-01-22 @ 04:51)  BP: 140/86 (08-01-22 @ 04:51)  BP(mean): --  RR: 18 (08-01-22 @ 04:51)  SpO2: 95% (08-01-22 @ 04:51)  Wt(kg): --    I and O's:    07-30 @ 07:01  -  07-31 @ 07:00  --------------------------------------------------------  IN: 1100 mL / OUT: 300 mL / NET: 800 mL    07-31 @ 07:01  -  08-01 @ 07:00  --------------------------------------------------------  IN: 400 mL / OUT: 2650 mL / NET: -2250 mL          PHYSICAL EXAM:    Constitutional: NAD  HEENT: PERRLA,   Neck: No JVD  Respiratory: CTA B/L  Cardiovascular: S1 and S2  Gastrointestinal: BS+, soft, NT/ND  Extremities: No peripheral edema  Neurological: A/O x 3, no focal deficits  Psychiatric: Normal mood, normal affect  : No Gimenez  Skin: No rashes  Access: Not applicable  Back: No CVA tenderness    LABS:                        10.2   21.52 )-----------( 460      ( 30 Jul 2022 07:29 )             32.4     07-30    136  |  95<L>  |  22  ----------------------------<  115<H>  4.9   |  27  |  0.53    Ca    9.2      30 Jul 2022 07:31    TPro  6.9  /  Alb  2.8<L>  /  TBili  0.3  /  DBili  x   /  AST  44<H>  /  ALT  58<H>  /  AlkPhos  247<H>  07-30          RADIOLOGY & ADDITIONAL STUDIES:

## 2022-08-01 NOTE — PROGRESS NOTE ADULT - PROBLEM SELECTOR PLAN 2
continue fentanyl patch Q72h  Dilaudid q6h for breakthrough  May need to increase the frequency  pain management to see Patient   Patient states she was taking Dilaudid IV 2mg q6 while in [facility] without incident

## 2022-08-01 NOTE — PROGRESS NOTE ADULT - SUBJECTIVE AND OBJECTIVE BOX
Pt seen/examined. Reports RLE pain. Reports nausea overnight. Denies chest pain, SOB or difficulty breathing. Reports having an enema yesterday that improved her symptoms.     T(C): 37 (08-01-22 @ 04:51), Max: 37 (08-01-22 @ 04:51)  HR: 110 (08-01-22 @ 04:51) (100 - 114)  BP: 140/86 (08-01-22 @ 04:51) (124/82 - 140/86)  RR: 18 (08-01-22 @ 04:51) (18 - 18)  SpO2: 95% (08-01-22 @ 04:51) (93% - 98%)  Wt(kg): --    Gen: awake, alert, NAD  Resp: no increased work of breathing  RLE:  skin intact, biopsy site on posterior aspect of buttocks  +EHL/FHL/TA/GS  SILT S/S/SP/DP  +DP Pulse  Compartments soft  No calf TTP                           10.2   21.52 )-----------( 460      ( 30 Jul 2022 07:29 )             32.4       07-30    136  |  95<L>  |  22  ----------------------------<  115<H>  4.9   |  27  |  0.53        PT/INR - ( 30 Jul 2022 07:30 )   PT: 17.5 sec;   INR: 1.50 ratio         PTT - ( 30 Jul 2022 07:30 )  PTT:32.6 sec    A/P: 61yo Female R Hip pain unable to bear weight on RLE and transferred from OSH, found to have R acetabular lesion. VSS. NAD.    Constipation: CT abdomen showing large stool burden, GI team following, Soap Suds enema and lactulose ordered and remaining NPO until bowel  movement.  Appreciate Hem/Onc team following.  Appreciate medicine team following.  PT/OT- Partial weight bearing RLE with Walker.    IS  DVT PPx: lovenox, SCD  Pain Control: follow up chronic pain consult  Plan for OR on 8/3

## 2022-08-01 NOTE — PROGRESS NOTE ADULT - ASSESSMENT
pt with n/v  did not tolerate golte  pt with distended abdomen,  narcoitc induced colonic ileus  bland check mg and phos, but ultimately need to reduce narcocis  understand issue with pain  risk d/w pt and family ? need palliatve.

## 2022-08-02 NOTE — PROGRESS NOTE ADULT - SUBJECTIVE AND OBJECTIVE BOX
Pt seen/examined. Pain controlled. Reports a bowel movement yesterday. Abdominal pain imprved today. No acute overnight complaints or events.    T(C): 36.8 (08-02-22 @ 04:52), Max: 36.9 (08-01-22 @ 09:24)  HR: 105 (08-02-22 @ 04:52) (103 - 109)  BP: 124/86 (08-02-22 @ 04:52) (124/86 - 151/85)  RR: 18 (08-02-22 @ 04:52) (18 - 18)  SpO2: 92% (08-02-22 @ 04:52) (92% - 95%)  Wt(kg): --    Gen: awake, alert, NAD  Resp: no increased work of breathing  RLE:  biopsy site right posterior buttocks  +EHL/FHL/TA/GS  SILT S/S/SP/DP  +DP/PT Pulses  Compartments soft  No calf TTP     A/P: 61yo Female R Hip pain unable to bear weight on RLE and transferred from OSH, found to have R acetabular lesion. VSS. NAD.      Appreciate Hem/Onc team following.  Appreciate medicine team following.  Appreciate GI recs.  PT/OT- Partial weight bearing RLE with Walker.    IS  DVT PPx: lovenox, SCD  Pain Control: follow up chronic pain consult  Plan for OR on 8/3

## 2022-08-02 NOTE — PROGRESS NOTE ADULT - PROBLEM SELECTOR PLAN 2
continue fentanyl patch Q72h  Dilaudid q6h for breakthrough  May need to increase the frequency  pain management to see Patient   Patient states she was taking Dilaudid IV 2mg q6 while in [facility] without incident

## 2022-08-02 NOTE — PROGRESS NOTE ADULT - PROBLEM SELECTOR PLAN 1
Patient scheduled for an Open reduction and internal fixation of the right hip  No contraindication to scheduled procedure  NPO eyal ZHANG  DVT and GI prophylaxis.

## 2022-08-02 NOTE — PROGRESS NOTE ADULT - PROBLEM SELECTOR PLAN 3
Patient has been following up with a oncologist in [place]  States she recently had a bone biopsy but doesn't have the results  will need to eventually start chemo +/- RT.

## 2022-08-02 NOTE — PROGRESS NOTE ADULT - SUBJECTIVE AND OBJECTIVE BOX
62y Female presents as direct transfer from [facility], c/o R hip pain that begain in May, then this past weekend was unable to bear weight and went to nearby hospital, where she was found to have R acetabular lesion, lung lesions and brain lesions per pt. R hip lesion was biopsied at prior hospital and consistent with metastatic lung cancer. Patient denies numbness. Denies trauma. Patient denies headstrike or LOC. Patient denies radiation of pain. Patient denies numbness/tingling/burning in the RLE. No other bone/joint complaints. Patient is a community ambulator at baseline without assistive devices. Patient seen now resting with continue complaint of pain in the right leg. Patient s/p CT scan which shows stool in the colon. Patient with continue complaint of pain at the fracture site. Scheduled for surgery 8/3      MEDICATIONS  (STANDING):  acetaminophen     Tablet .. 975 milliGRAM(s) Oral every 8 hours  fentaNYL   Patch  25 MICROgram(s)/Hr 1 Patch Transdermal every 72 hours  lactated ringers. 1000 milliLiter(s) (100 mL/Hr) IV Continuous <Continuous>  melatonin 5 milliGRAM(s) Oral at bedtime  metoclopramide Injectable 10 milliGRAM(s) IV Push every 8 hours  naloxegol 25 milliGRAM(s) Oral before breakfast  pantoprazole  Injectable 40 milliGRAM(s) IV Push every 12 hours  prochlorperazine   Injectable 10 milliGRAM(s) IV Push once  senna 2 Tablet(s) Oral at bedtime  sodium chloride 0.9%. 1000 milliLiter(s) (100 mL/Hr) IV Continuous <Continuous>    MEDICATIONS  (PRN):  HYDROmorphone  Injectable 1 milliGRAM(s) IV Push every 6 hours PRN Severe Pain (7 - 10) - breakthrough pain  magnesium hydroxide Suspension 30 milliLiter(s) Oral daily PRN Constipation  ondansetron Injectable 4 milliGRAM(s) IV Push every 6 hours PRN Nausea and/or Vomiting  oxyCODONE    IR 5 milliGRAM(s) Oral every 4 hours PRN Moderate Pain (4 - 6)  oxyCODONE    IR 10 milliGRAM(s) Oral every 4 hours PRN Severe Pain (7 - 10)          VITALS:   T(C): 36.8 (08-02-22 @ 13:20), Max: 36.8 (08-01-22 @ 17:20)  HR: 101 (08-02-22 @ 13:20) (100 - 109)  BP: 133/82 (08-02-22 @ 13:20) (124/86 - 138/80)  RR: 18 (08-02-22 @ 13:20) (18 - 18)  SpO2: 95% (08-02-22 @ 13:20) (92% - 95%)  Wt(kg): --    PHYSICAL EXAM:  GENERAL: NAD, well-groomed, well-developed  HEAD:  Atraumatic, Normocephalic  EYES: EOMI, PERRLA, conjunctiva and sclera clear  ENMT: No tonsillar erythema, exudates, or enlargement; Moist mucous membranes, Good dentition, No lesions  NECK: Supple, No JVD, Normal thyroid  NERVOUS SYSTEM:  Alert & Oriented X3, Good concentration;   CHEST/LUNG: Clear to percussion bilaterally; No rales, rhonchi, wheezing, or rubs  HEART: Regular rate and rhythm; No murmurs, rubs, or gallops  ABDOMEN: Soft, Nontender, Nondistended; Bowel sounds present  EXTREMITIES:  2+ Peripheral Pulses, No clubbing, cyanosis, or edema  LYMPH: No lymphadenopathy noted  SKIN: No rashes or lesions    LABS:        CBC Full  -  ( 02 Aug 2022 10:13 )  WBC Count : 9.39 K/uL  RBC Count : 3.30 M/uL  Hemoglobin : 9.4 g/dL  Hematocrit : 29.8 %  Platelet Count - Automated : 517 K/uL  Mean Cell Volume : 90.3 fl  Mean Cell Hemoglobin : 28.5 pg  Mean Cell Hemoglobin Concentration : 31.5 gm/dL  Auto Neutrophil # : x  Auto Lymphocyte # : x  Auto Monocyte # : x  Auto Eosinophil # : x  Auto Basophil # : x  Auto Neutrophil % : x  Auto Lymphocyte % : x  Auto Monocyte % : x  Auto Eosinophil % : x  Auto Basophil % : x    08-02    136  |  95<L>  |  29<H>  ----------------------------<  128<H>  3.8   |  27  |  0.45<L>    Ca    8.8      02 Aug 2022 10:13        PT/INR - ( 02 Aug 2022 10:13 )   PT: 17.5 sec;   INR: 1.52 ratio         PTT - ( 02 Aug 2022 10:13 )  PTT:28.2 sec    CAPILLARY BLOOD GLUCOSE          RADIOLOGY & ADDITIONAL TESTS:

## 2022-08-02 NOTE — CHART NOTE - NSCHARTNOTEFT_GEN_A_CORE
Brief note:   The patient is a 61 yo woman with recently diagnosed lung CA with meds to brain and bone presents with a right pathologic hip fracture scheduled for an Open reduction and internal fixation. GaP team is called for pain management.     d/w NP Enoc that indicated, earlier today, she saw the patient and that she was conformable without any evident signs of opioids adverse effects (no evident lethargy or respiratory depression). In fact she described the patient as seating and having her breakfast. However,     Istop #: [id number]    Others' Prescriptions  Patient Name: Alyssa Diggs Date: 1960  Address: [address] [place] [address] [address] [place], [place] [address]Jsa: Female  Rx Written	Rx Dispensed	Drug	Quantity	Days Supply	Prescriber Name	Prescriber Luz Marina #	Payment Method	Dispenser  07/19/2022	07/21/2022	oxycodone hcl (ir) 10 mg tab	120	20	LanNancie mejía MD	[id number]	[facility] #[id number]  07/07/2022	07/07/2022	oxycodone-acetaminophen 5-325 mg tablet	120	30	Nancie Mora MD	[id number]	[facility] #[id number]  06/28/2022	06/28/2022	tramadol hcl 50 mg tablet	28	7	LanNancie mejía MD	[id number]	[facility] #[id number]  06/26/2022	06/26/2022	oxycodone-acetaminophen 5-325 mg tablet	14	4	Cielo Abbott	[id number]	[facility] #[id number]    MEDICATIONS  (STANDING):  acetaminophen     Tablet .. 975 milliGRAM(s) Oral every 8 hours  fentaNYL   Patch  25 MICROgram(s)/Hr 1 Patch Transdermal every 72 hours  lactated ringers. 1000 milliLiter(s) (100 mL/Hr) IV Continuous <Continuous>  melatonin 5 milliGRAM(s) Oral at bedtime  metoclopramide Injectable 10 milliGRAM(s) IV Push every 8 hours  naloxegol 25 milliGRAM(s) Oral before breakfast  pantoprazole  Injectable 40 milliGRAM(s) IV Push every 12 hours  prochlorperazine   Injectable 10 milliGRAM(s) IV Push once  senna 2 Tablet(s) Oral at bedtime  sodium chloride 0.9%. 1000 milliLiter(s) (100 mL/Hr) IV Continuous <Continuous>    MEDICATIONS  (PRN):  HYDROmorphone  Injectable 1 milliGRAM(s) IV Push every 6 hours PRN Severe Pain (7 - 10) - breakthrough pain  magnesium hydroxide Suspension 30 milliLiter(s) Oral daily PRN Constipation  ondansetron Injectable 4 milliGRAM(s) IV Push every 6 hours PRN Nausea and/or Vomiting  oxyCODONE    IR 5 milliGRAM(s) Oral every 4 hours PRN Moderate Pain (4 - 6)  oxyCODONE    IR 10 milliGRAM(s) Oral every 4 hours PRN Severe Pain (7 - 10)      Vital Signs Last 24 Hrs  T(C): 36.8 (02 Aug 2022 13:20), Max: 36.8 (01 Aug 2022 17:20)  T(F): 98.2 (02 Aug 2022 13:20), Max: 98.3 (01 Aug 2022 23:40)  HR: 108 (02 Aug 2022 15:01) (100 - 109)  BP: 148/84 (02 Aug 2022 15:01) (124/86 - 148/84)  BP(mean): --  RR: 18 (02 Aug 2022 13:20) (18 - 18)  SpO2: 95% (02 Aug 2022 13:20) (92% - 95%)    Parameters below as of 02 Aug 2022 13:20  Patient On (Oxygen Delivery Method): room air    Last BM: 8/1     < from: NM SPECT/CT Bone, Single Area Single Day (08.01.22 @ 17:09) >    IMPRESSION: Abnormal bone scan.    Mildly heterogeneous tracer activity in the right anterior acetabulum   corresponding to sclerotic density on CT. Destructive lesion in the right   posterior acetabulum and right ischium with difficult to delineate soft   tissue component on CT are not visualized on the bone scan.    Mildly heterogeneous tracer activity in the right proximal femur with no   definite abnormality on CT.    --- End of Report ---            GLENDA RUIZ MD; Attending Nuclear Medicine  This document has been electronically signed. Aug  1 2022  5:40PM    < end of copied text >    < from: CT Abdomen and Pelvis No Cont (07.30.22 @ 15:45) >    IMPRESSION:  Large soft tissue mass with gross destruction of the right acetabulum,   and inferior and superior pubic rami highly concerning for malignancy.   The soft tissue mass is inseparable from a small portion the right side   of the bladder and invasion cannot be excluded.. Lytic lesion in L1   vertebral body. Multiple bilateral pleural nodules highly concerning for   metastatic disease. Moderate right pleural effusion with mild posterior   pleural thickening.. Pleural-based metastases cannot be excluded. Right   external iliac lymph node raises concern for metastatic disease.    Gross distention of the ascending colon. There is a large amount of stool   and this may be due to fecal impaction. Patrick syndrome would be   considered if fecal impaction is relieved and dilatationpersisted.   Please correlate clinically.    Mild bilateral hydronephrosis which may be due to the very distended   bladder.        --- End of Report ---            ANNIE MENJIVAR MD; Attending Radiologist  This document has been electronically signed. Jul 30 2022  5:12PM    < end of copied text >    Currently, the patient is on Acetaminophen 975mg q 8ATC, Dilaudid 1mg IV q 6 PRN for servere pain, Oxy IR 5-10mg q 4 PRN moderate to severe pain, ands Fentanyl 25mcg q 72hrs (started on 7/29). Over the last 24 hours, the patient used Dilaudid 1mg IV x 1, Dilaudid 1.5mg IV x 1, Dilaudid 2mg x 1, and Oxy 10mg x 1.     Assessment and Plan:     Cancer pain:   Will recommend:  -If there are not contraindications for steroids, starting Dexa 10mg IV x 1 and then continuing 4mg bid. If starting Dexa continue Protonix   -Increase Fentanyl to 50mcg TD Patch q 72hrs.   -DC Oxy and start Dilaudid 1mg-1.5mg IV q 2 PRN moderate to severe pain  -Naloxone 0.1mg IV q 3' PRN up to three doses for sedation or respiratory depression due to opioids.   -Bowel regimen and antiemetics as per the primary team.         Jackson Reed MD  Associate Chief Geriatrics and Palliative Care (GaP) [facility]   GaP Consult Service   , [employer] at [facility]      Please page the following number for clinical matters between the hours of 9 am and 5 pm from Monday through Friday : [phone]    After 5pm and on weekends, please see the contact information below:    In the event of newly developing, evolving, or worsening symptoms, please contact the Palliative Medicine team via pager (if the patient is at [facility] #[phone] or if the patient is at [facility] #[phone]) The Geriatric and Palliative Medicine service has coverage 24 hours a day/ 7 days a week to provide medical recommendations regarding symptom management needs via telephone Brief note:   The patient is a 61 yo woman with recently diagnosed lung CA with meds to brain and bone presents with a right pathologic hip fracture scheduled for an Open reduction and internal fixation. GaP team is called for pain management.     d/w NP Enoc that indicated, earlier today, she saw the patient and that she was conformable without any evident signs of opioids adverse effects (no evident lethargy or respiratory depression). In fact she described the patient as seating and having her breakfast. However,     Istop #: [id number]    Others' Prescriptions  Patient Name: Alyssa Diggs Date: 1960  Address: [address] [place] [address] [address] [place], [place] [address]Epg: Female  Rx Written	Rx Dispensed	Drug	Quantity	Days Supply	Prescriber Name	Prescriber Luz Marina #	Payment Method	Dispenser  07/19/2022	07/21/2022	oxycodone hcl (ir) 10 mg tab	120	20	LanNancie mejía MD	[id number]	[facility] #[id number]  07/07/2022	07/07/2022	oxycodone-acetaminophen 5-325 mg tablet	120	30	Nancie Mora MD	[id number]	[facility] #[id number]  06/28/2022	06/28/2022	tramadol hcl 50 mg tablet	28	7	LanNancie mejía MD	[id number]	[facility] #[id number]  06/26/2022	06/26/2022	oxycodone-acetaminophen 5-325 mg tablet	14	4	Cielo Abbott	[id number]	[facility] #[id number]    MEDICATIONS  (STANDING):  acetaminophen     Tablet .. 975 milliGRAM(s) Oral every 8 hours  fentaNYL   Patch  25 MICROgram(s)/Hr 1 Patch Transdermal every 72 hours  lactated ringers. 1000 milliLiter(s) (100 mL/Hr) IV Continuous <Continuous>  melatonin 5 milliGRAM(s) Oral at bedtime  metoclopramide Injectable 10 milliGRAM(s) IV Push every 8 hours  naloxegol 25 milliGRAM(s) Oral before breakfast  pantoprazole  Injectable 40 milliGRAM(s) IV Push every 12 hours  prochlorperazine   Injectable 10 milliGRAM(s) IV Push once  senna 2 Tablet(s) Oral at bedtime  sodium chloride 0.9%. 1000 milliLiter(s) (100 mL/Hr) IV Continuous <Continuous>    MEDICATIONS  (PRN):  HYDROmorphone  Injectable 1 milliGRAM(s) IV Push every 6 hours PRN Severe Pain (7 - 10) - breakthrough pain  magnesium hydroxide Suspension 30 milliLiter(s) Oral daily PRN Constipation  ondansetron Injectable 4 milliGRAM(s) IV Push every 6 hours PRN Nausea and/or Vomiting  oxyCODONE    IR 5 milliGRAM(s) Oral every 4 hours PRN Moderate Pain (4 - 6)  oxyCODONE    IR 10 milliGRAM(s) Oral every 4 hours PRN Severe Pain (7 - 10)      Vital Signs Last 24 Hrs  T(C): 36.8 (02 Aug 2022 13:20), Max: 36.8 (01 Aug 2022 17:20)  T(F): 98.2 (02 Aug 2022 13:20), Max: 98.3 (01 Aug 2022 23:40)  HR: 108 (02 Aug 2022 15:01) (100 - 109)  BP: 148/84 (02 Aug 2022 15:01) (124/86 - 148/84)  BP(mean): --  RR: 18 (02 Aug 2022 13:20) (18 - 18)  SpO2: 95% (02 Aug 2022 13:20) (92% - 95%)    Parameters below as of 02 Aug 2022 13:20  Patient On (Oxygen Delivery Method): room air    Last BM: 8/1     < from: NM SPECT/CT Bone, Single Area Single Day (08.01.22 @ 17:09) >    IMPRESSION: Abnormal bone scan.    Mildly heterogeneous tracer activity in the right anterior acetabulum   corresponding to sclerotic density on CT. Destructive lesion in the right   posterior acetabulum and right ischium with difficult to delineate soft   tissue component on CT are not visualized on the bone scan.    Mildly heterogeneous tracer activity in the right proximal femur with no   definite abnormality on CT.    --- End of Report ---            GLENDA RUIZ MD; Attending Nuclear Medicine  This document has been electronically signed. Aug  1 2022  5:40PM    < end of copied text >    < from: CT Abdomen and Pelvis No Cont (07.30.22 @ 15:45) >    IMPRESSION:  Large soft tissue mass with gross destruction of the right acetabulum,   and inferior and superior pubic rami highly concerning for malignancy.   The soft tissue mass is inseparable from a small portion the right side   of the bladder and invasion cannot be excluded.. Lytic lesion in L1   vertebral body. Multiple bilateral pleural nodules highly concerning for   metastatic disease. Moderate right pleural effusion with mild posterior   pleural thickening.. Pleural-based metastases cannot be excluded. Right   external iliac lymph node raises concern for metastatic disease.    Gross distention of the ascending colon. There is a large amount of stool   and this may be due to fecal impaction. Patrick syndrome would be   considered if fecal impaction is relieved and dilatationpersisted.   Please correlate clinically.    Mild bilateral hydronephrosis which may be due to the very distended   bladder.        --- End of Report ---            ANNIE MENJIVAR MD; Attending Radiologist  This document has been electronically signed. Jul 30 2022  5:12PM    < end of copied text >    Currently, the patient is on Acetaminophen 975mg q 8ATC, Dilaudid 1mg IV q 6 PRN for servere pain, Oxy IR 5-10mg q 4 PRN moderate to severe pain, ands Fentanyl 25mcg q 72hrs (started on 7/29). Over the last 24 hours, the patient used Dilaudid 1mg IV x 1, Dilaudid 1.5mg IV x 1, Dilaudid 2mg x 1, and Oxy 10mg x 1.     Assessment and Plan:     Cancer pain:   Will recommend:  -If there are not contraindications for steroids, starting Dexa 10mg IV x 1 and then continuing 4mg bid. If starting Dexa continue Protonix   -Increase Fentanyl to 50mcg TD Patch q 72hrs. Under special instructions, add a note indicated to place the patch over the triceps area. Also DC the 25mcg patch and enter a provider to RN order indicated to remove the 25mcg patch.   -DC Oxy 5 and 10mg and start Dilaudid 1mg-1.5mg IV q 2 PRN moderate to severe pain  -Naloxone 0.1mg IV q 3' PRN up to three doses for sedation or respiratory depression due to opioids.   -Bowel regimen and antiemetics as per the primary team.   -ORIF as per ortho.         Jackson Reed MD  Associate Chief Geriatrics and Palliative Care (GaP) [facility]   GaP Consult Service   , [employer] at [facility]  [facility]      Please page the following number for clinical matters between the hours of 9 am and 5 pm from Monday through Friday : [phone]    After 5pm and on weekends, please see the contact information below:    In the event of newly developing, evolving, or worsening symptoms, please contact the Palliative Medicine team via pager (if the patient is at [facility] #[phone] or if the patient is at [facility] #[phone]) The Geriatric and Palliative Medicine service has coverage 24 hours a day/ 7 days a week to provide medical recommendations regarding symptom management needs via telephone Brief note:   The patient is a 63 yo woman with recently diagnosed lung CA with meds to brain and bone presents with a right pathologic hip fracture scheduled for an Open reduction and internal fixation. GaP team is called for pain management.     d/w NP Enoc that indicated, earlier today, she saw the patient and that she was conformable without any evident signs of opioids adverse effects (no evident lethargy or respiratory depression). In fact she described the patient as seating and having her breakfast. However, in the afternoon, the primary team reached out to the GaP team for re-evaluation due to recurrent symptoms.     Istop #: [id number]    Others' Prescriptions  Patient Name: Alyssa Diggs Date: 1960  Address: [address] [place] [address] [address] [place], [place] [address]Bnu: Female  Rx Written	Rx Dispensed	Drug	Quantity	Days Supply	Prescriber Name	Prescriber Luz Marina #	Payment Method	Dispenser  07/19/2022	07/21/2022	oxycodone hcl (ir) 10 mg tab	120	20	Nancie Mora MD	[id number]	[facility] #[id number]  07/07/2022	07/07/2022	oxycodone-acetaminophen 5-325 mg tablet	120	30	Nancie Mora MD	[id number]	[facility] #[id number]  06/28/2022	06/28/2022	tramadol hcl 50 mg tablet	28	7	Nancie Mora MD	[id number]	[facility] #[id number]  06/26/2022	06/26/2022	oxycodone-acetaminophen 5-325 mg tablet	14	4	Cielo Abbott	[id number]	[facility] #[id number]    MEDICATIONS  (STANDING):  acetaminophen     Tablet .. 975 milliGRAM(s) Oral every 8 hours  fentaNYL   Patch  25 MICROgram(s)/Hr 1 Patch Transdermal every 72 hours  lactated ringers. 1000 milliLiter(s) (100 mL/Hr) IV Continuous <Continuous>  melatonin 5 milliGRAM(s) Oral at bedtime  metoclopramide Injectable 10 milliGRAM(s) IV Push every 8 hours  naloxegol 25 milliGRAM(s) Oral before breakfast  pantoprazole  Injectable 40 milliGRAM(s) IV Push every 12 hours  prochlorperazine   Injectable 10 milliGRAM(s) IV Push once  senna 2 Tablet(s) Oral at bedtime  sodium chloride 0.9%. 1000 milliLiter(s) (100 mL/Hr) IV Continuous <Continuous>    MEDICATIONS  (PRN):  HYDROmorphone  Injectable 1 milliGRAM(s) IV Push every 6 hours PRN Severe Pain (7 - 10) - breakthrough pain  magnesium hydroxide Suspension 30 milliLiter(s) Oral daily PRN Constipation  ondansetron Injectable 4 milliGRAM(s) IV Push every 6 hours PRN Nausea and/or Vomiting  oxyCODONE    IR 5 milliGRAM(s) Oral every 4 hours PRN Moderate Pain (4 - 6)  oxyCODONE    IR 10 milliGRAM(s) Oral every 4 hours PRN Severe Pain (7 - 10)      Vital Signs Last 24 Hrs  T(C): 36.8 (02 Aug 2022 13:20), Max: 36.8 (01 Aug 2022 17:20)  T(F): 98.2 (02 Aug 2022 13:20), Max: 98.3 (01 Aug 2022 23:40)  HR: 108 (02 Aug 2022 15:01) (100 - 109)  BP: 148/84 (02 Aug 2022 15:01) (124/86 - 148/84)  BP(mean): --  RR: 18 (02 Aug 2022 13:20) (18 - 18)  SpO2: 95% (02 Aug 2022 13:20) (92% - 95%)    Parameters below as of 02 Aug 2022 13:20  Patient On (Oxygen Delivery Method): room air    Last BM: 8/1     < from: NM SPECT/CT Bone, Single Area Single Day (08.01.22 @ 17:09) >    IMPRESSION: Abnormal bone scan.    Mildly heterogeneous tracer activity in the right anterior acetabulum   corresponding to sclerotic density on CT. Destructive lesion in the right   posterior acetabulum and right ischium with difficult to delineate soft   tissue component on CT are not visualized on the bone scan.    Mildly heterogeneous tracer activity in the right proximal femur with no   definite abnormality on CT.    --- End of Report ---            GLENDA RUIZ MD; Attending Nuclear Medicine  This document has been electronically signed. Aug  1 2022  5:40PM    < end of copied text >    < from: CT Abdomen and Pelvis No Cont (07.30.22 @ 15:45) >    IMPRESSION:  Large soft tissue mass with gross destruction of the right acetabulum,   and inferior and superior pubic rami highly concerning for malignancy.   The soft tissue mass is inseparable from a small portion the right side   of the bladder and invasion cannot be excluded.. Lytic lesion in L1   vertebral body. Multiple bilateral pleural nodules highly concerning for   metastatic disease. Moderate right pleural effusion with mild posterior   pleural thickening.. Pleural-based metastases cannot be excluded. Right   external iliac lymph node raises concern for metastatic disease.    Gross distention of the ascending colon. There is a large amount of stool   and this may be due to fecal impaction. Patrick syndrome would be   considered if fecal impaction is relieved and dilatationpersisted.   Please correlate clinically.    Mild bilateral hydronephrosis which may be due to the very distended   bladder.        --- End of Report ---            ANNIE MENJIVAR MD; Attending Radiologist  This document has been electronically signed. Jul 30 2022  5:12PM    < end of copied text >    Currently, the patient is on Acetaminophen 975mg q 8ATC, Dilaudid 1mg IV q 6 PRN for servere pain, Oxy IR 5-10mg q 4 PRN moderate to severe pain, ands Fentanyl 25mcg q 72hrs (started on 7/29). Over the last 24 hours, the patient used Dilaudid 1mg IV x 1, Dilaudid 1.5mg IV x 1, Dilaudid 2mg x 1, and Oxy 10mg x 1.     Assessment and Plan:     Cancer pain:   Will recommend:  -If there are not contraindications for steroids, starting Dexa 10mg IV x 1 and then continuing 4mg IV bid. If starting Dexa continue Protonix   -Increase Fentanyl to 50mcg TD Patch q 72hrs. Under special instructions, add a note indicated to place the patch over the triceps area and also to monitor and DC if sedation or respiratory depression. Also DC the 25mcg patch and enter a provider to RN order indicated to remove the 25mcg patch.   -DC Oxy 5 and 10mg and start Dilaudid 1mg-1.5mg IV q 2 PRN moderate to severe pain.,   -Naloxone 0.1mg IV q 3' PRN up to three doses for sedation or respiratory depression due to opioids.   -Bowel regimen and antiemetics as per the primary team.   -ORIF as per ortho.     D/w Dr. Lang that will reach out to JAY Link so there is a f/u on my recommendations.       Jackson Derek, MD  Associate Chief Geriatrics and Palliative Care (GaP) [facility]   GaP Consult Service   , [employer] at [facility]      Please page the following number for clinical matters between the hours of 9 am and 5 pm from Monday through Friday : [phone]    After 5pm and on weekends, please see the contact information below:    In the event of newly developing, evolving, or worsening symptoms, please contact the Palliative Medicine team via pager (if the patient is at [facility] #[phone] or if the patient is at [facility] #[phone]) The Geriatric and Palliative Medicine service has coverage 24 hours a day/ 7 days a week to provide medical recommendations regarding symptom management needs via telephone Brief note:   The patient is a 63 yo woman with recently diagnosed lung CA with meds to brain and bone presents with a right pathologic hip fracture scheduled for an Open reduction and internal fixation. GaP team is called for pain management.     d/w NP Enoc that indicated, earlier today, she saw the patient and that she was conformable without any evident signs of opioids adverse effects (no evident lethargy or respiratory depression). In fact she described the patient as seating and having her breakfast. However, in the afternoon, the primary team reached out to the GaP team for re-evaluation due to recurrent symptoms.     Istop #: [id number]    Others' Prescriptions  Patient Name: Alyssa Diggs Date: 1960  Address: [address] [place] [address] [address] [place], [place] [address]Jou: Female  Rx Written	Rx Dispensed	Drug	Quantity	Days Supply	Prescriber Name	Prescriber Luz Marina #	Payment Method	Dispenser  07/19/2022	07/21/2022	oxycodone hcl (ir) 10 mg tab	120	20	Nancie Mora MD	[id number]	[facility] #[id number]  07/07/2022	07/07/2022	oxycodone-acetaminophen 5-325 mg tablet	120	30	Nancie Mora MD	[id number]	[facility] #[id number]  06/28/2022	06/28/2022	tramadol hcl 50 mg tablet	28	7	Nancie Mora MD	[id number]	[facility] #[id number]  06/26/2022	06/26/2022	oxycodone-acetaminophen 5-325 mg tablet	14	4	Cielo Abbott	[id number]	[facility] #[id number]    MEDICATIONS  (STANDING):  acetaminophen     Tablet .. 975 milliGRAM(s) Oral every 8 hours  fentaNYL   Patch  25 MICROgram(s)/Hr 1 Patch Transdermal every 72 hours  lactated ringers. 1000 milliLiter(s) (100 mL/Hr) IV Continuous <Continuous>  melatonin 5 milliGRAM(s) Oral at bedtime  metoclopramide Injectable 10 milliGRAM(s) IV Push every 8 hours  naloxegol 25 milliGRAM(s) Oral before breakfast  pantoprazole  Injectable 40 milliGRAM(s) IV Push every 12 hours  prochlorperazine   Injectable 10 milliGRAM(s) IV Push once  senna 2 Tablet(s) Oral at bedtime  sodium chloride 0.9%. 1000 milliLiter(s) (100 mL/Hr) IV Continuous <Continuous>    MEDICATIONS  (PRN):  HYDROmorphone  Injectable 1 milliGRAM(s) IV Push every 6 hours PRN Severe Pain (7 - 10) - breakthrough pain  magnesium hydroxide Suspension 30 milliLiter(s) Oral daily PRN Constipation  ondansetron Injectable 4 milliGRAM(s) IV Push every 6 hours PRN Nausea and/or Vomiting  oxyCODONE    IR 5 milliGRAM(s) Oral every 4 hours PRN Moderate Pain (4 - 6)  oxyCODONE    IR 10 milliGRAM(s) Oral every 4 hours PRN Severe Pain (7 - 10)      Vital Signs Last 24 Hrs  T(C): 36.8 (02 Aug 2022 13:20), Max: 36.8 (01 Aug 2022 17:20)  T(F): 98.2 (02 Aug 2022 13:20), Max: 98.3 (01 Aug 2022 23:40)  HR: 108 (02 Aug 2022 15:01) (100 - 109)  BP: 148/84 (02 Aug 2022 15:01) (124/86 - 148/84)  BP(mean): --  RR: 18 (02 Aug 2022 13:20) (18 - 18)  SpO2: 95% (02 Aug 2022 13:20) (92% - 95%)    Parameters below as of 02 Aug 2022 13:20  Patient On (Oxygen Delivery Method): room air    Last BM: 8/1     < from: NM SPECT/CT Bone, Single Area Single Day (08.01.22 @ 17:09) >    IMPRESSION: Abnormal bone scan.    Mildly heterogeneous tracer activity in the right anterior acetabulum   corresponding to sclerotic density on CT. Destructive lesion in the right   posterior acetabulum and right ischium with difficult to delineate soft   tissue component on CT are not visualized on the bone scan.    Mildly heterogeneous tracer activity in the right proximal femur with no   definite abnormality on CT.    --- End of Report ---            GLENDA RUIZ MD; Attending Nuclear Medicine  This document has been electronically signed. Aug  1 2022  5:40PM    < end of copied text >    < from: CT Abdomen and Pelvis No Cont (07.30.22 @ 15:45) >    IMPRESSION:  Large soft tissue mass with gross destruction of the right acetabulum,   and inferior and superior pubic rami highly concerning for malignancy.   The soft tissue mass is inseparable from a small portion the right side   of the bladder and invasion cannot be excluded.. Lytic lesion in L1   vertebral body. Multiple bilateral pleural nodules highly concerning for   metastatic disease. Moderate right pleural effusion with mild posterior   pleural thickening.. Pleural-based metastases cannot be excluded. Right   external iliac lymph node raises concern for metastatic disease.    Gross distention of the ascending colon. There is a large amount of stool   and this may be due to fecal impaction. Patrick syndrome would be   considered if fecal impaction is relieved and dilatationpersisted.   Please correlate clinically.    Mild bilateral hydronephrosis which may be due to the very distended   bladder.        --- End of Report ---            ANNIE MENJIVAR MD; Attending Radiologist  This document has been electronically signed. Jul 30 2022  5:12PM    < end of copied text >    Currently, the patient is on Acetaminophen 975mg q 8ATC, Dilaudid 1mg IV q 6 PRN for servere pain, Oxy IR 5-10mg q 4 PRN moderate to severe pain, ands Fentanyl 25mcg q 72hrs (started on 7/29). Over the last 24 hours, the patient used Dilaudid 1mg IV x 1, Dilaudid 1.5mg IV x 1, Dilaudid 2mg x 1, and Oxy 10mg x 1.     Assessment and Plan:     Cancer pain:   Will recommend:  -If there are not contraindications for steroids, starting Dexa 10mg IV x 1 and then continuing 4mg IV bid. If starting Dexa continue Protonix   -Increase Fentanyl to 50mcg TD Patch q 72hrs. Under special instructions, add a note indicated to place the patch over the triceps area and also to monitor and DC if sedation or respiratory depression. Also DC the 25mcg patch and enter a provider to RN order indicated to remove the 25mcg patch.   -DC Oxy 5 and 10mg and start Dilaudid 1mg-1.5mg IV q 2 PRN moderate to severe pain.,   -Naloxone 0.1mg IV q 3' PRN up to three doses for sedation or respiratory depression due to opioids.   -Bowel regimen and antiemetics as per the primary team.   -ORIF as per ortho.     D/w Dr. Lang that will reach out to JAY Link so there is a f/u on my recommendations.     26' were spent in non face to face time during this encounter Time in 16:00 time out 16:26    Jackson Reed MD  Associate Chief Geriatrics and Palliative Care ([facility]   [facility]   , [employer] [facility]      Please page the following number for clinical matters between the hours of 9 am and 5 pm from Monday through Friday : [phone]    After 5pm and on weekends, please see the contact information below:    In the event of newly developing, evolving, or worsening symptoms, please contact the Palliative Medicine team via pager (if the patient is at [facility] #[phone] or if the patient is at [facility] #[phone]) The Geriatric and Palliative Medicine service has coverage 24 hours a day/ 7 days a week to provide medical recommendations regarding symptom management needs via telephone

## 2022-08-02 NOTE — PROGRESS NOTE ADULT - SUBJECTIVE AND OBJECTIVE BOX
Patient is a 62y Female     Patient is a 62y old  Female who presents with a chief complaint of abd pain, n/v (01 Aug 2022 07:17)      HPI:  62y Female presents as direct transfer from [facility], c/o R hip pain that begain in May, then this past weekend was unable to bear weight and went to nearby hospital, where she was found to have R acetabular lesion, lung lesions and brain lesions per pt. R hip lesion was biopsied at prior hospital and consistent with metastatic lung cancer. Patient denies numbness. Denies trauma. Patient denies headstrike or LOC. Patient denies radiation of pain. Patient denies numbness/tingling/burning in the RLE. No other bone/joint complaints. Patient is a community ambulator at baseline without assistive devices.    PAST MEDICAL & SURGICAL HISTORY:    MEDICATIONS  (STANDING):  acetaminophen     Tablet .. 975 milliGRAM(s) Oral every 8 hours  enoxaparin Injectable 40 milliGRAM(s) SubCutaneous every 24 hours  senna 2 Tablet(s) Oral at bedtime  sodium chloride 0.9%. 1000 milliLiter(s) (75 mL/Hr) IV Continuous <Continuous>    MEDICATIONS  (PRN):  HYDROmorphone  Injectable 0.5 milliGRAM(s) IV Push every 6 hours PRN Severe Pain (7 - 10)  magnesium hydroxide Suspension 30 milliLiter(s) Oral daily PRN Constipation  melatonin 3 milliGRAM(s) Oral at bedtime PRN Insomnia  oxyCODONE    IR 2.5 milliGRAM(s) Oral every 4 hours PRN Moderate Pain (4 - 6)  oxyCODONE    IR 5 milliGRAM(s) Oral every 4 hours PRN Severe Pain (7 - 10)    Allergies    No Known Allergies    Intolerances                    T(C): 36.7 (07-28-22 @ 23:04), Max: 36.7 (07-28-22 @ 23:04)  HR: 114 (07-28-22 @ 23:04) (114 - 114)  BP: 126/87 (07-28-22 @ 23:04) (126/87 - 126/87)  RR: 18 (07-28-22 @ 23:04) (18 - 18)  SpO2: 93% (07-28-22 @ 23:04) (93% - 93%)  Wt(kg): --    PE   RLE:  Skin intact; No ecchymosis/soft tissue swelling  Compartments soft; + TTP about hip. No TTP to knee/leg/ankle/foot   ROM lmited 2/2 pain   Unable to SLR;   Motor intact GS/TA/FHL/EHL  SILT L2-S1  +dp    LLE/BUE:   No bony TTP; Good ROM w/o pain; Exam Unremarkable    Imaging:  XR and CT showing R acetabular lesion    62y Female with R acetabular lesion    - PWB RLE with rolling walker  - Pain control  - FU labs   - FU medicine and heme onc clearance  - Lovenox for dvt ppx  - Plan for OR Wednesday 8/3   (28 Jul 2022 23:54)      PAST MEDICAL & SURGICAL HISTORY:      MEDICATIONS  (STANDING):  fentaNYL   Patch  25 MICROgram(s)/Hr 1 Patch Transdermal every 72 hours  lactated ringers. 1000 milliLiter(s) (100 mL/Hr) IV Continuous <Continuous>  melatonin 5 milliGRAM(s) Oral at bedtime  metoclopramide Injectable 10 milliGRAM(s) IV Push every 8 hours  naloxegol 25 milliGRAM(s) Oral before breakfast  pantoprazole  Injectable 40 milliGRAM(s) IV Push every 12 hours  prochlorperazine   Injectable 10 milliGRAM(s) IV Push once  senna 2 Tablet(s) Oral at bedtime  sodium chloride 0.9%. 1000 milliLiter(s) (100 mL/Hr) IV Continuous <Continuous>      Allergies    No Known Allergies    Intolerances        SOCIAL HISTORY:  Denies ETOh,Smoking,     FAMILY HISTORY:      REVIEW OF SYSTEMS:    CONSTITUTIONAL: No weakness, fevers or chills  EYES/ENT: No visual changes;  No vertigo or throat pain   NECK: No pain or stiffness  RESPIRATORY: No cough, wheezing, hemoptysis; No shortness of breath  CARDIOVASCULAR: No chest pain or palpitations  GASTROINTESTINAL: No abdominal or epigastric pain. No nausea, vomiting, or hematemesis; No diarrhea or constipation. No melena or hematochezia.  GENITOURINARY: No dysuria, frequency or hematuria  NEUROLOGICAL: No numbness or weakness  SKIN: No itching, burning, rashes, or lesions   All other review of systems is negative unless indicated above.    VITAL:  T(C): , Max: 36.9 (08-01-22 @ 09:24)  T(F): , Max: 98.5 (08-01-22 @ 09:24)  HR: 105 (08-02-22 @ 04:52)  BP: 124/86 (08-02-22 @ 04:52)  BP(mean): --  RR: 18 (08-02-22 @ 04:52)  SpO2: 92% (08-02-22 @ 04:52)  Wt(kg): --    I and O's:    07-30 @ 07:01  -  07-31 @ 07:00  --------------------------------------------------------  IN: 1100 mL / OUT: 300 mL / NET: 800 mL    07-31 @ 07:01  -  08-01 @ 07:00  --------------------------------------------------------  IN: 400 mL / OUT: 2650 mL / NET: -2250 mL    08-01 @ 07:01  -  08-02 @ 06:52  --------------------------------------------------------  IN: 700 mL / OUT: 800 mL / NET: -100 mL          PHYSICAL EXAM:    Constitutional: NAD  HEENT: PERRLA,   Neck: No JVD  Respiratory: CTA B/L  Cardiovascular: S1 and S2  Gastrointestinal: BS+, soft, NT/ND  Extremities: No peripheral edema  Neurological: A/O x 3, no focal deficits  Psychiatric: Normal mood, normal affect  : No Gimenez  Skin: No rashes  Access: Not applicable  Back: No CVA tenderness    LABS:                RADIOLOGY & ADDITIONAL STUDIES:

## 2022-08-03 NOTE — PRE-OP CHECKLIST - SPO2 (%)
Gastroenterology  Patient seen and examined bedside resting comfortably.  No complaints offered.   No abdominal pain  Denies nausea and vomiting. Tolerating diet.  +/BM.     T(F): 96.8 (03-21-19 @ 05:19), Max: 98.2 (03-20-19 @ 11:24)  HR: 70 (03-21-19 @ 05:19) (70 - 78)  BP: 115/69 (03-21-19 @ 05:19) (112/62 - 123/88)  RR: 17 (03-21-19 @ 05:19) (17 - 18)  SpO2: 98% (03-21-19 @ 05:19) (97% - 98%)  Wt(kg): --  CAPILLARY BLOOD GLUCOSE          PHYSICAL EXAM:  General: NAD, WDWN.   Neuro:  Alert & responsive  HEENT: NCAT, EOMI, conjunctiva clear  CV: +S1+S2 regular rate and rhythm  Lung: clear to ausculation bilaterally, respirations nonlabored, good inspiratory effort  Abdomen: soft, Non Tender, No distention Normal active BS  Extremities: no cyanosis, clubbing or edema    LABS:              I&O's Detail Gastroenterology  Patient seen and examined bedside resting comfortably.   Abdominal pain is well controlled.  Denies nausea and vomiting. Tolerating diet.  Normal flatus + BM today     T(F): 98.2 (03-20-19 @ 11:24), Max: 98.2 (03-19-19 @ 17:11)  HR: 78 (03-20-19 @ 11:24) (68 - 78)  BP: 123/75 (03-20-19 @ 11:24) (109/80 - 123/75)  RR: 18 (03-20-19 @ 11:24) (18 - 18)  SpO2: 98% (03-20-19 @ 11:24) (96% - 98%)  Wt(kg): --  CAPILLARY BLOOD GLUCOSE          PHYSICAL EXAM:  General: NAD, WDWN.   Neuro:  Alert & responsive  HEENT: NCAT, EOMI, conjunctiva clear  CV: +S1+S2 regular rate and rhythm  Lung: clear to ausculation bilaterally, respirations nonlabored, good inspiratory effort  Abdomen: soft, Non tender, No Distension. Normal active BS  Extremities: no pedal edema or calf tenderness noted     LABS:    I&O's Detail    19 Mar 2019 07:01  -  20 Mar 2019 07:00  --------------------------------------------------------  IN:    dextrose 5% + sodium chloride 0.45%.: 960 mL  Total IN: 960 mL    OUT:    Voided: 2700 mL  Total OUT: 2700 mL    Total NET: -1740 mL Gastroenterology  Patient seen and examined bedside resting comfortably.  Complains of odynophagia and epigastric Abdominal pain   Denies nausea and vomiting. Tolerating small amounts of reg diet.  Normal flatus, claims no bm in 7 days    T(F): 98.1 (03-19-19 @ 11:20), Max: 98.1 (03-18-19 @ 18:06)  HR: 57 (03-19-19 @ 11:20) (57 - 74)  BP: 134/93 (03-19-19 @ 11:20) (112/73 - 134/93)  RR: 18 (03-19-19 @ 11:20) (16 - 18)  SpO2: 99% (03-19-19 @ 11:20) (96% - 99%)  Wt(kg): --  CAPILLARY BLOOD GLUCOSE          PHYSICAL EXAM:  General: NAD, WDWN.   Neuro:  Alert & responsive  HEENT: NCAT, EOMI, conjunctiva clear  CV: +S1+S2 regular rate and rhythm  Lung: clear to ausculation bilaterally, respirations nonlabored, good inspiratory effort  Abdomen: soft, Non tender, No Distension. Normal active BS  Extremities: no pedal edema or calf tenderness noted     LABS:                        12.3   9.53  )-----------( 204      ( 18 Mar 2019 06:25 )             37.5     03-18    141  |  109<H>  |  6<L>  ----------------------------<  122<H>  4.0   |  28  |  0.83    Ca    8.3<L>      18 Mar 2019 06:25          I&O's Detail    18 Mar 2019 07:01  -  19 Mar 2019 07:00  --------------------------------------------------------  IN:    dextrose 5% + sodium chloride 0.45%.: 960 mL    Oral Fluid: 240 mL  Total IN: 1200 mL    OUT:  Total OUT: 0 mL    Total NET: 1200 mL Patient is a 50y old  Male who presents with a chief complaint of Abdominal pain. (16 Mar 2019 15:56)      SUBJECTIVE/OBJECTIVE:    c/o nausea     MEDICATIONS  (STANDING):  dextrose 5% + sodium chloride 0.45%. 1000 milliLiter(s) (80 mL/Hr) IV Continuous <Continuous>  dicyclomine 10 milliGRAM(s) Oral two times a day before meals  enoxaparin Injectable 40 milliGRAM(s) SubCutaneous every 24 hours  methylPREDNISolone sodium succinate Injectable 40 milliGRAM(s) IV Push every 8 hours  multivitamin 1 Tablet(s) Oral daily  nicotine -  14 mG/24Hr(s) Patch 1 patch Transdermal daily  pantoprazole  Injectable 40 milliGRAM(s) IV Push daily    MEDICATIONS  (PRN):  morphine  - Injectable 2 milliGRAM(s) IV Push every 6 hours PRN Moderate Pain (4 - 6)  ondansetron Injectable 4 milliGRAM(s) IV Push every 6 hours PRN Nausea and/or Vomiting      Allergies    No Known Allergies    Intolerances          Vital Signs Last 24 Hrs  T(C): 36.3 (17 Mar 2019 05:18), Max: 36.8 (16 Mar 2019 13:02)  T(F): 97.4 (17 Mar 2019 05:18), Max: 98.3 (16 Mar 2019 13:02)  HR: 64 (17 Mar 2019 08:52) (54 - 80)  BP: 104/66 (17 Mar 2019 08:52) (92/55 - 138/80)  BP(mean): --  RR: 18 (17 Mar 2019 07:41) (16 - 18)  SpO2: 98% (17 Mar 2019 07:41) (92% - 100%)    PHYSICAL EXAM:  GENERAL: NAD, thin  HEAD:  Atraumatic, Normocephalic  EYES: EOMI, PERRLA, conjunctiva and sclera clear  ENMT: No tonsillar erythema, exudates, or enlargement; Moist mucous membranes, No lesions  NECK: Supple, No JVD, Normal thyroid  NERVOUS SYSTEM:  Alert & Oriented X3; Motor Strength 5/5 B/L upper and lower extremities; DTRs 2+ intact and symmetric  CHEST/LUNG: Clear bilaterally; No rales, rhonchi, wheezing, or rubs  HEART: Regular rate and rhythm; No murmurs, rubs, or gallops  ABDOMEN: Soft,   Diffuse tenderness, Nondistended; Bowel sounds present  EXTREMITIES:  2+ Peripheral Pulses, No clubbing, cyanosis, or edema  LYMPH: No lymphadenopathy noted  SKIN: No rashes or lesions    LABS:                        12.8   7.76  )-----------( 216      ( 17 Mar 2019 07:23 )             38.1     03-17    138  |  106  |  8   ----------------------------<  122<H>  3.9   |  26  |  0.74    Ca    8.2<L>      17 Mar 2019 07:23    TPro  8.2  /  Alb  4.1  /  TBili  1.9<H>  /  DBili  x   /  AST  141<H>  /  ALT  75  /  AlkPhos  92  03-16        CAPILLARY BLOOD GLUCOSE              RADIOLOGY & ADDITIONAL TESTS:        Consultant(s) Notes Reviewed:  [ ] YES  [ ] NO Patient is a 50y old  Male who presents with a chief complaint of Abdominal pain. (17 Mar 2019 12:05)      SUBJECTIVE/OBJECTIVE:    Without complaints. Patient resting comfortably.   No n/v    MEDICATIONS  (STANDING):  dextrose 5% + sodium chloride 0.45%. 1000 milliLiter(s) (80 mL/Hr) IV Continuous <Continuous>  dicyclomine 10 milliGRAM(s) Oral two times a day before meals  enoxaparin Injectable 40 milliGRAM(s) SubCutaneous every 24 hours  methylPREDNISolone sodium succinate Injectable 40 milliGRAM(s) IV Push daily  multivitamin 1 Tablet(s) Oral daily  nicotine -  14 mG/24Hr(s) Patch 1 patch Transdermal daily  pantoprazole  Injectable 40 milliGRAM(s) IV Push daily    MEDICATIONS  (PRN):  morphine  - Injectable 2 milliGRAM(s) IV Push every 6 hours PRN Moderate Pain (4 - 6)  ondansetron Injectable 4 milliGRAM(s) IV Push every 6 hours PRN Nausea and/or Vomiting      Allergies    No Known Allergies    Intolerances          Vital Signs Last 24 Hrs  T(C): 36.1 (18 Mar 2019 05:58), Max: 36.6 (17 Mar 2019 17:13)  T(F): 97 (18 Mar 2019 05:58), Max: 97.9 (17 Mar 2019 23:34)  HR: 66 (18 Mar 2019 05:58) (64 - 94)  BP: 118/79 (18 Mar 2019 05:58) (106/64 - 123/70)  BP(mean): --  RR: 18 (18 Mar 2019 05:58) (18 - 18)  SpO2: 100% (18 Mar 2019 05:58) (93% - 100%)    PHYSICAL EXAM:  GENERAL: NAD, well-groomed, well-developed  HEAD:  Atraumatic, Normocephalic  EYES: EOMI, PERRLA, conjunctiva and sclera clear  ENMT: No tonsillar erythema, exudates, or enlargement; Moist mucous membranes, No lesions  NECK: Supple, No JVD, Normal thyroid  NERVOUS SYSTEM:  Alert & Oriented X3; Motor Strength 5/5 B/L upper and lower extremities; DTRs 2+ intact and symmetric  CHEST/LUNG: Clear bilaterally; No rales, rhonchi, wheezing, or rubs  HEART: Regular rate and rhythm; No murmurs, rubs, or gallops  ABDOMEN: Soft, Nontender, Nondistended; Bowel sounds present  EXTREMITIES:  2+ Peripheral Pulses, No clubbing, cyanosis, or edema  LYMPH: No lymphadenopathy noted  SKIN: No rashes or lesions    LABS:                        12.3   9.53  )-----------( 204      ( 18 Mar 2019 06:25 )             37.5     03-18    141  |  109<H>  |  6<L>  ----------------------------<  122<H>  4.0   |  28  |  0.83    Ca    8.3<L>      18 Mar 2019 06:25          CAPILLARY BLOOD GLUCOSE              RADIOLOGY & ADDITIONAL TESTS:        Consultant(s) Notes Reviewed:  [ ] YES  [ ] NO Patient is a 50y old  Male who presents with a chief complaint of Abdominal pain. (19 Mar 2019 13:00)      SUBJECTIVE/OBJECTIVE:    Without complaints.    MEDICATIONS  (STANDING):  dextrose 5% + sodium chloride 0.45%. 1000 milliLiter(s) (80 mL/Hr) IV Continuous <Continuous>  dicyclomine 10 milliGRAM(s) Oral two times a day before meals  docusate sodium 100 milliGRAM(s) Oral two times a day  enoxaparin Injectable 40 milliGRAM(s) SubCutaneous every 24 hours  FIRST- Mouthwash  BLM 5 milliLiter(s) Swish and Swallow two times a day  methylPREDNISolone sodium succinate Injectable 40 milliGRAM(s) IV Push daily  multivitamin 1 Tablet(s) Oral daily  nicotine -  14 mG/24Hr(s) Patch 1 patch Transdermal daily  pantoprazole  Injectable 40 milliGRAM(s) IV Push daily    MEDICATIONS  (PRN):  bisacodyl 5 milliGRAM(s) Oral every 12 hours PRN Constipation  morphine  - Injectable 2 milliGRAM(s) IV Push every 6 hours PRN Moderate Pain (4 - 6)  ondansetron Injectable 4 milliGRAM(s) IV Push every 6 hours PRN Nausea and/or Vomiting      Allergies    No Known Allergies    Intolerances          Vital Signs Last 24 Hrs  T(C): 36 (20 Mar 2019 05:49), Max: 36.8 (19 Mar 2019 17:11)  T(F): 96.8 (20 Mar 2019 05:49), Max: 98.2 (19 Mar 2019 17:11)  HR: 68 (20 Mar 2019 05:49) (57 - 74)  BP: 119/68 (20 Mar 2019 05:49) (109/80 - 134/93)  BP(mean): --  RR: 18 (20 Mar 2019 05:49) (18 - 18)  SpO2: 98% (20 Mar 2019 05:49) (96% - 99%)    PHYSICAL EXAM:  GENERAL: NAD, well-groomed, well-developed  HEAD:  Atraumatic, Normocephalic  EYES: EOMI, PERRLA, conjunctiva and sclera clear  ENMT: No tonsillar erythema, exudates, or enlargement; Moist mucous membranes, No lesions  NECK: Supple, No JVD, Normal thyroid  NERVOUS SYSTEM:  Alert & Oriented X3; Motor Strength 5/5 B/L upper and lower extremities; DTRs 2+ intact and symmetric  CHEST/LUNG: Clear bilaterally; No rales, rhonchi, wheezing, or rubs  HEART: Regular rate and rhythm; No murmurs, rubs, or gallops  ABDOMEN: Soft, Nontender, Nondistended; Bowel sounds present  EXTREMITIES:  2+ Peripheral Pulses, No clubbing, cyanosis, or edema  LYMPH: No lymphadenopathy noted  SKIN: No rashes or lesions    LABS:              CAPILLARY BLOOD GLUCOSE              RADIOLOGY & ADDITIONAL TESTS:        Consultant(s) Notes Reviewed:  [xxx ] YES  [ ] NO 96

## 2022-08-03 NOTE — PROGRESS NOTE ADULT - SUBJECTIVE AND OBJECTIVE BOX
MEDICATIONS  (STANDING):    MEDICATIONS  (PRN):          VITALS:   T(C): 36.9 (08-03-22 @ 12:16), Max: 36.9 (08-02-22 @ 16:32)  HR: 101 (08-03-22 @ 12:16) (82 - 104)  BP: 127/78 (08-03-22 @ 12:16) (124/67 - 145/82)  RR: 17 (08-03-22 @ 12:16) (17 - 18)  SpO2: 96% (08-03-22 @ 12:16) (94% - 96%)  Wt(kg): --        LABS:  ABG - ( 03 Aug 2022 15:40 )  pH, Arterial: 7.31  pH, Blood: x     /  pCO2: 42    /  pO2: 198   / HCO3: 21    / Base Excess: -4.9  /  SaO2: 100.0                 CBC Full  -  ( 03 Aug 2022 04:58 )  WBC Count : 11.31 K/uL  RBC Count : 3.05 M/uL  Hemoglobin : 8.7 g/dL  Hematocrit : 27.0 %  Platelet Count - Automated : 519 K/uL  Mean Cell Volume : 88.5 fl  Mean Cell Hemoglobin : 28.5 pg  Mean Cell Hemoglobin Concentration : 32.2 gm/dL  Auto Neutrophil # : x  Auto Lymphocyte # : x  Auto Monocyte # : x  Auto Eosinophil # : x  Auto Basophil # : x  Auto Neutrophil % : x  Auto Lymphocyte % : x  Auto Monocyte % : x  Auto Eosinophil % : x  Auto Basophil % : x    08-03    134<L>  |  96  |  22  ----------------------------<  141<H>  4.0   |  25  |  0.38<L>    Ca    8.2<L>      03 Aug 2022 04:58        PT/INR - ( 03 Aug 2022 04:58 )   PT: 17.8 sec;   INR: 1.53 ratio         PTT - ( 03 Aug 2022 04:58 )  PTT:26.3 sec    CAPILLARY BLOOD GLUCOSE          RADIOLOGY & ADDITIONAL TESTS:       62y Female presents as direct transfer from [facility], c/o R hip pain that begain in May, then this past weekend was unable to bear weight and went to nearby hospital, where she was found to have R acetabular lesion, lung lesions and brain lesions per pt. R hip lesion was biopsied at prior hospital and consistent with metastatic lung cancer. Patient denies numbness. Denies trauma. Patient denies headstrike or LOC. Patient denies radiation of pain. Patient denies numbness/tingling/burning in the RLE. No other bone/joint complaints. Patient is a community ambulator at baseline without assistive devices. Patient seen now resting with continue complaint of pain in the right leg. Patient s/p CT scan which shows stool in the colon. Patient with continue complaint of pain at the fracture site. Patient seen Post op. tolerated the procedure well.       MEDICATIONS  (STANDING):    MEDICATIONS  (PRN):          VITALS:   T(C): 36.9 (08-03-22 @ 12:16), Max: 36.9 (08-02-22 @ 16:32)  HR: 101 (08-03-22 @ 12:16) (82 - 104)  BP: 127/78 (08-03-22 @ 12:16) (124/67 - 145/82)  RR: 17 (08-03-22 @ 12:16) (17 - 18)  SpO2: 96% (08-03-22 @ 12:16) (94% - 96%)  Wt(kg): --    PHYSICAL EXAM:  GENERAL: NAD, well-groomed, well-developed  HEAD:  Atraumatic, Normocephalic  EYES: EOMI, PERRLA, conjunctiva and sclera clear  ENMT: No tonsillar erythema, exudates, or enlargement; Moist mucous membranes, Good dentition, No lesions  NECK: Supple, No JVD, Normal thyroid  NERVOUS SYSTEM:  Alert & Oriented X3, Good concentration;   CHEST/LUNG: Clear to percussion bilaterally; No rales, rhonchi, wheezing, or rubs  HEART: Regular rate and rhythm; No murmurs, rubs, or gallops  ABDOMEN: Soft, Nontender, Nondistended; Bowel sounds present  EXTREMITIES:  2+ Peripheral Pulses, No clubbing, cyanosis, or edema  LYMPH: No lymphadenopathy noted  SKIN: No rashes or lesions    LABS:  ABG - ( 03 Aug 2022 15:40 )  pH, Arterial: 7.31  pH, Blood: x     /  pCO2: 42    /  pO2: 198   / HCO3: 21    / Base Excess: -4.9  /  SaO2: 100.0                 CBC Full  -  ( 03 Aug 2022 04:58 )  WBC Count : 11.31 K/uL  RBC Count : 3.05 M/uL  Hemoglobin : 8.7 g/dL  Hematocrit : 27.0 %  Platelet Count - Automated : 519 K/uL  Mean Cell Volume : 88.5 fl  Mean Cell Hemoglobin : 28.5 pg  Mean Cell Hemoglobin Concentration : 32.2 gm/dL  Auto Neutrophil # : x  Auto Lymphocyte # : x  Auto Monocyte # : x  Auto Eosinophil # : x  Auto Basophil # : x  Auto Neutrophil % : x  Auto Lymphocyte % : x  Auto Monocyte % : x  Auto Eosinophil % : x  Auto Basophil % : x    08-03    134<L>  |  96  |  22  ----------------------------<  141<H>  4.0   |  25  |  0.38<L>    Ca    8.2<L>      03 Aug 2022 04:58        PT/INR - ( 03 Aug 2022 04:58 )   PT: 17.8 sec;   INR: 1.53 ratio         PTT - ( 03 Aug 2022 04:58 )  PTT:26.3 sec    CAPILLARY BLOOD GLUCOSE          RADIOLOGY & ADDITIONAL TESTS:

## 2022-08-03 NOTE — PRE-ANESTHESIA EVALUATION ADULT - NSANTHOSAYNRD_GEN_A_CORE
No. HALEIGH screening performed.  STOP BANG Legend: 0-2 = LOW Risk; 3-4 = INTERMEDIATE Risk; 5-8 = HIGH Risk

## 2022-08-03 NOTE — PROGRESS NOTE ADULT - PROBLEM SELECTOR PLAN 2
continue fentanyl patch Q72h  Dilaudid q6h for breakthrough  May need to increase the frequency  pain management to see Patient   Patient states she was taking Dilaudid IV 2mg q6 while in [facility] without incident

## 2022-08-03 NOTE — CONSULT NOTE ADULT - PROBLEM SELECTOR RECOMMENDATION 9
- pain control per primary team  - please offer good bowel regimen for her constipation especially while patient is on opioid pain meds
pathologic fracture  plan for OR today
Patient scheduled for an Open reduction and internal fixation of the right hip  No contraindication to scheduled procedure  NPO the evening prior to surgery  DVT and GI prophylaxis

## 2022-08-03 NOTE — CONSULT NOTE ADULT - PROBLEM SELECTOR RECOMMENDATION 3
Patient has been following up with a oncologist in [place]  States she recently had a bone biopsy but doesn't have the results  will need to eventually start chemo +/- RT
pt reports plan to fix hip and f/u with onc about treatement plan for management of lung ca  f/u with onc as out pt

## 2022-08-03 NOTE — CONSULT NOTE ADULT - SUBJECTIVE AND OBJECTIVE BOX
HPI:  62y Female presents as direct transfer from [facility], c/o R hip pain that started in May, the  weekend prior to admission was unable to bear weight and went to nearby hospital, where she was found to have R acetabular lesion, lung lesions and brain lesions per pt. R hip lesion was biopsied at prior hospital and consistent with metastatic lung cancer.     PAST MEDICAL & SURGICAL HISTORY:  metastatic lung cancer      PERTINENT PM/SXH:   no pmhx or surgical history    FAMILY HISTORY:  no family history of lung cancer in first degree relatives.    Family Hx substance abuse [ ]yes [x ]no  ITEMS NOT CHECKED ARE NOT PRESENT    SOCIAL HISTORY:   Significant other/partner[x ]  Children[x ]  Sabianist/Spirituality:  Substance hx:  [ ]   Tobacco hx:  [ ]   Alcohol hx: [ ]   Home Opioid hx:  [ ] I-Stop Reference No:  Living Situation: [x ]Home  [ ]Long term care  [ ]Rehab [ ]Other    ADVANCE DIRECTIVES:    DNR/MOLST  [ ]  Living Will  [ ]   DECISION MAKER(s):  [ ] Health Care Proxy(s)  [x ] Surrogate(s)  [ ] Guardian           Name(s): Phone Number(s): Xavi Fry [phone]    BASELINE (I)ADL(s) (prior to admission):  [place]: [ x]Total  [ ] Moderate [ ]Dependent    Allergies    No Known Allergies    Intolerances    MEDICATIONS  (STANDING):  acetaminophen     Tablet .. 975 milliGRAM(s) Oral every 8 hours  dexAMETHasone  Injectable 4 milliGRAM(s) IV Push every 12 hours  fentaNYL   Patch  50 MICROgram(s)/Hr 1 Patch Transdermal every 72 hours  lactated ringers. 1000 milliLiter(s) (100 mL/Hr) IV Continuous <Continuous>  lactated ringers. 1000 milliLiter(s) (100 mL/Hr) IV Continuous <Continuous>  melatonin 5 milliGRAM(s) Oral at bedtime  metoclopramide Injectable 10 milliGRAM(s) IV Push every 8 hours  naloxegol 25 milliGRAM(s) Oral before breakfast  pantoprazole  Injectable 40 milliGRAM(s) IV Push every 12 hours  prochlorperazine   Injectable 10 milliGRAM(s) IV Push once  senna 2 Tablet(s) Oral at bedtime  sodium chloride 0.9%. 1000 milliLiter(s) (100 mL/Hr) IV Continuous <Continuous>    MEDICATIONS  (PRN):  HYDROmorphone  Injectable 1 milliGRAM(s) IV Push every 2 hours PRN Moderate Pain (4 - 6)  HYDROmorphone  Injectable 1.5 milliGRAM(s) IV Push every 2 hours PRN Severe Pain (7 - 10)  HYDROmorphone  Injectable 1 milliGRAM(s) IV Push every 6 hours PRN Severe Pain (7 - 10) - breakthrough pain  magnesium hydroxide Suspension 30 milliLiter(s) Oral daily PRN Constipation  naloxone Injectable 0.1 milliGRAM(s) IV Push every 3 minutes PRN Sedation/Repiratory distress  ondansetron Injectable 4 milliGRAM(s) IV Push every 6 hours PRN Nausea and/or Vomiting    PRESENT SYMPTOMS: [ ]Unable to self-report  [ ] CPOT [ ] PAINADs [ ] RDOS  Source if other than patient:  [ ]Family   [ ]Team     Pain: [x ]yes with movement and ambulation [ ]no  QOL impact -   Location -     R hip               Aggravating factors - movement  Quality - sharp  Radiation - to leg  Timing- intermittent with movement   Severity (0-10 scale): 10/10 when moving  Minimal acceptable level (0-10 scale):     CPOT:    https://www.[facility].org/getattachment/kor50n07-4t8k-1t2c-0u4r-3341k4681j8k/Critical-Care-Pain-Observation-Tool-(CPOT)    PAIN AD Score:   http://geriatrictoolkit.[email or web address]/painad.pdf (press ctrl +  left click to view)    Dyspnea:                           [ ]Mild [ ]Moderate [ ]Severe      RDOS:  0 to 2  minimal or no respiratory distress   3  mild distress  4 to 6 moderate distress  >7 severe distress  https://homecareinformation.[email or web address]/handouts/hen/Respiratory_Distress_Observation_Scale.pdf (Ctrl +  left click to view)     Anxiety:                             [ ]Mild [ ]Moderate [ ]Severe  Fatigue:                             [ ]Mild [ ]Moderate [ ]Severe  Nausea:                             [ ]Mild [ ]Moderate [ ]Severe  Loss of appetite:               [ ]Mild [ ]Moderate [ ]Severe  Constipation:                    [ ]Mild [ ]Moderate [ ]Severe    PCSSQ[Palliative Care Spiritual Screening Question]   Severity (0-10):  Chaplaincy Referral: [ ] yes [ ] refused [ ] following    Other Symptoms:  [ ]All other review of systems negative     Palliative Performance Status Version 2:  30-40       %    http://[email or web address]/files/news/palliative_performance_scale_[email or web address].pdf  PHYSICAL EXAM:  Vital Signs Last 24 Hrs  T(C): 36.6 (03 Aug 2022 07:11), Max: 36.9 (02 Aug 2022 16:32)  T(F): 97.9 (03 Aug 2022 07:11), Max: 98.5 (02 Aug 2022 23:45)  HR: 94 (03 Aug 2022 07:11) (82 - 108)  BP: 145/82 (03 Aug 2022 07:11) (129/82 - 148/84)  BP(mean): --  RR: 18 (03 Aug 2022 07:11) (18 - 18)  SpO2: 96% (03 Aug 2022 07:11) (93% - 96%)    Parameters below as of 03 Aug 2022 07:11  Patient On (Oxygen Delivery Method): room air     I&O's Summary    02 Aug 2022 07:01  -  03 Aug 2022 07:00  --------------------------------------------------------  IN: 600 mL / OUT: 750 mL / NET: -150 mL      GENERAL: [x ]Cachexia    [x ]Alert  [ x]Oriented x   [ ]Lethargic  [ ]Unarousable  x[ ]Verbal  [ ]Non-Verbal  Behavioral:   [ ] Anxiety  [ ] Delirium [ ] Agitation [ ] Other  HEENT:  [x ]Normal   [ ]Dry mouth   [ ]ET Tube/Trach  [ ]Oral lesions  PULMONARY:   [x ]Clear [ ]Tachypnea  [ ]Audible excessive secretions   [ ]Rhonchi        [ ]Right [ ]Left [ ]Bilateral  [ ]Crackles        [ ]Right [ ]Left [ ]Bilateral  [ ]Wheezing     [ ]Right [ ]Left [ ]Bilateral  [ ]Diminished breath sounds [ ]right [ ]left [ ]bilateral  CARDIOVASCULAR:    [ x]Regular [ ]Irregular [ ]Tachy  [ ]Blaine [ ]Murmur [ ]Other  GASTROINTESTINAL:  [x ]Soft  [ ]Distended   [x ]+BS  [ ]Non tender [ ]Tender  [ ]Other [ ]PEG [ ]OGT/ NGT  Last BM:  GENITOURINARY:  [x ]Normal [ ] Incontinent   [ ]Oliguria/Anuria   [ ]Gimenez  MUSCULOSKELETAL: r leg weakness 2/2 fracture  [x]Normal   [ ]Weakness  [ ]Bed/Wheelchair bound [ ]Edema  NEUROLOGIC:   [x ]No focal deficits  [ ]Cognitive impairment  [ ]Dysphagia [ ]Dysarthria [ ]Paresis [ ]Other   SKIN:   [x ]Normal  [ ]Rash  [ ]Other  [ ]Pressure ulcer(s)       Present on admission [ ]y [ ]n    CRITICAL CARE:  [ ] Shock Present  [ ]Septic [ ]Cardiogenic [ ]Neurologic [ ]Hypovolemic  [ ]  Vasopressors [ ]  Inotropes   [ ]Respiratory failure present [ ]Mechanical ventilation [ ]Non-invasive ventilatory support [ ]High flow    [ ]Acute  [ ]Chronic [ ]Hypoxic  [ ]Hypercarbic [ ]Other  [ ]Other organ failure     LABS:                        8.7    11.31 )-----------( 519      ( 03 Aug 2022 04:58 )             27.0   08-03    134<L>  |  96  |  22  ----------------------------<  141<H>  4.0   |  25  |  0.38<L>    Ca    8.2<L>      03 Aug 2022 04:58    PT/INR - ( 03 Aug 2022 04:58 )   PT: 17.8 sec;   INR: 1.53 ratio         PTT - ( 03 Aug 2022 04:58 )  PTT:26.3 sec      RADIOLOGY & ADDITIONAL STUDIES:    < from: CT Abdomen and Pelvis No Cont (07.30.22 @ 15:45) >  ACC: [id number] EXAM:  CT ABDOMEN AND PELVIS                          PROCEDURE DATE:  07/30/2022          INTERPRETATION:  CLINICAL INFORMATION: Right acetabular lesion with   nausea and vomiting. Evaluate for Alleene syndrome.    COMPARISON: CT scan of the abdomen and pelvis from 11/20/2019    CONTRAST/COMPLICATIONS:  IV Contrast: NONE. This limits evaluation of vascular structures.  Oral Contrast: NONE  Complications: None reported at time of study completion    PROCEDURE:  CT of the Abdomen and Pelvis was performed.  Sagittal and coronal reformats were performed.    FINDINGS:  LOWER CHEST: Moderate right pleural effusion not seen previously. Mild   right pleural thickening posteriorly. This is limited in evaluation on   this noncontrast CT scan. Pleural-based metastases cannot be excluded.   Multiple small pulmonary nodules bilaterally. For example, 7 mm in the   right middle lobe on series 3 image 17 and 5 mm in the left lower lobe on   series 3 image 28 which were not seen previously. Partial atelectasis of   the right lower lobe with air bronchograms.    LIVER: 2.4 cm hepatic cyst in segment 2 is stable.  BILE DUCTS: Normal caliber.  GALLBLADDER: Cholecystectomy clips.  SPLEEN: Within normal limits.  PANCREAS: Within normal limits.  ADRENALS: Fullness in the adrenal glands bilaterally. This was seen on   the old study. Please correlate clinically.  KIDNEYS/URETERS: 1.5 cm hyperdense cyst in the upper pole the right   kidney and 6 mm hyperdense cyst in the upper pole the left kidney. Mild   bilateral hydroureteronephrosis to the level of the very distended   bladder.    BLADDER: Air is very distended.  REPRODUCTIVE ORGANS: Grossly unremarkable.    BOWEL: Large amount of stool in the ascending colon. Descending colon is   very distended measuring up to 8.2 cm in the axial plane and   approximately 8.7 cm in the sagittal plane.. Appendix within normal   limits.  PERITONEUM: No ascites.  VESSELS: Mild vascular calcifications.  RETROPERITONEUM/LYMPH NODES: Right externaliliac lymph node measuring   2.3 x 1.2 cm on series 3 image 127.  ABDOMINAL WALL: Small umbilical hernia containing fat.  BONES: Large soft tissue mass with gross destruction of the right   acetabulum, and inferior and superior pubic rami, highly concerning for   malignancy. This is extending to the femoral head although I do not see   gross destruction of the femoral head. The soft tissue mass is   inseparable from a small portion of the right side of the bladder. There   is moderate fluid anterior to the right hip. Lytic lesion in the right   side of L1 vertebral body. These were not seen on the prior study.   Sclerotic densities in the left acetabulum are stable and may represent   bone islands.    IMPRESSION:  Large soft tissue mass with gross destruction of the right acetabulum,   and inferior and superior pubic rami highly concerning for malignancy.   The soft tissue mass is inseparable from a small portion the right side   of the bladder and invasion cannot be excluded.. Lytic lesion in L1   vertebral body. Multiple bilateral pleural nodules highly concerning for   metastatic disease. Moderate right pleural effusion with mild posterior   pleural thickening.. Pleural-based metastases cannot be excluded. Right   external iliac lymph node raises concern for metastatic disease.    Gross distention of the ascending colon. There is a large amount of stool   and this may be due to fecal impaction. Patrick syndrome would be   considered if fecal impaction is relieved and dilatationpersisted.   Please correlate clinically.    Mild bilateral hydronephrosis which may be due to the very distended   bladder.        --- End of Report ---            ANNIE MENJIVAR MD; Attending Radiologist  This document has been electronically signed. Jul 30 2022  5:12PM    < end of copied text >      PROTEIN CALORIE MALNUTRITION PRESENT: [ ]mild [ ]moderate [ ]severe [ ]underweight [ ]morbid obesity  https://www.andeal.org/vault/2440/web/files/ONC/Table_Clinical%20Characteristics%20to%20Document%20Malnutrition-White%20JV%20et%20al%202012.pdf    Height (cm): 157.5 (07-28-22 @ 23:04)  Weight (kg): 55.8 (07-28-22 @ 23:04)  BMI (kg/m2): 22.5 (07-28-22 @ 23:04)    [ ]PPSV2 < or = to 30% [ ]significant weight loss  [ ]poor nutritional intake  [ ]anasarca[ ]Artificial Nutrition      Other REFERRALS:  [ ]Hospice  [ ]Child Life  [ ]Social Work  [ ]Case management [ ]Holistic Therapy     Goals of Care Document:

## 2022-08-03 NOTE — PROGRESS NOTE ADULT - SUBJECTIVE AND OBJECTIVE BOX
MADELINE [id number]  62yFemale  T(C): 36.6 (08-03-22 @ 07:11), Max: 36.9 (08-02-22 @ 16:32)  HR: 94 (08-03-22 @ 07:11) (82 - 108)  BP: 145/82 (08-03-22 @ 07:11) (129/82 - 148/84)  RR: 18 (08-03-22 @ 07:11) (18 - 18)  SpO2: 96% (08-03-22 @ 07:11) (93% - 96%)  Wt(kg): --  08-02 @ 07:01  -  08-03 @ 07:00  --------------------------------------------------------  IN: 600 mL / OUT: 750 mL / NET: -150 mL      normal cephalic atraumatic  s1s2   clear to ascultation bilaterally  soft, non tender, non distended no guarding or rebound  no clubbing cyanosis or edema

## 2022-08-03 NOTE — PROGRESS NOTE ADULT - PROBLEM SELECTOR PLAN 3
Patient has been following up with a oncologist in [place]  States she recently had a bone biopsy but doesn't have the results  will need to eventually start chemo +/- RT.

## 2022-08-03 NOTE — PRE-ANESTHESIA EVALUATION ADULT - NSANTHPMHFT_GEN_ALL_CORE
63 y/o woman with recently diagnosed lung CA with meds to brain and bone. 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

## 2022-08-03 NOTE — PROGRESS NOTE ADULT - ASSESSMENT
63 yo woman with recently diagnosed lung CA with meds to brain and bone presents with a right pathologic hip fracture s/p an Open reduction and internal fixation

## 2022-08-03 NOTE — CONSULT NOTE ADULT - PROBLEM SELECTOR RECOMMENDATION 4
kps 30-40%  met with pt at bedside.  She has a good understanding of her medical conditions and hospital course.    Pt reports no pain when she is in bed.  She states that is definelty when she is moved or has to get out of bed.  Pt aware of prn dilaudid that may be used.  Plan for OR today.  Palliative care will f/u tomorrow.

## 2022-08-03 NOTE — CHART NOTE - NSCHARTNOTEFT_GEN_A_CORE
Patient seen in PACU resting without complaints.  No Chest Pain, SOB, N/V.    T(C): 36.7 (08-03-22 @ 19:45), Max: 36.9 (08-02-22 @ 23:45)  HR: 97 (08-03-22 @ 20:15) (82 - 111)  BP: 133/74 (08-03-22 @ 20:15) (124/67 - 152/70)  RR: 16 (08-03-22 @ 20:15) (15 - 18)  SpO2: 96% (08-03-22 @ 20:15) (94% - 100%)  Wt(kg): --    Exam:  Alert and Oriented, No Acute Distress  Laterality: RLE prevena and HMV in place maintaining good suction  Calves soft, non-tender bilaterally  +PF/DF/EHL/FHL  SILT  + DP pulse    Xray:----                          9.8    11.61 )-----------( 316      ( 03 Aug 2022 18:27 )             29.6    08-03    137  |  100  |  21  ----------------------------<  163<H>  3.7   |  37<H>  |  0.42<L>    Ca    7.7<L>      03 Aug 2022 18:27        A/P: 62yFemale S/p Right acetabular mass excision, right complex total hip replacement . VSS. NAD  -PT/OT-WBAT RLE with posterior precautions, OOB in AM  -IS encouraged  -DVT PPx with lovenox  -Followup AM labs  -Pain Control with PCA, followup palliative recs  -PACU to floor    Fariba Zabala PA-C  Team Pager #[phone] Patient seen in PACU resting without complaints.  No Chest Pain, SOB, N/V.    T(C): 36.7 (08-03-22 @ 19:45), Max: 36.9 (08-02-22 @ 23:45)  HR: 97 (08-03-22 @ 20:15) (82 - 111)  BP: 133/74 (08-03-22 @ 20:15) (124/67 - 152/70)  RR: 16 (08-03-22 @ 20:15) (15 - 18)  SpO2: 96% (08-03-22 @ 20:15) (94% - 100%)  Wt(kg): --    Exam:  Alert and Oriented, No Acute Distress  Laterality: RLE prevena and HMV in place maintaining good suction  Calves soft, non-tender bilaterally  +PF/DF/EHL/FHL  SILT  + DP pulse    Xray:----                          9.8    11.61 )-----------( 316      ( 03 Aug 2022 18:27 )             29.6    08-03    137  |  100  |  21  ----------------------------<  163<H>  3.7   |  37<H>  |  0.42<L>    Ca    7.7<L>      03 Aug 2022 18:27        A/P: 62yFemale S/p Right acetabular mass excision, right complex total hip replacement . VSS. NAD  -PT/OT-WBAT RLE with anterior precautions, OOB in AM  -IS encouraged  -DVT PPx with lovenox  -Followup AM labs  -Pain Control with PCA, followup palliative recs  -PACU to floor    Fariba Zabala PA-C  Team Pager #[phone]

## 2022-08-04 NOTE — OCCUPATIONAL THERAPY INITIAL EVALUATION ADULT - PRECAUTIONS/LIMITATIONS, REHAB EVAL
R anterior/posterior hip precautions per Ortho PA Vesta. (CONT) R hip lesion was biopsied at prior hospital and consistent with metastatic lung cancer. Pt is a community ambulator at baseline without assistive devices. CXR: Bilateral lower lung opacities likely representing atelectasis. s/p R acetabular mass excision, right complex total hip replacement on 8/3. POD#1, has Provena and HMV. Required 2U pRBCs intra-op, 1U pRBCs post-op./right hip precautions/spinal precautions R anterior/posterior hip precautions per Ortho PA Vesta. (CONT) R hip lesion was biopsied at prior hospital and consistent with metastatic lung cancer. Pt is a community ambulator at baseline without assistive devices. CXR: Bilateral lower lung opacities likely representing atelectasis. s/p R acetabular mass excision, right complex total hip replacement on 8/3. POD#1, has Provena and HMV. Required 2U pRBCs intra-op, 1U pRBCs post-op./right hip precautions

## 2022-08-04 NOTE — OCCUPATIONAL THERAPY INITIAL EVALUATION ADULT - PERTINENT HX OF CURRENT PROBLEM, REHAB EVAL
62F presents as direct transfer from [facility], c/o R hip pain that begain in May, then this past weekend was unable to bear weight and went to nearby hospital, where she was found to have R acetabular lesion, lung lesions and brain lesions per pt. (CONT BELOW)

## 2022-08-04 NOTE — OCCUPATIONAL THERAPY INITIAL EVALUATION ADULT - LIVES WITH, PROFILE
Pt lives with spouse in [facility] with [address]. Pt unable to ambulate 2-3 days PTA. Prior to admission, pt previously independent with self care and functional mobility without AD. Pt lives with spouse in [facility] with [address]. Pt unable to ambulate 2-3 days PTA. Prior to admission, pt previously independent with self care and functional mobility without AD./spouse

## 2022-08-04 NOTE — PROGRESS NOTE ADULT - PROBLEM SELECTOR PLAN 3
Patient has been following up with a oncologist in [place]  States she recently had a bone biopsy but doesn't have the results  will need to eventually start chemo +/- RT.

## 2022-08-04 NOTE — PHYSICAL THERAPY INITIAL EVALUATION ADULT - PRECAUTIONS/LIMITATIONS, REHAB EVAL
fall precautions
s/p R acetabular mass excision, right complex total hip replacement on 8/3. POD#1, has Provena and HMV. Required 2U pRBCs intra-op, 1U pRBCs post-op/fall precautions

## 2022-08-04 NOTE — PHYSICAL THERAPY INITIAL EVALUATION ADULT - MANUAL MUSCLE TESTING RESULTS, REHAB EVAL
BUE & LLE at least 4/5, RLE grossly 2/5 (limited by pain)./grossly assessed due to
BUE & LLE at least 4/5, RLE grossly 3/5 (limited by pain)./grossly assessed due to

## 2022-08-04 NOTE — PHYSICAL THERAPY INITIAL EVALUATION ADULT - GENERAL OBSERVATIONS, REHAB EVAL
Chart reviewed events to date noted. Blood glucose reviewed. Pt tolerated 45min PT initial evaluation well. Pt rec'd in bed in NAD, premedicated. Pt seen at request of Ortho team during IDR, pt pending OR 8/3 for repair of metestatic fx R pelvis (PWB 25% RLE & WBAT L1 lytic lesion per JAY Corcoran).
Chart reviewed events to date noted. Blood glucose reviewed. Pt tolerated 45min PT initial evaluation well. Pt rec'd in bed in NAD, premedicated. Pt seen at request of Ortho team during IDR, pt pending OR 8/3 for repair of metestatic fx R pelvis (PWB 25% RLE & WBAT L1 lytic lesion per JAY Corcoran).

## 2022-08-04 NOTE — PHYSICAL THERAPY INITIAL EVALUATION ADULT - GAIT TRAINING, PT EVAL
GOAL: In 4 weeks pt will amb 300-400 ft independently with least restrictive device.
GOAL: In 4 weeks pt will amb 150 ft independently with least restrictive device.

## 2022-08-04 NOTE — PHYSICAL THERAPY INITIAL EVALUATION ADULT - PERTINENT HX OF CURRENT PROBLEM, REHAB EVAL
63y/o F presents as direct transfer from [facility], c/o R hip pain that begain in May, then this past weekend was unable to bear weight and went to nearby hospital, where she was found to have R acetabular lesion, lung lesions and brain lesions per pt. R hip lesion was biopsied at prior hospital and consistent with metastatic lung cancer. Pt is a community ambulator at baseline without assistive devices. CXR:Bilateral lower lung opacities likely representing atelectasis.
63y/o F presents as direct transfer from [facility], c/o R hip pain that begain in May, then this past weekend was unable to bear weight and went to nearby hospital, where she was found to have R acetabular lesion, lung lesions and brain lesions per pt. R hip lesion was biopsied at prior hospital and consistent with metastatic lung cancer. Pt is a community ambulator at baseline without assistive devices. CXR:Bilateral lower lung opacities likely representing atelectasis.

## 2022-08-04 NOTE — PROGRESS NOTE ADULT - SUBJECTIVE AND OBJECTIVE BOX
Anesthesia Pain Management Service    SUBJECTIVE: Patient is doing well with IV PCA    OBJECTIVE:       Pain Scale Score:	[X] Refer to charted pain scores            MEDICATIONS  (STANDING):  enoxaparin Injectable 40 milliGRAM(s) SubCutaneous every 24 hours  HYDROmorphone PCA (1 mG/mL) 30 milliLiter(s) PCA Continuous PCA Continuous  multivitamin 1 Tablet(s) Oral daily  pantoprazole    Tablet 40 milliGRAM(s) Oral before breakfast  polyethylene glycol 3350 17 Gram(s) Oral at bedtime  senna 2 Tablet(s) Oral at bedtime    MEDICATIONS  (PRN):  aluminum hydroxide/magnesium hydroxide/simethicone Suspension 30 milliLiter(s) Oral four times a day PRN Indigestion  HYDROmorphone PCA (1 mG/mL) Rescue Clinician Bolus 0.5 milliGRAM(s) IV Push every 15 minutes PRN for Pain Scale GREATER THAN 6  magnesium hydroxide Suspension 30 milliLiter(s) Oral daily PRN Constipation  nalbuphine Injectable 2.5 milliGRAM(s) IV Push every 6 hours PRN Pruritus  naloxone Injectable 0.1 milliGRAM(s) IV Push every 3 minutes PRN For ANY of the following changes in patient status:  A. RR LESS THAN 10 breaths per minute, B. Oxygen saturation LESS THAN 90%, C. Sedation score of 6  ondansetron Injectable 4 milliGRAM(s) IV Push every 6 hours PRN Nausea  ondansetron Injectable 4 milliGRAM(s) IV Push every 6 hours PRN Nausea and/or Vomiting          Sedation Score:	[ X] Alert	[ ] Drowsy 	[ ] Arousable	[ ] Asleep	[ ] Unresponsive        Side Effects:	[X ] None	[ ] Nausea	[ ] Vomiting	[ ] Pruritus  		[ ] Other:       Vital Signs Last 24 Hrs  T(C): 36.7 (04 Aug 2022 04:00), Max: 36.9 (03 Aug 2022 11:31)  T(F): 98.1 (04 Aug 2022 04:00), Max: 98.4 (03 Aug 2022 11:31)  HR: 98 (04 Aug 2022 04:00) (93 - 111)  BP: 117/76 (04 Aug 2022 04:00) (111/74 - 152/70)  BP(mean): 99 (03 Aug 2022 20:15) (99 - 103)  RR: 16 (04 Aug 2022 04:00) (15 - 18)  SpO2: 96% (04 Aug 2022 04:00) (94% - 100%)    Parameters below as of 04 Aug 2022 04:00  Patient On (Oxygen Delivery Method): room air        ASSESSMENT/ PLAN    Therapy to  be:               [] Continued   [X] Discontinued   [X] Change to PRN Analgesics IV dilaudid, can consider restarting fentanyl patch 25mcg    Documentation and Verification of current medications:   [X] Done	[ ] Not done, not eligible   Anesthesia Pain Management Service    SUBJECTIVE: Patient endorses that she does not prefer to use her PCA, is forgetting to press the button and falling behind with regards to pain management. Would prefer to return to fentanyl patch or IV push PRN (with plan to transition to PO analgesics once patient is allowed PO).    OBJECTIVE:       Pain Scale Score:	[X] Refer to charted pain scores            MEDICATIONS  (STANDING):  enoxaparin Injectable 40 milliGRAM(s) SubCutaneous every 24 hours  HYDROmorphone PCA (1 mG/mL) 30 milliLiter(s) PCA Continuous PCA Continuous  multivitamin 1 Tablet(s) Oral daily  pantoprazole    Tablet 40 milliGRAM(s) Oral before breakfast  polyethylene glycol 3350 17 Gram(s) Oral at bedtime  senna 2 Tablet(s) Oral at bedtime    MEDICATIONS  (PRN):  aluminum hydroxide/magnesium hydroxide/simethicone Suspension 30 milliLiter(s) Oral four times a day PRN Indigestion  HYDROmorphone PCA (1 mG/mL) Rescue Clinician Bolus 0.5 milliGRAM(s) IV Push every 15 minutes PRN for Pain Scale GREATER THAN 6  magnesium hydroxide Suspension 30 milliLiter(s) Oral daily PRN Constipation  nalbuphine Injectable 2.5 milliGRAM(s) IV Push every 6 hours PRN Pruritus  naloxone Injectable 0.1 milliGRAM(s) IV Push every 3 minutes PRN For ANY of the following changes in patient status:  A. RR LESS THAN 10 breaths per minute, B. Oxygen saturation LESS THAN 90%, C. Sedation score of 6  ondansetron Injectable 4 milliGRAM(s) IV Push every 6 hours PRN Nausea  ondansetron Injectable 4 milliGRAM(s) IV Push every 6 hours PRN Nausea and/or Vomiting          Sedation Score:	[ X] Alert	[ ] Drowsy 	[ ] Arousable	[ ] Asleep	[ ] Unresponsive        Side Effects:	[X ] None	[ ] Nausea	[ ] Vomiting	[ ] Pruritus  		[ ] Other:       Vital Signs Last 24 Hrs  T(C): 36.7 (04 Aug 2022 04:00), Max: 36.9 (03 Aug 2022 11:31)  T(F): 98.1 (04 Aug 2022 04:00), Max: 98.4 (03 Aug 2022 11:31)  HR: 98 (04 Aug 2022 04:00) (93 - 111)  BP: 117/76 (04 Aug 2022 04:00) (111/74 - 152/70)  BP(mean): 99 (03 Aug 2022 20:15) (99 - 103)  RR: 16 (04 Aug 2022 04:00) (15 - 18)  SpO2: 96% (04 Aug 2022 04:00) (94% - 100%)    Parameters below as of 04 Aug 2022 04:00  Patient On (Oxygen Delivery Method): room air        ASSESSMENT/ PLAN    Recommend therapy to be:               [] Continued   [X] Discontinued   [X]  Can consider restarting fentanyl patch 25mcg and change to PRN Analgesics IV dilaudid,    Documentation and Verification of current medications:   [X] Done	[ ] Not done, not eligible

## 2022-08-04 NOTE — PROGRESS NOTE ADULT - SUBJECTIVE AND OBJECTIVE BOX
Orthopedic Surgery Progress Note     S: Patient seen and examined today. No acute events overnight. Pain is well controlled. Denies f/c, chest pain, shortness of breath, dizziness.    MEDICATIONS  (STANDING):  enoxaparin Injectable 40 milliGRAM(s) SubCutaneous every 24 hours  HYDROmorphone PCA (1 mG/mL) 30 milliLiter(s) PCA Continuous PCA Continuous  multivitamin 1 Tablet(s) Oral daily  pantoprazole    Tablet 40 milliGRAM(s) Oral before breakfast  polyethylene glycol 3350 17 Gram(s) Oral at bedtime  senna 2 Tablet(s) Oral at bedtime    MEDICATIONS  (PRN):  aluminum hydroxide/magnesium hydroxide/simethicone Suspension 30 milliLiter(s) Oral four times a day PRN Indigestion  HYDROmorphone PCA (1 mG/mL) Rescue Clinician Bolus 0.5 milliGRAM(s) IV Push every 15 minutes PRN for Pain Scale GREATER THAN 6  magnesium hydroxide Suspension 30 milliLiter(s) Oral daily PRN Constipation  nalbuphine Injectable 2.5 milliGRAM(s) IV Push every 6 hours PRN Pruritus  naloxone Injectable 0.1 milliGRAM(s) IV Push every 3 minutes PRN For ANY of the following changes in patient status:  A. RR LESS THAN 10 breaths per minute, B. Oxygen saturation LESS THAN 90%, C. Sedation score of 6  ondansetron Injectable 4 milliGRAM(s) IV Push every 6 hours PRN Nausea  ondansetron Injectable 4 milliGRAM(s) IV Push every 6 hours PRN Nausea and/or Vomiting      Physical Exam:  NAD, AOx3  No increased work of breathing    RLE:  Wedge b/t legs  WWP; DP/PT 2+ b/l  Calves NTP b/l, SCDs in place  Provena, hemovac at hip in place good suction, c/d/i  SILT, strength 5/5          Vital Signs Last 24 Hrs  T(C): 36.7 (04 Aug 2022 04:00), Max: 36.9 (03 Aug 2022 11:31)  T(F): 98.1 (04 Aug 2022 04:00), Max: 98.4 (03 Aug 2022 11:31)  HR: 98 (04 Aug 2022 04:00) (82 - 111)  BP: 117/76 (04 Aug 2022 04:00) (111/74 - 152/70)  BP(mean): 99 (03 Aug 2022 20:15) (99 - 103)  RR: 16 (04 Aug 2022 04:00) (15 - 18)  SpO2: 96% (04 Aug 2022 04:00) (94% - 100%)    Parameters below as of 04 Aug 2022 04:00  Patient On (Oxygen Delivery Method): room air        08-02-22 @ 07:01  -  08-03-22 @ 07:00  --------------------------------------------------------  IN: 600 mL / OUT: 750 mL / NET: -150 mL    08-03-22 @ 07:01  -  08-04-22 @ 06:05  --------------------------------------------------------  IN: 920 mL / OUT: 785 mL / NET: 135 mL                    LABS:                        9.8    11.61 )-----------( 316      ( 03 Aug 2022 18:27 )             29.6     08-03    137  |  100  |  21  ----------------------------<  163<H>  3.7   |  37<H>  |  0.42<L>    Ca    7.7<L>      03 Aug 2022 18:27

## 2022-08-04 NOTE — PROGRESS NOTE ADULT - ASSESSMENT
****INCOMPLETE****    63 yo F with a PMH of recently diagnosed metastatic adenocarcinoma of the lung (heavy smoker) who presented as a direct transfer from [facility] for right pathological R hip fracture.    #Metastatic Lung Adenocarcinoma/R hip fracture  - Recently diagnosed lung adenocarcinoma in the RLL (2 cm) with metastasis to the R acetabulum (biopsy confirmed), L1 (1.5 cm), trace R pleural effusion, R hilar/subcarinal LAD, and brain  - PD-L1 20-30%, MSI stable  - CT 07/2022 showed 7.7 x 6.9 x 4.2 cm destructive lesion with soft tissue mass involving the medial column of the right acetabulum and extending into the adjacent ischium.  - Multifocal intraparenchymal brain metastasis with the largest lesions in the L frontal lobe measuring 0.8 cm  - Patient follows up with Dr. Nancie Melendez in [place]  - S/p 2 sessions of RT to the hip in mid-July (initially planned for 5 sessions), but further tx held because of worsening pain due to pathological fracture  - No systemic treatment yet  - ECOG performance status 1 as baseline  - S/p complex total R hip replacement  - Post-op care including pain control as per Ortho  - Likely will have rehab after surgery, no RT for at least 4 weeks. Patient to f/u for immunotherapy with Dr. LaNatra Aryles Hedjar, MD, PGY-5  Hematology/Oncology Fellow  [facility]  Pager: [phone]  After 5PM and on weekends and holidays, please call the inpatient fellow on call. ****INCOMPLETE****    63 yo F with a PMH of recently diagnosed metastatic adenocarcinoma of the lung (heavy smoker) who presented as a direct transfer from [facility] for right pathological R hip fracture.    #Metastatic Lung Adenocarcinoma/R hip fracture  - Recently diagnosed lung adenocarcinoma in the RLL (2 cm) with metastasis to the R acetabulum (biopsy confirmed), L1 (1.5 cm), trace R pleural effusion, R hilar/subcarinal LAD, and brain  - PD-L1 20-30%, MSI stable, TMB low (5.5 mut/Mb). NGS shows KRAS G13C, P53, and MET mutations  - CT 07/2022 showed 7.7 x 6.9 x 4.2 cm destructive lesion with soft tissue mass involving the medial column of the right acetabulum and extending into the adjacent ischium.  - Multifocal intraparenchymal brain metastasis with the largest lesions in the L frontal lobe measuring 0.8 cm  - Patient follows up with Dr. Nancie Melendez in [place]  - S/p 2 sessions of RT to the hip in mid-July (initially planned for 5 sessions), but further tx held because of worsening pain due to pathological fracture  - No systemic treatment yet  - ECOG performance status 1 as baseline  - S/p complex total R hip replacement  - Post-op care including pain control as per Ortho  - Likely will have rehab after surgery, no RT for at least 4 weeks. Patient to f/u for immunotherapy with Dr. Melendez  - No Oncological intervention inpatient        Aryles Hedjar, MD, PGY-5  Hematology/Oncology Fellow  [facility]  Pager: [phone]  After 5PM and on weekends and holidays, please call the inpatient fellow on call. 63 yo F with a PMH of recently diagnosed metastatic adenocarcinoma of the lung (heavy smoker) who presented as a direct transfer from [facility] for right pathological R hip fracture.    #Metastatic Lung Adenocarcinoma/R hip fracture  - Recently diagnosed lung adenocarcinoma in the RLL (2 cm) with metastasis to the R acetabulum (biopsy confirmed), L1 (1.5 cm), trace R pleural effusion, R hilar/subcarinal LAD, and brain  - PD-L1 20-30%, MSI stable, TMB low (5.5 mut/Mb). NGS shows KRAS G13C, P53, and MET mutations  - CT 07/2022 showed 7.7 x 6.9 x 4.2 cm destructive lesion with soft tissue mass involving the medial column of the right acetabulum and extending into the adjacent ischium.  - Multifocal intraparenchymal brain metastasis with the largest lesions in the L frontal lobe measuring 0.8 cm  - Patient follows up with Dr. Nancie Melendez in [place]  - S/p 2 sessions of RT to the hip in mid-July (initially planned for 5 sessions), but further tx held because of worsening pain due to pathological fracture  - No systemic treatment yet  - ECOG performance status 1 as baseline  - S/p complex total R hip replacement  - Post-op care including pain control as per Ortho  - Likely will have rehab after surgery, no RT for at least 4 weeks. Patient to f/u for immunotherapy with Dr. Melendez  - No Oncological intervention inpatient    Will sign off, please call or re-consult with questions or concerns.        Aryles Hedjar, MD, PGY-5  Hematology/Oncology Fellow  [facility]  Pager: [phone]  After 5PM and on weekends and holidays, please call the inpatient fellow on call.

## 2022-08-04 NOTE — PROGRESS NOTE ADULT - SUBJECTIVE AND OBJECTIVE BOX
SUBJECTIVE AND OBJECTIVE: Pt seen with  present at bedside. Pt sitting in chair next to bed. Reports ongoing pain which is worsened by movement but is significantly improved with pain medications.    OVERNIGHT EVENTS: Pt started on PCA yesterday s/p OR. D/C this AM by anesthesia.     Indication for Geriatrics and Palliative Care Services/INTERVAL HPI: symptom management    DNR on chart:  Allergies    No Known Allergies    Intolerances    MEDICATIONS  (STANDING):  enoxaparin Injectable 40 milliGRAM(s) SubCutaneous every 24 hours  fentaNYL   Patch  50 MICROgram(s)/Hr 1 Patch Transdermal every 72 hours  multivitamin 1 Tablet(s) Oral daily  pantoprazole    Tablet 40 milliGRAM(s) Oral before breakfast  polyethylene glycol 3350 17 Gram(s) Oral at bedtime  senna 2 Tablet(s) Oral at bedtime    MEDICATIONS  (PRN):  aluminum hydroxide/magnesium hydroxide/simethicone Suspension 30 milliLiter(s) Oral four times a day PRN Indigestion  HYDROmorphone  Injectable 1 milliGRAM(s) IV Push every 2 hours PRN Moderate Pain (4 - 6)  HYDROmorphone  Injectable 1.5 milliGRAM(s) IV Push every 2 hours PRN Severe Pain (7 - 10)  HYDROmorphone  Injectable 1.5 milliGRAM(s) IV Push every 2 hours PRN Severe Pain (7 - 10)  magnesium hydroxide Suspension 30 milliLiter(s) Oral daily PRN Constipation  ondansetron Injectable 4 milliGRAM(s) IV Push every 6 hours PRN Nausea and/or Vomiting      ITEMS UNCHECKED ARE NOT PRESENT    PRESENT SYMPTOMS: [ ]Unable to self-report - see [ ] CPOT [ ] PAINADS [ ] RDOS  Source if other than patient:  [ ]Family   [ ]Team     Pain: [x ]yes  [ ]no  QOL impact - impairs mobility  Location - R hip               Aggravating factors - movement  Quality - sharp  Radiation - from hip to leg  Timing- intermittent with movement   Severity (0-10 scale): 10 w/ movement  Minimal acceptable level (0-10 scale): 2-3    CPOT:    https://www.sccm.org/getattachment/elz71u91-0v2b-3a6i-1u6x-0010i0587f2a/Critical-Care-Pain-Observation-Tool-(CPOT)    PAIN AD Score:	  http://geriatrictoolkit.[facility]/cog/painad.pdf (Ctrl + left click to view)    Dyspnea:      No                     [ ]Mild [ ]Moderate [ ]Severe    RDOS:  0 to 2  minimal or no respiratory distress   3  mild distress  4 to 6 moderate distress  >7 severe distress  https://[employer]ience[employer].net/handouts/hen/Respiratory_Distress_Observation_Scale.pdf (Ctrl +  left click to view)     Anxiety:                             [ ]Mild [ ]Moderate [ ]Severe  Fatigue:                             [ ]Mild [ ]Moderate [ ]Severe  Nausea:                             [ ]Mild [ ]Moderate [ ]Severe  Loss of appetite:              [ ]Mild [ ]Moderate [ ]Severe  Constipation:                    [ ]Mild [ ]Moderate [ ]Severe    Other Symptoms:  [x]All other review of systems negative     Palliative Performance Status Version 2:   50      %      http://npcrc.org/files/news/palliative_performance_scale_ppsv2.pdf  PHYSICAL EXAM:  Vital Signs Last 24 Hrs  T(C): 37.1 (04 Aug 2022 13:57), Max: 37.1 (04 Aug 2022 13:57)  T(F): 98.8 (04 Aug 2022 13:57), Max: 98.8 (04 Aug 2022 13:57)  HR: 107 (04 Aug 2022 13:57) (93 - 111)  BP: 120/79 (04 Aug 2022 13:57) (111/74 - 152/70)  BP(mean): 99 (03 Aug 2022 20:15) (99 - 103)  RR: 16 (04 Aug 2022 13:57) (15 - 18)  SpO2: 97% (04 Aug 2022 13:57) (94% - 100%)    Parameters below as of 04 Aug 2022 13:57  Patient On (Oxygen Delivery Method): room air     I&O's Summary    03 Aug 2022 07:01  -  04 Aug 2022 07:00  --------------------------------------------------------  IN: 2020 mL / OUT: 785 mL / NET: 1235 mL    04 Aug 2022 07:01  -  04 Aug 2022 15:56  --------------------------------------------------------  IN: 480 mL / OUT: 350 mL / NET: 130 mL       GENERAL:  [x]Alert  [x]Oriented x 3  [ ]Lethargic  [ ]Cachexia  [ ]Unarousable  [x]Verbal  [ ]Non-Verbal  Behavioral:   [ ]Anxiety  [ ]Delirium [ ]Agitation [ ]Other  HEENT:  [x]Normal   [ ]Dry mouth   [ ]ET Tube/Trach  [ ]Oral lesions  PULMONARY:   [x]Clear [ ]Tachypnea  [ ]Audible excessive secretions   [ ]Rhonchi        [ ]Right [ ]Left [ ]Bilateral  [ ]Crackles        [ ]Right [ ]Left [ ]Bilateral  [ ]Wheezing     [ ]Right [ ]Left [ ]Bilateral  [ ]Diminished BS [ ] Right [ ]Left [ ]Bilateral  CARDIOVASCULAR:    [x]Regular [ ]Irregular [ ]Tachy  [ ]Blaine [ ]Murmur [ ]Other  GASTROINTESTINAL:  [x]Soft  [ x]Distended   [x]+BS  [x]Non tender [ ]Tender  [ ]PEG [ ]OGT/ NGT   Last BM:    GENITOURINARY:  [x]Normal [ ]Incontinent   [ ]Oliguria/Anuria   [ ]Gimenez  MUSCULOSKELETAL:   [ ]Normal   [x]Weakness  [ ]Bed/Wheelchair bound [ ]Edema   NEUROLOGIC:   [x]No focal deficits  [ ] Cognitive impairment  [ ] Dysphagia [ ]Dysarthria [ ] Paresis [ ]Other   SKIN:   [x]Normal  [ ]Rash   [ ]Pressure ulcer(s) [ ]y [ ]n present on admission    CRITICAL CARE:  [ ]Shock Present  [ ]Septic [ ]Cardiogenic [ ]Neurologic [ ]Hypovolemic  [ ]Vasopressors [ ]Inotropes  [ ]Respiratory failure present [ ]Mechanical Ventilation [ ]Non-invasive ventilatory support [ ]High-Flow   [ ]Acute  [ ]Chronic [ ]Hypoxic  [ ]Hypercarbic [ ]Other  [ ]Other organ failure     LABS:                        9.0    14.85 )-----------( 347      ( 04 Aug 2022 09:48 )             28.0   08-04    135  |  99  |  18  ----------------------------<  106<H>  4.0   |  22  |  0.37<L>    Ca    7.8<L>      04 Aug 2022 09:48    PT/INR - ( 03 Aug 2022 04:58 )   PT: 17.8 sec;   INR: 1.53 ratio         PTT - ( 03 Aug 2022 04:58 )  PTT:26.3 sec      RADIOLOGY & ADDITIONAL STUDIES:    < from: Xray Pelvis AP only (08.03.22 @ 17:25) >    ACC: [id number] EXAM:  XR PELVIS AP ONLY 1-2 VIEWS                          PROCEDURE DATE:  08/03/2022          INTERPRETATION:  CLINICAL INDICATION: baseline postoperative evaluation   status post right hip replacement and acetabular stabilization    EXAM:  AP views of the pelvis and hips from 8/3/2022 at 1725. Compared to   preoperative study from 7/25/2022.    IMPRESSION:  Long stem right total hip prosthesis with cup-cage acetabular   reconstruction component and periacetabular cement reinforcement   implanted.    Intact and aligned hardware and no periprosthetic fractures.    Postoperative soft tissue changes.    Surgical skin staples overlie and drain operative site.    Correlate with intraoperative findings.    Redemonstrated lytic metastatic destructive changes of the right ischium   and portions of the remnant right superior and inferior pubic rami.    --- End of Report ---        LONNY ALLAN MD; Attending Radiologist  This document has been electronically signed. Aug  4 2022  9:25AM    < end of copied text >      Protein Calorie Malnutrition Present: [ ]mild [ ]moderate [ ]severe [ ]underweight [ ]morbid obesity  https://www.andeal.org/vault/2440/web/files/ONC/Table_Clinical%20Characteristics%20to%20Document%20Malnutrition-White%20JV%20et%20al%202012.pdf    Height (cm): 157.5 (08-03-22 @ 12:16)  Weight (kg): 55.8 (08-03-22 @ 12:16)  BMI (kg/m2): 22.5 (08-03-22 @ 12:16)    [ ]PPSV2 < or = 30%  [ ]significant weight loss [ ]poor nutritional intake [ ]anasarca[ ]Artificial Nutrition    REFERRALS:   [ ]Chaplaincy  [ ]Hospice  [ ]Child Life  [ ]Social Work  [ ]Case management [ ]Holistic Therapy     Goals of Care Document:

## 2022-08-04 NOTE — PROGRESS NOTE ADULT - SUBJECTIVE AND OBJECTIVE BOX
62y Female presents as direct transfer from [facility], c/o R hip pain that begain in May, then this past weekend was unable to bear weight and went to nearby hospital, where she was found to have R acetabular lesion, lung lesions and brain lesions per pt. R hip lesion was biopsied at prior hospital and consistent with metastatic lung cancer. Patient denies numbness. Denies trauma. Patient denies headstrike or LOC. Patient denies radiation of pain. Patient denies numbness/tingling/burning in the RLE. No other bone/joint complaints. Patient is a community ambulator at baseline without assistive devices. Patient seen now resting with continue complaint of pain in the right leg. Patient s/p CT scan which shows stool in the colon. Patient with continue complaint of pain at the fracture site. Patient seen Post op. tolerated the procedure well. Pain seems better controlled.       MEDICATIONS  (STANDING):  enoxaparin Injectable 40 milliGRAM(s) SubCutaneous every 24 hours  fentaNYL   Patch  50 MICROgram(s)/Hr 1 Patch Transdermal every 72 hours  multivitamin 1 Tablet(s) Oral daily  pantoprazole    Tablet 40 milliGRAM(s) Oral before breakfast  polyethylene glycol 3350 17 Gram(s) Oral at bedtime  senna 2 Tablet(s) Oral at bedtime    MEDICATIONS  (PRN):  aluminum hydroxide/magnesium hydroxide/simethicone Suspension 30 milliLiter(s) Oral four times a day PRN Indigestion  HYDROmorphone  Injectable 1 milliGRAM(s) IV Push every 2 hours PRN Moderate Pain (4 - 6)  HYDROmorphone  Injectable 1.5 milliGRAM(s) IV Push every 2 hours PRN Severe Pain (7 - 10)  HYDROmorphone  Injectable 1.5 milliGRAM(s) IV Push every 2 hours PRN Severe Pain (7 - 10)  magnesium hydroxide Suspension 30 milliLiter(s) Oral daily PRN Constipation  ondansetron Injectable 4 milliGRAM(s) IV Push every 6 hours PRN Nausea and/or Vomiting  oxyCODONE    IR 10 milliGRAM(s) Oral every 4 hours PRN Severe Pain (7 - 10)  oxyCODONE    IR 5 milliGRAM(s) Oral every 4 hours PRN Moderate Pain (4 - 6)          VITALS:   T(C): 36.7 (08-04-22 @ 09:58), Max: 36.9 (08-03-22 @ 22:10)  HR: 96 (08-04-22 @ 09:58) (93 - 111)  BP: 130/79 (08-04-22 @ 09:58) (111/74 - 152/70)  RR: 16 (08-04-22 @ 09:58) (15 - 18)  SpO2: 94% (08-04-22 @ 09:58) (94% - 100%)  Wt(kg): --    PHYSICAL EXAM:  GENERAL: NAD, well-groomed, well-developed  HEAD:  Atraumatic, Normocephalic  EYES: EOMI, PERRLA, conjunctiva and sclera clear  ENMT: No tonsillar erythema, exudates, or enlargement; Moist mucous membranes, Good dentition, No lesions  NECK: Supple, No JVD, Normal thyroid  NERVOUS SYSTEM:  Alert & Oriented X3, Good concentration;   CHEST/LUNG: Clear to percussion bilaterally; No rales, rhonchi, wheezing, or rubs  HEART: Regular rate and rhythm; No murmurs, rubs, or gallops  ABDOMEN: Soft, Nontender, Nondistended; Bowel sounds present  EXTREMITIES:  2+ Peripheral Pulses, No clubbing, cyanosis, or edema  LYMPH: No lymphadenopathy noted  SKIN: No rashes or lesions    LABS:  ABG - ( 03 Aug 2022 17:12 )  pH, Arterial: 7.31  pH, Blood: x     /  pCO2: 44    /  pO2: 215   / HCO3: 22    / Base Excess: -3.9  /  SaO2: 99.9                  CBC Full  -  ( 04 Aug 2022 09:48 )  WBC Count : 14.85 K/uL  RBC Count : 3.05 M/uL  Hemoglobin : 9.0 g/dL  Hematocrit : 28.0 %  Platelet Count - Automated : 347 K/uL  Mean Cell Volume : 91.8 fl  Mean Cell Hemoglobin : 29.5 pg  Mean Cell Hemoglobin Concentration : 32.1 gm/dL  Auto Neutrophil # : x  Auto Lymphocyte # : x  Auto Monocyte # : x  Auto Eosinophil # : x  Auto Basophil # : x  Auto Neutrophil % : x  Auto Lymphocyte % : x  Auto Monocyte % : x  Auto Eosinophil % : x  Auto Basophil % : x    08-04    135  |  99  |  18  ----------------------------<  106<H>  4.0   |  22  |  0.37<L>    Ca    7.8<L>      04 Aug 2022 09:48        PT/INR - ( 03 Aug 2022 04:58 )   PT: 17.8 sec;   INR: 1.53 ratio         PTT - ( 03 Aug 2022 04:58 )  PTT:26.3 sec    CAPILLARY BLOOD GLUCOSE          RADIOLOGY & ADDITIONAL TESTS:

## 2022-08-04 NOTE — PHYSICAL THERAPY INITIAL EVALUATION ADULT - GAIT DEVIATIONS NOTED, PT EVAL
antalgic, shuffling/decreased antoine/increased time in double stance/decreased step length/decreased stride length/decreased swing-to-stance ratio/decreased weight-shifting ability
antalgic/decreased antoine/increased time in double stance/decreased step length/decreased stride length/decreased swing-to-stance ratio/decreased weight-shifting ability

## 2022-08-04 NOTE — PROGRESS NOTE ADULT - SUBJECTIVE AND OBJECTIVE BOX
62 years old female transferred from other hospital in [place] with diagnosis of metastatic lung carcinoma. for the last several month patient were complaining about progressive right hip pain xray demonstrated a large radiolucent metastatic lesion to the right acetabulum destroying the entire acetabulum the lesion has soft tissue extension into the lower pelvis in a patient with multifocal metastatic disease. As a palliative procedure patient recommended for wide resection and replacement by total hip replacement. Yesterday the surgical procedure was done. The acetabulum was reconstructed by special GAP cap and total hip prosthesis.

## 2022-08-04 NOTE — PHYSICAL THERAPY INITIAL EVALUATION ADULT - ADDITIONAL COMMENTS
Pt's  purchased RW prior to admission 2/2 pt's inability to ambulate for 2-3 days pre-admission.
Pt's  purchased RW prior to admission 2/2 pt's inability to ambulate for 2-3 days pre-admission.

## 2022-08-04 NOTE — OCCUPATIONAL THERAPY INITIAL EVALUATION ADULT - ADDITIONAL COMMENTS
CT ABDOMEN/PELVIS: Large soft tissue mass with gross destruction of the right acetabulum, and inferior and superior pubic rami highly concerning for malignancy. The soft tissue mass is inseparable from a small portion the right side of the bladder and invasion cannot be excluded.. Lytic lesion in L1 vertebral body. Multiple bilateral pleural nodules highly concerning for metastatic disease. Moderate right pleural effusion with mild posterior pleural thickening. Pleural-based metastases cannot be excluded. Right external iliac lymph node raises concern for metastatic disease. Gross distention of the ascending colon. There is a large amount of stool and this may be due to fecal impaction. Patrick syndrome would be considered if fecal impaction is relieved and dilatation persisted. Mild bilateral hydronephrosis which may be due to the very distended bladder.

## 2022-08-04 NOTE — PHYSICAL THERAPY INITIAL EVALUATION ADULT - PLANNED THERAPY INTERVENTIONS, PT EVAL
Stair training... GOAL: In 4 weeks pt will negotiate 16 stairs independently with least restrictive device./balance training/bed mobility training/gait training/strengthening/transfer training
Stair training... GOAL: In 4 weeks pt will negotiate 2 flights of stairs independently with least restrictive device./balance training/bed mobility training/gait training/strengthening/transfer training

## 2022-08-04 NOTE — PROGRESS NOTE ADULT - SUBJECTIVE AND OBJECTIVE BOX
Patient is a 62y Female     Patient is a 62y old  Female who presents with a chief complaint of hip fracture (03 Aug 2022 08:44)      HPI:  62y Female presents as direct transfer from [facility], c/o R hip pain that begain in May, then this past weekend was unable to bear weight and went to nearby hospital, where she was found to have R acetabular lesion, lung lesions and brain lesions per pt. R hip lesion was biopsied at prior hospital and consistent with metastatic lung cancer. Patient denies numbness. Denies trauma. Patient denies headstrike or LOC. Patient denies radiation of pain. Patient denies numbness/tingling/burning in the RLE. No other bone/joint complaints. Patient is a community ambulator at baseline without assistive devices.    PAST MEDICAL & SURGICAL HISTORY:    MEDICATIONS  (STANDING):  acetaminophen     Tablet .. 975 milliGRAM(s) Oral every 8 hours  enoxaparin Injectable 40 milliGRAM(s) SubCutaneous every 24 hours  senna 2 Tablet(s) Oral at bedtime  sodium chloride 0.9%. 1000 milliLiter(s) (75 mL/Hr) IV Continuous <Continuous>    MEDICATIONS  (PRN):  HYDROmorphone  Injectable 0.5 milliGRAM(s) IV Push every 6 hours PRN Severe Pain (7 - 10)  magnesium hydroxide Suspension 30 milliLiter(s) Oral daily PRN Constipation  melatonin 3 milliGRAM(s) Oral at bedtime PRN Insomnia  oxyCODONE    IR 2.5 milliGRAM(s) Oral every 4 hours PRN Moderate Pain (4 - 6)  oxyCODONE    IR 5 milliGRAM(s) Oral every 4 hours PRN Severe Pain (7 - 10)    Allergies    No Known Allergies    Intolerances                    T(C): 36.7 (07-28-22 @ 23:04), Max: 36.7 (07-28-22 @ 23:04)  HR: 114 (07-28-22 @ 23:04) (114 - 114)  BP: 126/87 (07-28-22 @ 23:04) (126/87 - 126/87)  RR: 18 (07-28-22 @ 23:04) (18 - 18)  SpO2: 93% (07-28-22 @ 23:04) (93% - 93%)  Wt(kg): --    PE   RLE:  Skin intact; No ecchymosis/soft tissue swelling  Compartments soft; + TTP about hip. No TTP to knee/leg/ankle/foot   ROM lmited 2/2 pain   Unable to SLR;   Motor intact GS/TA/FHL/EHL  SILT L2-S1  +dp    LLE/BUE:   No bony TTP; Good ROM w/o pain; Exam Unremarkable    Imaging:  XR and CT showing R acetabular lesion    62y Female with R acetabular lesion    - PWB RLE with rolling walker  - Pain control  - FU labs   - FU medicine and heme onc clearance  - Lovenox for dvt ppx  - Plan for OR Wednesday 8/3   (28 Jul 2022 23:54)      PAST MEDICAL & SURGICAL HISTORY:      MEDICATIONS  (STANDING):  enoxaparin Injectable 40 milliGRAM(s) SubCutaneous every 24 hours  HYDROmorphone PCA (1 mG/mL) 30 milliLiter(s) PCA Continuous PCA Continuous  multivitamin 1 Tablet(s) Oral daily  pantoprazole    Tablet 40 milliGRAM(s) Oral before breakfast  polyethylene glycol 3350 17 Gram(s) Oral at bedtime  senna 2 Tablet(s) Oral at bedtime      Allergies    No Known Allergies    Intolerances        SOCIAL HISTORY:  Denies ETOh,Smoking,     FAMILY HISTORY:      REVIEW OF SYSTEMS:    CONSTITUTIONAL: No weakness, fevers or chills  EYES/ENT: No visual changes;  No vertigo or throat pain   NECK: No pain or stiffness  RESPIRATORY: No cough, wheezing, hemoptysis; No shortness of breath  CARDIOVASCULAR: No chest pain or palpitations  GASTROINTESTINAL: No abdominal or epigastric pain. No nausea, vomiting, or hematemesis; No diarrhea or constipation. No melena or hematochezia.  GENITOURINARY: No dysuria, frequency or hematuria  NEUROLOGICAL: No numbness or weakness  SKIN: No itching, burning, rashes, or lesions   All other review of systems is negative unless indicated above.    VITAL:  T(C): , Max: 36.9 (08-03-22 @ 11:31)  T(F): , Max: 98.4 (08-03-22 @ 11:31)  HR: 98 (08-04-22 @ 04:00)  BP: 117/76 (08-04-22 @ 04:00)  BP(mean): 99 (08-03-22 @ 20:15)  RR: 16 (08-04-22 @ 04:00)  SpO2: 96% (08-04-22 @ 04:00)  Wt(kg): --    I and O's:    08-02 @ 07:01  -  08-03 @ 07:00  --------------------------------------------------------  IN: 600 mL / OUT: 750 mL / NET: -150 mL    08-03 @ 07:01  -  08-04 @ 07:00  --------------------------------------------------------  IN: 2020 mL / OUT: 785 mL / NET: 1235 mL      Height (cm): 157.5 (08-03 @ 12:16)  Weight (kg): 55.8 (08-03 @ 12:16)  BMI (kg/m2): 22.5 (08-03 @ 12:16)  BSA (m2): 1.55 (08-03 @ 12:16)    PHYSICAL EXAM:    Constitutional: NAD  HEENT: PERRLA,   Neck: No JVD  Respiratory: CTA B/L  Cardiovascular: S1 and S2  Gastrointestinal: BS+, soft, NT/ND  Extremities: No peripheral edema  Neurological: A/O x 3, no focal deficits  Psychiatric: Normal mood, normal affect  : No Gimenez  Skin: No rashes  Access: Not applicable  Back: No CVA tenderness    LABS:                        9.8    11.61 )-----------( 316      ( 03 Aug 2022 18:27 )             29.6     08-03    137  |  100  |  21  ----------------------------<  163<H>  3.7   |  37<H>  |  0.42<L>    Ca    7.7<L>      03 Aug 2022 18:27            RADIOLOGY & ADDITIONAL STUDIES:

## 2022-08-04 NOTE — PHYSICAL THERAPY INITIAL EVALUATION ADULT - TRANSFER SAFETY CONCERNS NOTED: SIT/STAND, REHAB EVAL
decreased balance during turns/decreased step length/decreased weight-shifting ability
decreased balance during turns/decreased step length/decreased weight-shifting ability

## 2022-08-04 NOTE — PROGRESS NOTE ADULT - ASSESSMENT
61F w/ met lung cancer found to have R acetabular lesion and mild activity on R proximal femur on bone scan. S/p R mass excision and complex AMANDEEP, POD#1, has Provena and HMV. Required 2U pRBCs intra-op, 1U pRBCs post-op. Pain is currently well controlled.    -F/u AM labs, transfuse as needed  -WBAT, OOB in AM  -Anterior hip precautions  -Provena until POD#5  -DVT ppx- Lovenox  -Pain control w PCA  -Appreciate hem/onc recs  -Appreciate medicine following  -Appreciate GI recs

## 2022-08-04 NOTE — PROGRESS NOTE ADULT - PROBLEM SELECTOR PLAN 1
PCA for postop pain D/C by anesthesia toady.     Recommend:  Resumed fentanyl patch at 50mcg/72h  Ordered IV dilaudid 1mg q2h PRN moderate pain  Ordered IV dilaudid 1.5mg q2h PRN severe pain  -Based on PRN use will transition back to PO regimen  Continue bowel regimen  Narcan PRN

## 2022-08-04 NOTE — PHYSICAL THERAPY INITIAL EVALUATION ADULT - DID THE PATIENT HAVE SURGERY?
s/p R acetabular mass excision, right complex total hip replacement on 8/3. POD#1, has Provena and HMV. Required 2U pRBCs intra-op, 1U pRBCs post-op/yes

## 2022-08-04 NOTE — PHYSICAL THERAPY INITIAL EVALUATION ADULT - LEVEL OF INDEPENDENCE: GAIT, REHAB EVAL
minimum assist (75% patients effort)/moderate assist (50% patients effort)
moderate assist (50% patients effort)

## 2022-08-04 NOTE — PROGRESS NOTE ADULT - SUBJECTIVE AND OBJECTIVE BOX
INTERVAL HPI/OVERNIGHT EVENTS:  Patient S&E at bedside. She is s/p surgery yesterday. No complaints at this time other than R hip pain which is controlled. No F/C, CP, SOB, abdominal pain, N/V, rash, or edema      VITAL SIGNS:  T(F): 98 (08-04-22 @ 09:58)  HR: 96 (08-04-22 @ 09:58)  BP: 130/79 (08-04-22 @ 09:58)  RR: 16 (08-04-22 @ 09:58)  SpO2: 94% (08-04-22 @ 09:58)  Wt(kg): --    PHYSICAL EXAM:    Constitutional: NAD  Eyes: EOMI, sclera non-icteric  Neck: supple  Respiratory: no increased WOB, CTAB/L  Cardiovascular: RRR, S1, S2, no m/g/r  Gastrointestinal: soft, NTND, no masses palpable, no HSM  Extremities: R hip with bandage and post-surgical serosanguinous drainage. RLE edema.  Neurological: AAOx3    MEDICATIONS  (STANDING):  enoxaparin Injectable 40 milliGRAM(s) SubCutaneous every 24 hours  fentaNYL   Patch  50 MICROgram(s)/Hr 1 Patch Transdermal every 72 hours  multivitamin 1 Tablet(s) Oral daily  pantoprazole    Tablet 40 milliGRAM(s) Oral before breakfast  polyethylene glycol 3350 17 Gram(s) Oral at bedtime  senna 2 Tablet(s) Oral at bedtime    MEDICATIONS  (PRN):  aluminum hydroxide/magnesium hydroxide/simethicone Suspension 30 milliLiter(s) Oral four times a day PRN Indigestion  HYDROmorphone  Injectable 1 milliGRAM(s) IV Push every 2 hours PRN Moderate Pain (4 - 6)  HYDROmorphone  Injectable 1.5 milliGRAM(s) IV Push every 2 hours PRN Severe Pain (7 - 10)  HYDROmorphone  Injectable 1.5 milliGRAM(s) IV Push every 2 hours PRN Severe Pain (7 - 10)  magnesium hydroxide Suspension 30 milliLiter(s) Oral daily PRN Constipation  ondansetron Injectable 4 milliGRAM(s) IV Push every 6 hours PRN Nausea and/or Vomiting  oxyCODONE    IR 10 milliGRAM(s) Oral every 4 hours PRN Severe Pain (7 - 10)  oxyCODONE    IR 5 milliGRAM(s) Oral every 4 hours PRN Moderate Pain (4 - 6)      LABS:                        9.0    14.85 )-----------( 347      ( 04 Aug 2022 09:48 )             28.0     08-04    135  |  99  |  18  ----------------------------<  106<H>  4.0   |  22  |  0.37<L>    Ca    7.8<L>      04 Aug 2022 09:48      PT/INR - ( 03 Aug 2022 04:58 )   PT: 17.8 sec;   INR: 1.53 ratio         PTT - ( 03 Aug 2022 04:58 )  PTT:26.3 sec      RADIOLOGY & ADDITIONAL TESTS:

## 2022-08-04 NOTE — PROGRESS NOTE ADULT - PROBLEM SELECTOR PLAN 2
continue fentanyl patch Q72h  Dilaudid q6h for breakthrough  May need to increase the frequency  pain management to see Patient   Patient states she was taking Dilaudid IV 2mg q6 while in [facility] without incident

## 2022-08-04 NOTE — PROGRESS NOTE ADULT - ASSESSMENT
61 yo F with metastatic lung cancer to bone and brain p/w R pathologic hip fracture. Pt s/p wide resection and total hip replacement on 8/3. Palliative following for pain management.

## 2022-08-04 NOTE — OCCUPATIONAL THERAPY INITIAL EVALUATION ADULT - RANGE OF MOTION EXAMINATION, LOWER EXTREMITY
RLE within anterior/posterior hip precautions/bilateral LE Passive ROM was WFL  (within functional limits)

## 2022-08-05 NOTE — PROGRESS NOTE ADULT - SUBJECTIVE AND OBJECTIVE BOX
SUBJECTIVE AND OBJECTIVE: pt resting in bed comfortably  Indication for Geriatrics and Palliative Care Services/INTERVAL HPI:  Palliative care called for pain management. Or 8/3 for total hip.    OVERNIGHT EVENTS:  pt required  6 dosed of dilaudid in 24 hrs    DNR on chart:  Allergies    No Known Allergies    Intolerances    MEDICATIONS  (STANDING):  enoxaparin Injectable 40 milliGRAM(s) SubCutaneous every 24 hours  fentaNYL   Patch  50 MICROgram(s)/Hr 1 Patch Transdermal every 72 hours  multivitamin 1 Tablet(s) Oral daily  pantoprazole    Tablet 40 milliGRAM(s) Oral before breakfast  polyethylene glycol 3350 17 Gram(s) Oral at bedtime  senna 2 Tablet(s) Oral at bedtime    MEDICATIONS  (PRN):  aluminum hydroxide/magnesium hydroxide/simethicone Suspension 30 milliLiter(s) Oral four times a day PRN Indigestion  HYDROmorphone  Injectable 1 milliGRAM(s) IV Push every 2 hours PRN Moderate Pain (4 - 6)  HYDROmorphone  Injectable 1.5 milliGRAM(s) IV Push every 2 hours PRN Severe Pain (7 - 10)  magnesium hydroxide Suspension 30 milliLiter(s) Oral daily PRN Constipation  ondansetron Injectable 4 milliGRAM(s) IV Push every 6 hours PRN Nausea and/or Vomiting      ITEMS UNCHECKED ARE NOT PRESENT    PRESENT SYMPTOMS: [ ]Unable to self-report - see [ ] CPOT [ ] PAINADS [ ] RDOS  Source if other than patient:  [ ]Family   [ ]Team     Pain:  [x ]yes [ ]no  QOL impact -   Location -      R hip and leg              Aggravating factors - movement  Quality - sharp  Radiation - down leg  Timing- with movement  Severity (0-10 scale):  Minimal acceptable level (0-10 scale):     CPOT:    https://www.[facility].org/getattachment/vng28g96-0k1t-6e2v-0w7w-5130x7153q3g/Critical-Care-Pain-Observation-Tool-(CPOT)    PAIN AD Score:	  [email or web address]/painad.pdf (Ctrl + left click to view)    Dyspnea:                           [ ]Mild [ ]Moderate [ ]Severe    RDOS:  0 to 2  minimal or no respiratory distress   3  mild distress  4 to 6 moderate distress  >7 severe distress  https://homecareinformation.[email or web address]/handouts/hen/Respiratory_Distress_Observation_Scale.pdf (Ctrl +  left click to view)     Anxiety:                             [x ]Mild [ ]Moderate [ ]Severe  Fatigue:                             [ ]Mild [ ]Moderate [ ]Severe  Nausea:                             [ ]Mild [ ]Moderate [ ]Severe  Loss of appetite:              [ ]Mild [ ]Moderate [ ]Severe  Constipation:                    [ ]Mild [ ]Moderate [ ]Severe    PCSSQ[Palliative Care Spiritual Screening Question]   Severity (0-10):  Chaplaincy Referral: [ ] yes [ ] refused [ ] following    Other Symptoms:  [x ]All other review of systems negative     Palliative Performance Status Version 2:    50-60     %      http://[email or web address]/files/[email or web address]_performance_[email or web address]_[email or web address].pdf  PHYSICAL EXAM:  Vital Signs Last 24 Hrs  T(C): 36.7 (05 Aug 2022 05:10), Max: 37.1 (04 Aug 2022 13:57)  T(F): 98 (05 Aug 2022 05:10), Max: 98.8 (04 Aug 2022 13:57)  HR: 111 (05 Aug 2022 05:10) (96 - 111)  BP: 119/79 (05 Aug 2022 05:10) (118/82 - 136/77)  BP(mean): --  RR: 18 (05 Aug 2022 05:10) (16 - 18)  SpO2: 95% (05 Aug 2022 05:10) (94% - 97%)    Parameters below as of 05 Aug 2022 05:10  Patient On (Oxygen Delivery Method): room air     I&O's Summary    04 Aug 2022 07:01  -  05 Aug 2022 07:00  --------------------------------------------------------  IN: 780 mL / OUT: 705 mL / NET: 75 mL       GENERAL: [ ]Cachexia    [x ]Alert  [x ]Oriented x   [ ]Lethargic  [ ]Unarousable  [ ]Verbal  [ ]Non-Verbal  Behavioral:   [ ]Anxiety  [ ]Delirium [ ]Agitation [ ]Other  HEENT:  [x ]Normal   [ ]Dry mouth   [ ]ET Tube/Trach  [ ]Oral lesions  PULMONARY:   [x ]Clear [ ]Tachypnea  [ ]Audible excessive secretions   [ ]Rhonchi        [ ]Right [ ]Left [ ]Bilateral  [ ]Crackles        [ ]Right [ ]Left [ ]Bilateral  [ ]Wheezing     [ ]Right [ ]Left [ ]Bilateral  [ ]Diminished BS [ ] Right [ ]Left [ ]Bilateral  CARDIOVASCULAR:    [x ]Regular [ ]Irregular [x ]Tachy  [ ]Blaine [ ]Murmur [ ]Other  GASTROINTESTINAL:  [ x]Soft  [ ]Distended   x[ ]+BS  [ x]Non tender [ ]Tender  [ ]Other [ ]PEG [ ]OGT/ NGT   Last BM:   GENITOURINARY:  [ x]Normal [ ]Incontinent   [ ]Oliguria/Anuria   [ ]Gimenez  MUSCULOSKELETAL:   [ x]Normal   [ ]Weakness  [ ]Bed/Wheelchair bound [ ]Edema  NEUROLOGIC:   [ x]No focal deficits  [ ] Cognitive impairment  [ ] Dysphagia [ ]Dysarthria [ ] Paresis [ ]Other   SKIN:   [ x]Normal  [ ]Rash  [ ]Other  [ ]Pressure ulcer(s) [ ]y [ ]n present on admission    CRITICAL CARE:  [ ]Shock Present  [ ]Septic [ ]Cardiogenic [ ]Neurologic [ ]Hypovolemic  [ ]Vasopressors [ ]Inotropes  [ ]Respiratory failure present [ ]Mechanical Ventilation [ ]Non-invasive ventilatory support [ ]High-Flow   [ ]Acute  [ ]Chronic [ ]Hypoxic  [ ]Hypercarbic [ ]Other  [ ]Other organ failure     LABS:                        9.1    21.21 )-----------( 385      ( 05 Aug 2022 05:58 )             28.4   08-05    134<L>  |  97  |  14  ----------------------------<  124<H>  3.8   |  26  |  0.41<L>    Ca    7.8<L>      05 Aug 2022 05:58          RADIOLOGY & ADDITIONAL STUDIES:  < from: CT Abdomen and Pelvis No Cont (07.30.22 @ 15:45) >  ACC: [id number] EXAM:  CT ABDOMEN AND PELVIS                          PROCEDURE DATE:  07/30/2022          INTERPRETATION:  CLINICAL INFORMATION: Right acetabular lesion with   nausea and vomiting. Evaluate for Patrick syndrome.    COMPARISON: CT scan of the abdomen and pelvis from 11/20/2019    CONTRAST/COMPLICATIONS:  IV Contrast: NONE. This limits evaluation of vascular structures.  Oral Contrast: NONE  Complications: None reported at time of study completion    PROCEDURE:  CT of the Abdomen and Pelvis was performed.  Sagittal and coronal reformats were performed.    FINDINGS:  LOWER CHEST: Moderate right pleural effusion not seen previously. Mild   right pleural thickening posteriorly. This is limited in evaluation on   this noncontrast CT scan. Pleural-based metastases cannot be excluded.   Multiple small pulmonary nodules bilaterally. For example, 7 mm in the   right middle lobe on series 3 image 17 and 5 mm in the left lower lobe on   series 3 image 28 which were not seen previously. Partial atelectasis of   the right lower lobe with air bronchograms.    LIVER: 2.4 cm hepatic cyst in segment 2 is stable.  BILE DUCTS: Normal caliber.  GALLBLADDER: Cholecystectomy clips.  SPLEEN: Within normal limits.  PANCREAS: Within normal limits.  ADRENALS: Fullness in the adrenal glands bilaterally. This was seen on   the old study. Please correlate clinically.  KIDNEYS/URETERS: 1.5 cm hyperdense cyst in the upper pole the right   kidney and 6 mm hyperdense cyst in the upper pole the left kidney. Mild   bilateral hydroureteronephrosis to the level of the very distended   bladder.    BLADDER: Air is very distended.  REPRODUCTIVE ORGANS: Grossly unremarkable.    BOWEL: Large amount of stool in the ascending colon. Descending colon is   very distended measuring up to 8.2 cm in the axial plane and   approximately 8.7 cm in the sagittal plane.. Appendix within normal   limits.  PERITONEUM: No ascites.  VESSELS: Mild vascular calcifications.  RETROPERITONEUM/LYMPH NODES: Right externaliliac lymph node measuring   2.3 x 1.2 cm on series 3 image 127.  ABDOMINAL WALL: Small umbilical hernia containing fat.  BONES: Large soft tissue mass with gross destruction of the right   acetabulum, and inferior and superior pubic rami, highly concerning for   malignancy. This is extending to the femoral head although I do not see   gross destruction of the femoral head. The soft tissue mass is   inseparable from a small portion of the right side of the bladder. There   is moderate fluid anterior to the right hip. Lytic lesion in the right   side of L1 vertebral body. These were not seen on the prior study.   Sclerotic densities in the left acetabulum are stable and may represent   bone islands.    IMPRESSION:  Large soft tissue mass with gross destruction of the right acetabulum,   and inferior and superior pubic rami highly concerning for malignancy.   The soft tissue mass is inseparable from a small portion the right side   of the bladder and invasion cannot be excluded.. Lytic lesion in L1   vertebral body. Multiple bilateral pleural nodules highly concerning for   metastatic disease. Moderate right pleural effusion with mild posterior   pleural thickening.. Pleural-based metastases cannot be excluded. Right   external iliac lymph node raises concern for metastatic disease.    Gross distention of the ascending colon. There is a large amount of stool   and this may be due to fecal impaction. Patrick syndrome would be   considered if fecal impaction is relieved and dilatationpersisted.   Please correlate clinically.    Mild bilateral hydronephrosis which may be due to the very distended   bladder.        --- End of Report ---            NANIE MENJIVAR MD; Attending Radiologist  This document has been electronically signed. Jul 30 2022  5:12PM    < end of copied text >      Protein Calorie Malnutrition Present: [ ]mild [ ]moderate [ ]severe [ ]underweight [ ]morbid obesity  https://www.andeal.org/vault/2440/web/files/ONC/Table_Clinical%20Characteristics%20to%20Document%20Malnutrition-White%20JV%20et%20al%202012.pdf    Height (cm): 157.5 (08-03-22 @ 12:16)  Weight (kg): 55.8 (08-03-22 @ 12:16)  BMI (kg/m2): 22.5 (08-03-22 @ 12:16)    [ ]PPSV2 < or = 30%  [ ]significant weight loss [ ]poor nutritional intake [ ]anasarca[ ]Artificial Nutrition    Other REFERRALS:  [ ]Hospice  [ ]Child Life  [ ]Social Work  [ ]Case management [ ]Holistic Therapy     Goals of Care Document:

## 2022-08-05 NOTE — PROGRESS NOTE ADULT - ASSESSMENT
63 yo F with metastatic lung cancer to bone and brain p/w R pathologic hip fracture. Pt s/p wide resection and total hip replacement on 8/3. Palliative following for pain management.

## 2022-08-05 NOTE — PROGRESS NOTE ADULT - SUBJECTIVE AND OBJECTIVE BOX
Day two post major [id number] right acetabulum and reconstruction using Gap cap and restoration total  hip replacement. stable comfortable out of bed mad few stapes Hemovac in place

## 2022-08-05 NOTE — PROGRESS NOTE ADULT - SUBJECTIVE AND OBJECTIVE BOX
Patient is a 62y Female     Patient is a 62y old  Female who presents with a chief complaint of abnormal ct (04 Aug 2022 07:32)      HPI:  62y Female presents as direct transfer from [facility], c/o R hip pain that begain in May, then this past weekend was unable to bear weight and went to nearby hospital, where she was found to have R acetabular lesion, lung lesions and brain lesions per pt. R hip lesion was biopsied at prior hospital and consistent with metastatic lung cancer. Patient denies numbness. Denies trauma. Patient denies headstrike or LOC. Patient denies radiation of pain. Patient denies numbness/tingling/burning in the RLE. No other bone/joint complaints. Patient is a community ambulator at baseline without assistive devices.    PAST MEDICAL & SURGICAL HISTORY:    MEDICATIONS  (STANDING):  acetaminophen     Tablet .. 975 milliGRAM(s) Oral every 8 hours  enoxaparin Injectable 40 milliGRAM(s) SubCutaneous every 24 hours  senna 2 Tablet(s) Oral at bedtime  sodium chloride 0.9%. 1000 milliLiter(s) (75 mL/Hr) IV Continuous <Continuous>    MEDICATIONS  (PRN):  HYDROmorphone  Injectable 0.5 milliGRAM(s) IV Push every 6 hours PRN Severe Pain (7 - 10)  magnesium hydroxide Suspension 30 milliLiter(s) Oral daily PRN Constipation  melatonin 3 milliGRAM(s) Oral at bedtime PRN Insomnia  oxyCODONE    IR 2.5 milliGRAM(s) Oral every 4 hours PRN Moderate Pain (4 - 6)  oxyCODONE    IR 5 milliGRAM(s) Oral every 4 hours PRN Severe Pain (7 - 10)    Allergies    No Known Allergies    Intolerances                    T(C): 36.7 (07-28-22 @ 23:04), Max: 36.7 (07-28-22 @ 23:04)  HR: 114 (07-28-22 @ 23:04) (114 - 114)  BP: 126/87 (07-28-22 @ 23:04) (126/87 - 126/87)  RR: 18 (07-28-22 @ 23:04) (18 - 18)  SpO2: 93% (07-28-22 @ 23:04) (93% - 93%)  Wt(kg): --    PE   RLE:  Skin intact; No ecchymosis/soft tissue swelling  Compartments soft; + TTP about hip. No TTP to knee/leg/ankle/foot   ROM lmited 2/2 pain   Unable to SLR;   Motor intact GS/TA/FHL/EHL  SILT L2-S1  +dp    LLE/BUE:   No bony TTP; Good ROM w/o pain; Exam Unremarkable    Imaging:  XR and CT showing R acetabular lesion    62y Female with R acetabular lesion    - PWB RLE with rolling walker  - Pain control  - FU labs   - FU medicine and heme onc clearance  - Lovenox for dvt ppx  - Plan for OR Wednesday 8/3   (28 Jul 2022 23:54)      PAST MEDICAL & SURGICAL HISTORY:      MEDICATIONS  (STANDING):  enoxaparin Injectable 40 milliGRAM(s) SubCutaneous every 24 hours  fentaNYL   Patch  50 MICROgram(s)/Hr 1 Patch Transdermal every 72 hours  multivitamin 1 Tablet(s) Oral daily  pantoprazole    Tablet 40 milliGRAM(s) Oral before breakfast  polyethylene glycol 3350 17 Gram(s) Oral at bedtime  senna 2 Tablet(s) Oral at bedtime      Allergies    No Known Allergies    Intolerances        SOCIAL HISTORY:  Denies ETOh,Smoking,     FAMILY HISTORY:      REVIEW OF SYSTEMS:    CONSTITUTIONAL: No weakness, fevers or chills  EYES/ENT: No visual changes;  No vertigo or throat pain   NECK: No pain or stiffness  RESPIRATORY: No cough, wheezing, hemoptysis; No shortness of breath  CARDIOVASCULAR: No chest pain or palpitations  GASTROINTESTINAL: No abdominal or epigastric pain. No nausea, vomiting, or hematemesis; No diarrhea or constipation. No melena or hematochezia.  GENITOURINARY: No dysuria, frequency or hematuria  NEUROLOGICAL: No numbness or weakness  SKIN: No itching, burning, rashes, or lesions   All other review of systems is negative unless indicated above.    VITAL:  T(C): , Max: 37.1 (08-04-22 @ 13:57)  T(F): , Max: 98.8 (08-04-22 @ 13:57)  HR: 111 (08-05-22 @ 05:10)  BP: 119/79 (08-05-22 @ 05:10)  BP(mean): --  RR: 18 (08-05-22 @ 05:10)  SpO2: 95% (08-05-22 @ 05:10)  Wt(kg): --    I and O's:    08-02 @ 07:01  -  08-03 @ 07:00  --------------------------------------------------------  IN: 600 mL / OUT: 750 mL / NET: -150 mL    08-03 @ 07:01  -  08-04 @ 07:00  --------------------------------------------------------  IN: 2020 mL / OUT: 785 mL / NET: 1235 mL    08-04 @ 07:01  -  08-05 @ 06:51  --------------------------------------------------------  IN: 780 mL / OUT: 705 mL / NET: 75 mL          PHYSICAL EXAM:    Constitutional: NAD  HEENT: PERRLA,   Neck: No JVD  Respiratory: CTA B/L  Cardiovascular: S1 and S2  Gastrointestinal: BS+, soft, NT/ND  Extremities: No peripheral edema  Neurological: A/O x 3, no focal deficits  Psychiatric: Normal mood, normal affect  : No Gimenez  Skin: No rashes  Access: Not applicable  Back: No CVA tenderness    LABS:                        9.1    21.21 )-----------( 385      ( 05 Aug 2022 05:58 )             28.4     08-05    134<L>  |  97  |  14  ----------------------------<  124<H>  3.8   |  26  |  0.41<L>    Ca    7.8<L>      05 Aug 2022 05:58            RADIOLOGY & ADDITIONAL STUDIES:

## 2022-08-05 NOTE — PROGRESS NOTE ADULT - PROBLEM SELECTOR PLAN 2
continue fentanyl patch Q72h  Dilaudid q6h for breakthrough  May need to increase the frequency  pain management to see Patient   Patient states she was taking Dilaudid IV 2mg q6 while in [facility] without incident

## 2022-08-05 NOTE — PROGRESS NOTE ADULT - ASSESSMENT
61 yo woman with recently diagnosed lung CA with meds to brain and bone presents with a right pathologic hip fracture s/p an Open reduction and internal fixation

## 2022-08-05 NOTE — PROGRESS NOTE ADULT - SUBJECTIVE AND OBJECTIVE BOX
ORTHOPAEDICS DAILY PROGRESS NOTE:       SUBJECTIVE/ROS: POD2 . Seen and examined. Pain better controlled. Has been working with Pt. Took steps yesterday. Sat OOB in chair for 2 hours. Denies CP/SOB/N/V.         MEDICATIONS  (STANDING):  enoxaparin Injectable 40 milliGRAM(s) SubCutaneous every 24 hours  fentaNYL   Patch  50 MICROgram(s)/Hr 1 Patch Transdermal every 72 hours  multivitamin 1 Tablet(s) Oral daily  pantoprazole    Tablet 40 milliGRAM(s) Oral before breakfast  polyethylene glycol 3350 17 Gram(s) Oral at bedtime  senna 2 Tablet(s) Oral at bedtime    MEDICATIONS  (PRN):  aluminum hydroxide/magnesium hydroxide/simethicone Suspension 30 milliLiter(s) Oral four times a day PRN Indigestion  HYDROmorphone  Injectable 1 milliGRAM(s) IV Push every 2 hours PRN Moderate Pain (4 - 6)  HYDROmorphone  Injectable 1.5 milliGRAM(s) IV Push every 2 hours PRN Severe Pain (7 - 10)  magnesium hydroxide Suspension 30 milliLiter(s) Oral daily PRN Constipation  ondansetron Injectable 4 milliGRAM(s) IV Push every 6 hours PRN Nausea and/or Vomiting      OBJECTIVE:    Vital Signs Last 24 Hrs  T(C): 36.7 (05 Aug 2022 05:10), Max: 37.1 (04 Aug 2022 13:57)  T(F): 98 (05 Aug 2022 05:10), Max: 98.8 (04 Aug 2022 13:57)  HR: 111 (05 Aug 2022 05:10) (96 - 111)  BP: 119/79 (05 Aug 2022 05:10) (118/82 - 136/77)  BP(mean): --  RR: 18 (05 Aug 2022 05:10) (16 - 18)  SpO2: 95% (05 Aug 2022 05:10) (94% - 97%)    Parameters below as of 05 Aug 2022 05:10  Patient On (Oxygen Delivery Method): room air        I&O's Detail    03 Aug 2022 07:01  -  04 Aug 2022 07:00  --------------------------------------------------------  IN:    IV PiggyBack: 100 mL    Lactated Ringers Bolus: 1500 mL    Oral Fluid: 120 mL    PRBCs (Packed Red Blood Cells): 300 mL  Total IN: 2020 mL    OUT:    Accordian (mL): 340 mL    Indwelling Catheter - Urethral (mL): 445 mL  Total OUT: 785 mL    Total NET: 1235 mL      04 Aug 2022 07:01  -  05 Aug 2022 06:02  --------------------------------------------------------  IN:    Oral Fluid: 480 mL  Total IN: 480 mL    OUT:    Accordian (mL): 255 mL    Indwelling Catheter - Urethral (mL): 450 mL  Total OUT: 705 mL    Total NET: -225 mL          Daily     Daily     LABS:                        9.0    14.85 )-----------( 347      ( 04 Aug 2022 09:48 )             28.0     08-04    135  |  99  |  18  ----------------------------<  106<H>  4.0   |  22  |  0.37<L>    Ca    7.8<L>      04 Aug 2022 09:48                    PHYSICAL EXAM:  nad  nonlabored resp  rle  dressing c/d/i  prevena  HV w serosang  +gastrocta/ehl/fhl/ta  silt s/s/sp/dp/t  +dp

## 2022-08-05 NOTE — CONSULT NOTE ADULT - SUBJECTIVE AND OBJECTIVE BOX
HPI:  62y Female presents as direct transfer from [facility], c/o R hip pain that begain in May, then this past weekend was unable to bear weight and went to nearby hospital, where she was found to have R acetabular lesion, lung lesions and brain lesions per pt. R hip lesion was biopsied at prior hospital and consistent with metastatic lung cancer. Patient denies numbness. Denies trauma. Patient denies headstrike or LOC. Patient denies radiation of pain. Patient denies numbness/tingling/burning in the RLE. No other bone/joint complaints. Patient is a community ambulator at baseline without assistive devices.    Patient was admitted on 7/28, s/p Right acetabular mass excision, total hip replacement on 8/3, patient cleared for WBAT LRE, anterior hip precautions. seen earlier today, participating with PT, complains of pain.       REVIEW OF SYSTEMS  Constitutional - No fever, No weight loss, No fatigue  HEENT - No eye pain, No visual disturbances, No difficulty hearing, No tinnitus, No vertigo, No neck pain  Respiratory - No cough, No wheezing, No shortness of breath  Cardiovascular - No chest pain, No palpitations  Gastrointestinal - No abdominal pain, No nausea, No vomiting, No diarrhea, No constipation  Genitourinary - No dysuria, No frequency, No hematuria, No incontinence  Psychiatric - No depression, No anxiety    VITALS  T(C): 36.7 (08-05-22 @ 11:48), Max: 36.9 (08-04-22 @ 20:14)  HR: 140 (08-05-22 @ 15:12) (67 - 140)  BP: 134/86 (08-05-22 @ 15:12) (118/82 - 136/77)  RR: 18 (08-05-22 @ 11:48) (18 - 18)  SpO2: 96% (08-05-22 @ 11:48) (95% - 96%)  Wt(kg): --    PAST MEDICAL & SURGICAL HISTORY  see HPI     SOCIAL HISTORY  Smoking - Denied  EtOH - Denied   Drugs - Denied    FUNCTIONAL HISTORY  Lives with , 16 steps to enter  Independent AMB and ADLs PTA, used RW x 2 days prior to admission     CURRENT FUNCTIONAL STATUS  8/5 PT  bed mobility mod to max assist  transfers mod assist   gait min assist x 5 feet     8/4 OT  bed mobility mod assist x 2  transfers min assist x 2    FAMILY HISTORY   no pertinent history in first degree relatives     RECENT LABS/IMAGING  CBC Full  -  ( 05 Aug 2022 05:58 )  WBC Count : 21.21 K/uL  RBC Count : 3.10 M/uL  Hemoglobin : 9.1 g/dL  Hematocrit : 28.4 %  Platelet Count - Automated : 385 K/uL  Mean Cell Volume : 91.6 fl  Mean Cell Hemoglobin : 29.4 pg  Mean Cell Hemoglobin Concentration : 32.0 gm/dL  Auto Neutrophil # : x  Auto Lymphocyte # : x  Auto Monocyte # : x  Auto Eosinophil # : x  Auto Basophil # : x  Auto Neutrophil % : x  Auto Lymphocyte % : x  Auto Monocyte % : x  Auto Eosinophil % : x  Auto Basophil % : x    08-05    134<L>  |  97  |  14  ----------------------------<  124<H>  3.8   |  26  |  0.41<L>    Ca    7.8<L>      05 Aug 2022 05:58    < from: Xray Pelvis AP only (08.03.22 @ 17:25) >    IMPRESSION:  Long stem right total hip prosthesis with cup-cage acetabular   reconstruction component and periacetabular cement reinforcement   implanted.    Intact and aligned hardware and no periprosthetic fractures.    Postoperative soft tissue changes.    Surgical skin staples overlie and drain operative site.    Correlate with intraoperative findings.    Redemonstrated lytic metastatic destructive changes of the right ischium   and portions of the remnant right superior and inferior pubic rami.      < end of copied text >        ALLERGIES  No Known Allergies      MEDICATIONS   aluminum hydroxide/magnesium hydroxide/simethicone Suspension 30 milliLiter(s) Oral four times a day PRN  enoxaparin Injectable 40 milliGRAM(s) SubCutaneous every 24 hours  fentaNYL   Patch  50 MICROgram(s)/Hr 1 Patch Transdermal every 72 hours  HYDROmorphone  Injectable 1 milliGRAM(s) IV Push every 2 hours PRN  HYDROmorphone  Injectable 1.5 milliGRAM(s) IV Push every 2 hours PRN  magnesium hydroxide Suspension 30 milliLiter(s) Oral daily PRN  multivitamin 1 Tablet(s) Oral daily  naloxegol 25 milliGRAM(s) Oral daily  ondansetron Injectable 4 milliGRAM(s) IV Push every 6 hours PRN  pantoprazole    Tablet 40 milliGRAM(s) Oral before breakfast  polyethylene glycol 3350 17 Gram(s) Oral at bedtime  senna 2 Tablet(s) Oral at bedtime      ----------------------------------------------------------------------------------------  PHYSICAL EXAM  Constitutional - NAD, Comfortable, sitting in chair   Chest - Breathing comfortably  Cardiovascular - S1S2   Abdomen - Soft   Extremities - No C/C/E, No calf tenderness   Neurologic Exam -                    Cognitive - Awake, Alert, AAO to self, place, date, year, situation     Communication - Fluent, No dysarthria        Motor -                     LEFT    UE - ShAB 5/5, EF 5/5, EE 5/5, WE 5/5,  5/5                    RIGHT UE - ShAB 5/5, EF 5/5, EE 5/5, WE 5/5,  5/5                    LEFT    LE - HF 5/5, KE 5/5, DF 5/5, PF 5/5                    RIGHT LE - KE 4/5, DF 5/5, PF 5/5        Sensory - Intact to LT     Psychiatric - Mood stable, Affect WNL  ----------------------------------------------------------------------------------------  ASSESSMENT/PLAN  62yFemale recently diagnosed with metastatic lung cancer, functional deficits after right AMANDEEP   WBAT RLE  bowel regimen  Pain - Tylenol, seen by palliative care, fentanyl patch, dilaudid IV (needs to be on oral pain meds for acute rehab, plan to switch to oxycodone), pain management consult pending   DVT PPX - SCDs  Rehab - Will continue to follow for ongoing rehab needs and recommendations.    Recommend ACUTE inpatient rehabilitation for the functional deficits consisting of 3 hours of therapy/day & 24 hour RN/daily PMR physician for comorbid medical management. Patient will be able to tolerate 3 hours a day.

## 2022-08-05 NOTE — PROGRESS NOTE ADULT - PROBLEM SELECTOR PLAN 1
fentanyl patch at 50mcg/72h ( started 8/4)   Ordered IV dilaudid 1mg q2h PRN moderate pain  Ordered IV dilaudid 1.5mg q2h PRN severe pain  -Based on PRN use will transition back to PO regimen  Continue bowel regimen  Narcan PRN.  -will continue to monitor PRN usage as pt should start to feel effects of fentanyl patch today.  plan to transition to PO OXY for prn usage

## 2022-08-05 NOTE — PROGRESS NOTE ADULT - SUBJECTIVE AND OBJECTIVE BOX
62y Female presents as direct transfer from [facility], c/o R hip pain that begain in May, then this past weekend was unable to bear weight and went to nearby hospital, where she was found to have R acetabular lesion, lung lesions and brain lesions per pt. R hip lesion was biopsied at prior hospital and consistent with metastatic lung cancer. Patient denies numbness. Denies trauma. Patient denies headstrike or LOC. Patient denies radiation of pain. Patient denies numbness/tingling/burning in the RLE. No other bone/joint complaints. Patient is a community ambulator at baseline without assistive devices. Patient seen now resting with continue complaint of pain in the right leg. Patient s/p CT scan which shows stool in the colon. Patient with continue complaint of pain at the fracture site. Patient seen Post op. tolerated the procedure well. Pain seems better controlled.       MEDICATIONS  (STANDING):  enoxaparin Injectable 40 milliGRAM(s) SubCutaneous every 24 hours  fentaNYL   Patch  50 MICROgram(s)/Hr 1 Patch Transdermal every 72 hours  multivitamin 1 Tablet(s) Oral daily  naloxegol 25 milliGRAM(s) Oral daily  pantoprazole    Tablet 40 milliGRAM(s) Oral before breakfast  polyethylene glycol 3350 17 Gram(s) Oral at bedtime  senna 2 Tablet(s) Oral at bedtime    MEDICATIONS  (PRN):  aluminum hydroxide/magnesium hydroxide/simethicone Suspension 30 milliLiter(s) Oral four times a day PRN Indigestion  HYDROmorphone  Injectable 1 milliGRAM(s) IV Push every 2 hours PRN Moderate Pain (4 - 6)  HYDROmorphone  Injectable 1.5 milliGRAM(s) IV Push every 2 hours PRN Severe Pain (7 - 10)  magnesium hydroxide Suspension 30 milliLiter(s) Oral daily PRN Constipation  ondansetron Injectable 4 milliGRAM(s) IV Push every 6 hours PRN Nausea and/or Vomiting          VITALS:   T(C): 36.7 (08-05-22 @ 11:48), Max: 36.9 (08-04-22 @ 20:14)  HR: 67 (08-05-22 @ 11:48) (67 - 111)  BP: 125/83 (08-05-22 @ 11:48) (118/82 - 136/77)  RR: 18 (08-05-22 @ 11:48) (18 - 18)  SpO2: 96% (08-05-22 @ 11:48) (95% - 96%)  Wt(kg): --    PHYSICAL EXAM:  GENERAL: NAD, well-groomed, well-developed  HEAD:  Atraumatic, Normocephalic  EYES: EOMI, PERRLA, conjunctiva and sclera clear  ENMT: No tonsillar erythema, exudates, or enlargement; Moist mucous membranes, Good dentition, No lesions  NECK: Supple, No JVD, Normal thyroid  NERVOUS SYSTEM:  Alert & Oriented X3, Good concentration;   CHEST/LUNG: Clear to percussion bilaterally; No rales, rhonchi, wheezing, or rubs  HEART: Regular rate and rhythm; No murmurs, rubs, or gallops  ABDOMEN: Soft, Nontender, Nondistended; Bowel sounds present  EXTREMITIES:  2+ Peripheral Pulses, No clubbing, cyanosis, or edema  LYMPH: No lymphadenopathy noted  SKIN: No rashes or lesions    LABS:  ABG - ( 03 Aug 2022 17:12 )  pH, Arterial: 7.31  pH, Blood: x     /  pCO2: 44    /  pO2: 215   / HCO3: 22    / Base Excess: -3.9  /  SaO2: 99.9                  CBC Full  -  ( 05 Aug 2022 05:58 )  WBC Count : 21.21 K/uL  RBC Count : 3.10 M/uL  Hemoglobin : 9.1 g/dL  Hematocrit : 28.4 %  Platelet Count - Automated : 385 K/uL  Mean Cell Volume : 91.6 fl  Mean Cell Hemoglobin : 29.4 pg  Mean Cell Hemoglobin Concentration : 32.0 gm/dL  Auto Neutrophil # : x  Auto Lymphocyte # : x  Auto Monocyte # : x  Auto Eosinophil # : x  Auto Basophil # : x  Auto Neutrophil % : x  Auto Lymphocyte % : x  Auto Monocyte % : x  Auto Eosinophil % : x  Auto Basophil % : x    08-05    134<L>  |  97  |  14  ----------------------------<  124<H>  3.8   |  26  |  0.41<L>    Ca    7.8<L>      05 Aug 2022 05:58            CAPILLARY BLOOD GLUCOSE          RADIOLOGY & ADDITIONAL TESTS:

## 2022-08-05 NOTE — PROGRESS NOTE ADULT - PROBLEM SELECTOR PLAN 3
Patient has been following up with a oncologist in [place]  States she recently had a bone biopsy but doesn't have the results  will need to eventually start chemo +/- RT.

## 2022-08-05 NOTE — PROGRESS NOTE ADULT - PROBLEM SELECTOR PLAN 5
would continue miralax, senna and movantik  follow for a BM  PO as tolerated  if needed would increase miralax to BID

## 2022-08-05 NOTE — PROGRESS NOTE ADULT - ASSESSMENT
61F w/ met lung cancer found to have R acetabular lesion and mild activity on R proximal femur on bone scan. S/p R mass excision and complex AMANDEEP, has Provena and HMV., Pain is currently well controlled.    -F/u AM labs, transfuse as needed  -WBAT, OOB in AM  -Anterior hip precautions  -Provena until POD#5  -DVT ppx- Lovenox  -Pain control w PCA  -Appreciate hem/onc recs  -Appreciate medicine following  -Appreciate GI recs

## 2022-08-06 NOTE — PROGRESS NOTE ADULT - SUBJECTIVE AND OBJECTIVE BOX
62y Female presents as direct transfer from [facility], c/o R hip pain that begain in May, then this past weekend was unable to bear weight and went to nearby hospital, where she was found to have R acetabular lesion, lung lesions and brain lesions per pt. R hip lesion was biopsied at prior hospital and consistent with metastatic lung cancer. Patient denies numbness. Denies trauma. Patient denies headstrike or LOC. Patient denies radiation of pain. Patient denies numbness/tingling/burning in the RLE. No other bone/joint complaints. Patient is a community ambulator at baseline without assistive devices. Patient seen now resting with continue complaint of pain in the right leg. Patient s/p CT scan which shows stool in the colon.  Pain seems better controlled. Continued complaint of constipation      MEDICATIONS  (STANDING):  enoxaparin Injectable 40 milliGRAM(s) SubCutaneous every 24 hours  fentaNYL   Patch  50 MICROgram(s)/Hr 1 Patch Transdermal every 72 hours  multivitamin 1 Tablet(s) Oral daily  naloxegol 25 milliGRAM(s) Oral daily  pantoprazole    Tablet 40 milliGRAM(s) Oral before breakfast  polyethylene glycol 3350 17 Gram(s) Oral at bedtime  senna 2 Tablet(s) Oral at bedtime    MEDICATIONS  (PRN):  aluminum hydroxide/magnesium hydroxide/simethicone Suspension 30 milliLiter(s) Oral four times a day PRN Indigestion  bisacodyl Suppository 10 milliGRAM(s) Rectal daily PRN Constipation  HYDROmorphone  Injectable 2 milliGRAM(s) IV Push every 2 hours PRN Severe Pain (7 - 10)  HYDROmorphone  Injectable 1.5 milliGRAM(s) IV Push every 2 hours PRN Moderate Pain (4 - 6)  magnesium hydroxide Suspension 30 milliLiter(s) Oral daily PRN Constipation  ondansetron Injectable 4 milliGRAM(s) IV Push every 6 hours PRN Nausea and/or Vomiting          VITALS:   T(C): 36.8 (08-06-22 @ 12:13), Max: 36.9 (08-05-22 @ 20:40)  HR: 114 (08-06-22 @ 12:13) (104 - 140)  BP: 125/83 (08-06-22 @ 12:13) (121/76 - 135/79)  RR: 18 (08-06-22 @ 12:13) (18 - 18)  SpO2: 97% (08-06-22 @ 12:13) (93% - 97%)  Wt(kg): --    PHYSICAL EXAM:  GENERAL: NAD, well-groomed, well-developed  HEAD:  Atraumatic, Normocephalic  EYES: EOMI, PERRLA, conjunctiva and sclera clear  ENMT: No tonsillar erythema, exudates, or enlargement; Moist mucous membranes, Good dentition, No lesions  NECK: Supple, No JVD, Normal thyroid  NERVOUS SYSTEM:  Alert & Oriented X3, Good concentration;   CHEST/LUNG: Clear to percussion bilaterally; No rales, rhonchi, wheezing, or rubs  HEART: Regular rate and rhythm; No murmurs, rubs, or gallops  ABDOMEN: Soft, Nontender, Nondistended; Bowel sounds present  EXTREMITIES:  2+ Peripheral Pulses, No clubbing, cyanosis, or edema  LYMPH: No lymphadenopathy noted  SKIN: No rashes or lesions      LABS:        CBC Full  -  ( 06 Aug 2022 06:21 )  WBC Count : 22.89 K/uL  RBC Count : 3.06 M/uL  Hemoglobin : 9.2 g/dL  Hematocrit : 28.9 %  Platelet Count - Automated : 430 K/uL  Mean Cell Volume : 94.4 fl  Mean Cell Hemoglobin : 30.1 pg  Mean Cell Hemoglobin Concentration : 31.8 gm/dL  Auto Neutrophil # : x  Auto Lymphocyte # : x  Auto Monocyte # : x  Auto Eosinophil # : x  Auto Basophil # : x  Auto Neutrophil % : x  Auto Lymphocyte % : x  Auto Monocyte % : x  Auto Eosinophil % : x  Auto Basophil % : x    08-06    133<L>  |  92<L>  |  11  ----------------------------<  107<H>  3.7   |  26  |  <0.30<L>    Ca    8.0<L>      06 Aug 2022 06:21            CAPILLARY BLOOD GLUCOSE          RADIOLOGY & ADDITIONAL TESTS:

## 2022-08-06 NOTE — PROGRESS NOTE ADULT - PROBLEM SELECTOR PLAN 6
continue to monitor urine output  Gimenez as needed  May improve when Patient starts moving her bowels

## 2022-08-06 NOTE — PROGRESS NOTE ADULT - PROBLEM SELECTOR PLAN 2
continue fentanyl patch Q72h  Dilaudid q6h for breakthrough  May need to increase the frequency  pain management to see Patient   Patient states she was taking Dilaudid IV 2mg q6 while in [facility] without incident

## 2022-08-06 NOTE — PROGRESS NOTE ADULT - ASSESSMENT
61 y/o FM  s/p Right mass excision, complex total hip arthroplasty POD#3, f/u DTV,WBAT ambulation  Reshma Hurtado PA-C  Orthopaedic Surgery  Team pager [phone]  [facility] [phone]  [phone]

## 2022-08-06 NOTE — PROGRESS NOTE ADULT - PROBLEM SELECTOR PLAN 3
Patient has been following up with a oncologist in [place]  States she recently had a bone biopsy but doesn't have the results  will need to eventually start chemo +/- RT.

## 2022-08-06 NOTE — PROGRESS NOTE ADULT - SUBJECTIVE AND OBJECTIVE BOX
Patient is a 62y Female     Patient is a 62y old  Female who presents with a chief complaint of Right acetabular lesion (06 Aug 2022 06:56)      HPI:  62y Female presents as direct transfer from [facility], c/o R hip pain that begain in May, then this past weekend was unable to bear weight and went to nearby hospital, where she was found to have R acetabular lesion, lung lesions and brain lesions per pt. R hip lesion was biopsied at prior hospital and consistent with metastatic lung cancer. Patient denies numbness. Denies trauma. Patient denies headstrike or LOC. Patient denies radiation of pain. Patient denies numbness/tingling/burning in the RLE. No other bone/joint complaints. Patient is a community ambulator at baseline without assistive devices.    PAST MEDICAL & SURGICAL HISTORY:    MEDICATIONS  (STANDING):  acetaminophen     Tablet .. 975 milliGRAM(s) Oral every 8 hours  enoxaparin Injectable 40 milliGRAM(s) SubCutaneous every 24 hours  senna 2 Tablet(s) Oral at bedtime  sodium chloride 0.9%. 1000 milliLiter(s) (75 mL/Hr) IV Continuous <Continuous>    MEDICATIONS  (PRN):  HYDROmorphone  Injectable 0.5 milliGRAM(s) IV Push every 6 hours PRN Severe Pain (7 - 10)  magnesium hydroxide Suspension 30 milliLiter(s) Oral daily PRN Constipation  melatonin 3 milliGRAM(s) Oral at bedtime PRN Insomnia  oxyCODONE    IR 2.5 milliGRAM(s) Oral every 4 hours PRN Moderate Pain (4 - 6)  oxyCODONE    IR 5 milliGRAM(s) Oral every 4 hours PRN Severe Pain (7 - 10)    Allergies    No Known Allergies    Intolerances                    T(C): 36.7 (07-28-22 @ 23:04), Max: 36.7 (07-28-22 @ 23:04)  HR: 114 (07-28-22 @ 23:04) (114 - 114)  BP: 126/87 (07-28-22 @ 23:04) (126/87 - 126/87)  RR: 18 (07-28-22 @ 23:04) (18 - 18)  SpO2: 93% (07-28-22 @ 23:04) (93% - 93%)  Wt(kg): --    PE   RLE:  Skin intact; No ecchymosis/soft tissue swelling  Compartments soft; + TTP about hip. No TTP to knee/leg/ankle/foot   ROM lmited 2/2 pain   Unable to SLR;   Motor intact GS/TA/FHL/EHL  SILT L2-S1  +dp    LLE/BUE:   No bony TTP; Good ROM w/o pain; Exam Unremarkable    Imaging:  XR and CT showing R acetabular lesion    62y Female with R acetabular lesion    - PWB RLE with rolling walker  - Pain control  - FU labs   - FU medicine and heme onc clearance  - Lovenox for dvt ppx  - Plan for OR Wednesday 8/3   (28 Jul 2022 23:54)      PAST MEDICAL & SURGICAL HISTORY:      MEDICATIONS  (STANDING):  enoxaparin Injectable 40 milliGRAM(s) SubCutaneous every 24 hours  fentaNYL   Patch  50 MICROgram(s)/Hr 1 Patch Transdermal every 72 hours  multivitamin 1 Tablet(s) Oral daily  naloxegol 25 milliGRAM(s) Oral daily  pantoprazole    Tablet 40 milliGRAM(s) Oral before breakfast  polyethylene glycol 3350 17 Gram(s) Oral at bedtime  senna 2 Tablet(s) Oral at bedtime      Allergies    No Known Allergies    Intolerances        SOCIAL HISTORY:  Denies ETOh,Smoking,     FAMILY HISTORY:      REVIEW OF SYSTEMS:    CONSTITUTIONAL: No weakness, fevers or chills  EYES/ENT: No visual changes;  No vertigo or throat pain   NECK: No pain or stiffness  RESPIRATORY: No cough, wheezing, hemoptysis; No shortness of breath  CARDIOVASCULAR: No chest pain or palpitations  GASTROINTESTINAL: No abdominal or epigastric pain. No nausea, vomiting, or hematemesis; No diarrhea or constipation. No melena or hematochezia.  GENITOURINARY: No dysuria, frequency or hematuria  NEUROLOGICAL: No numbness or weakness  SKIN: No itching, burning, rashes, or lesions   All other review of systems is negative unless indicated above.    VITAL:  T(C): , Max: 36.9 (08-05-22 @ 20:40)  T(F): , Max: 98.4 (08-05-22 @ 20:40)  HR: 115 (08-06-22 @ 04:25)  BP: 126/68 (08-06-22 @ 04:25)  BP(mean): --  RR: 18 (08-06-22 @ 04:25)  SpO2: 93% (08-06-22 @ 04:25)  Wt(kg): --    I and O's:    08-04 @ 07:01  -  08-05 @ 07:00  --------------------------------------------------------  IN: 780 mL / OUT: 705 mL / NET: 75 mL    08-05 @ 07:01  -  08-06 @ 07:00  --------------------------------------------------------  IN: 840 mL / OUT: 525 mL / NET: 315 mL          PHYSICAL EXAM:    Constitutional: NAD  HEENT: PERRLA,   Neck: No JVD  Respiratory: CTA B/L  Cardiovascular: S1 and S2  Gastrointestinal: BS+, soft, NT/ND  Extremities: No peripheral edema  Neurological: A/O x 3, no focal deficits  Psychiatric: Normal mood, normal affect  : No Gimenez  Skin: No rashes  Access: Not applicable  Back: No CVA tenderness    LABS:                        9.1    21.21 )-----------( 385      ( 05 Aug 2022 05:58 )             28.4     08-06    133<L>  |  92<L>  |  11  ----------------------------<  107<H>  3.7   |  26  |  <0.30<L>    Ca    8.0<L>      06 Aug 2022 06:21            RADIOLOGY & ADDITIONAL STUDIES:

## 2022-08-06 NOTE — CHART NOTE - NSCHARTNOTEFT_GEN_A_CORE
Severn PRNs IV dilaudid used  Increase in PRN may increase duration of response  No adverse effects noted or apparent  The patient is also bothered by severe leg spasms but cannot discern which is the greater issue: pain or spasm  IVP dilaudid 1.5 mg for moderate pain  IVP dilaudid 2.0  mg for severe pain    Opioid Education: They were counseled on strategies for pain tracking and we reviewed the chronicity of the symptoms.  We explored the potential side effect profile of opioids (nausea), staff and patient reporting of symptoms.    Begin naloxone PRN

## 2022-08-06 NOTE — PROGRESS NOTE ADULT - PROBLEM SELECTOR PLAN 5
would continue miralax, senna and movantik  follow for a BM  PO as tolerated  would increase miralax to BID

## 2022-08-06 NOTE — PROGRESS NOTE ADULT - SUBJECTIVE AND OBJECTIVE BOX
Patient is a 62y old  Female who presents with a chief complaint of Right acetabular lesion  Patient s/p Right mass excision, complex total hip arthroplasty POD#3  Patient appears comfortable  No complaints    T(C): 36.9 (08-06-22 @ 04:25), Max: 36.9 (08-05-22 @ 20:40)  HR: 115 (08-06-22 @ 04:25) (67 - 140)  BP: 126/68 (08-06-22 @ 04:25) (121/76 - 135/79)  RR: 18 (08-06-22 @ 04:25) (18 - 18)  SpO2: 93% (08-06-22 @ 04:25) (93% - 96%)    PHYSICAL EXAM:  NAD, Alert  [Right ] Hip: Dressing C/D/I; sensation grossly intact to light touch; (+) DF/PF; (+) Distal Pulses;     LABS:                     9.1    21.21 )-----------( 385      ( 05 Aug 2022 05:58 )             28.4   8-05    134<L>  |  97  |  14  ----------------------------<  124<H>  3.8   |  26  |  0.41<L>  Ca    7.8<L>      05 Aug 2022 05:58

## 2022-08-07 NOTE — PROGRESS NOTE ADULT - PROBLEM SELECTOR PLAN 3
Patient has been following up with a oncologist in [place]  States she recently had a bone biopsy but doesn't have the results  will need to eventually start chemo +/- RT.

## 2022-08-07 NOTE — PROGRESS NOTE ADULT - PROBLEM SELECTOR PLAN 1
Predominant symptom: pain  Likely due to pathologic fracture  Degree of control: moderate  Relative to previous day: comparable  Duration of PRN: 2-3 hours  Current treatment regimen:  TDF 50 mcg  IVP dilaudid 1.5 mg PRN for moderate pain  IVP dilaudid 2.0 mg PRN for severe pain  Recommendations: No changes on 8/7  Consider po dilaudid transition if PRNs helpful routinely  ?TDF increase if warranted  Risk mitigation/Bowel regimen: ensure naloxone during admission and during discharge  Adverse events noted: non reported or observed

## 2022-08-07 NOTE — PROGRESS NOTE ADULT - PROBLEM SELECTOR PLAN 5
Actions:  [x] Rapport building     [x] Symptom assessment    [] Eliciting preferences of goals   [] Prognostic understanding    [x] Emotional Support  [] Coping skill development  []  Other  Interdisciplinary Referrals: [facility] SW for support  Communication: n/a  Documentation Review: [x] Primary Team [] Consultants [] Interdisciplinary team  Content: n/a

## 2022-08-07 NOTE — PROGRESS NOTE ADULT - SUBJECTIVE AND OBJECTIVE BOX
Patient is a 62y old  Female who presents with a chief complaint of right acetabular lesion  Patient is s/p excision of mass, right complex total hip arthroplasty POD#4  Patient appears comfortable    T(C): 36.9 (08-07-22 @ 04:29), Max: 37.2 (08-06-22 @ 20:24)  HR: 106 (08-07-22 @ 04:29) (106 - 114)  BP: 118/70 (08-07-22 @ 04:29) (111/74 - 133/76)  RR: 18 (08-07-22 @ 04:29) (18 - 18)  SpO2: 97% (08-07-22 @ 04:29) (95% - 97%)      PHYSICAL EXAM:  NAD, Alert  [Right ] Hip: Dressing C/D/I; sensation grossly intact to light touch; (+) DF/PF; (+) Distal Pulses;   Patient SC'd x 2 Urinary retention, no BM  LABS:                      9.2    22.89 )-----------( 430      ( 06 Aug 2022 06:21 )             28.9   08-06    133<L>  |  92<L>  |  11  ----------------------------<  107<H>  3.7   |  26  |  <0.30<L>  Ca    8.0<L>      06 Aug 2022 06:21

## 2022-08-07 NOTE — PROGRESS NOTE ADULT - PROBLEM SELECTOR PLAN 2
continue fentanyl patch Q72h  Dilaudid 2q2 with 1.5 for breakthrough  pain management following  eventual transition to PO

## 2022-08-07 NOTE — PROGRESS NOTE ADULT - ASSESSMENT
62 y/o FM s/p right pelvis mass excision, complex total hip arthroplasty POD#4, ambulation, Gimenez if retention continues, f/u PMR  Reshma Hurtado PA-C  Orthopaedic Surgery  Team pager [phone]  [facility] [phone]  [phone]

## 2022-08-07 NOTE — PROGRESS NOTE ADULT - PROBLEM SELECTOR PLAN 5
would continue miralax, senna and movantik  Patient with a large BM  PO as tolerated  continue current regime

## 2022-08-07 NOTE — PROGRESS NOTE ADULT - SUBJECTIVE AND OBJECTIVE BOX
Patient is a 62y Female     Patient is a 62y old  Female who presents with a chief complaint of Right Acetabular lesion (07 Aug 2022 06:46)      HPI:  62y Female presents as direct transfer from [facility], c/o R hip pain that begain in May, then this past weekend was unable to bear weight and went to nearby hospital, where she was found to have R acetabular lesion, lung lesions and brain lesions per pt. R hip lesion was biopsied at prior hospital and consistent with metastatic lung cancer. Patient denies numbness. Denies trauma. Patient denies headstrike or LOC. Patient denies radiation of pain. Patient denies numbness/tingling/burning in the RLE. No other bone/joint complaints. Patient is a community ambulator at baseline without assistive devices.    PAST MEDICAL & SURGICAL HISTORY:    MEDICATIONS  (STANDING):  acetaminophen     Tablet .. 975 milliGRAM(s) Oral every 8 hours  enoxaparin Injectable 40 milliGRAM(s) SubCutaneous every 24 hours  senna 2 Tablet(s) Oral at bedtime  sodium chloride 0.9%. 1000 milliLiter(s) (75 mL/Hr) IV Continuous <Continuous>    MEDICATIONS  (PRN):  HYDROmorphone  Injectable 0.5 milliGRAM(s) IV Push every 6 hours PRN Severe Pain (7 - 10)  magnesium hydroxide Suspension 30 milliLiter(s) Oral daily PRN Constipation  melatonin 3 milliGRAM(s) Oral at bedtime PRN Insomnia  oxyCODONE    IR 2.5 milliGRAM(s) Oral every 4 hours PRN Moderate Pain (4 - 6)  oxyCODONE    IR 5 milliGRAM(s) Oral every 4 hours PRN Severe Pain (7 - 10)    Allergies    No Known Allergies    Intolerances                    T(C): 36.7 (07-28-22 @ 23:04), Max: 36.7 (07-28-22 @ 23:04)  HR: 114 (07-28-22 @ 23:04) (114 - 114)  BP: 126/87 (07-28-22 @ 23:04) (126/87 - 126/87)  RR: 18 (07-28-22 @ 23:04) (18 - 18)  SpO2: 93% (07-28-22 @ 23:04) (93% - 93%)  Wt(kg): --    PE   RLE:  Skin intact; No ecchymosis/soft tissue swelling  Compartments soft; + TTP about hip. No TTP to knee/leg/ankle/foot   ROM lmited 2/2 pain   Unable to SLR;   Motor intact GS/TA/FHL/EHL  SILT L2-S1  +dp    LLE/BUE:   No bony TTP; Good ROM w/o pain; Exam Unremarkable    Imaging:  XR and CT showing R acetabular lesion    62y Female with R acetabular lesion    - PWB RLE with rolling walker  - Pain control  - FU labs   - FU medicine and heme onc clearance  - Lovenox for dvt ppx  - Plan for OR Wednesday 8/3   (28 Jul 2022 23:54)      PAST MEDICAL & SURGICAL HISTORY:      MEDICATIONS  (STANDING):  enoxaparin Injectable 40 milliGRAM(s) SubCutaneous every 24 hours  fentaNYL   Patch  50 MICROgram(s)/Hr 1 Patch Transdermal every 72 hours  multivitamin 1 Tablet(s) Oral daily  naloxegol 25 milliGRAM(s) Oral daily  pantoprazole    Tablet 40 milliGRAM(s) Oral before breakfast  polyethylene glycol 3350 17 Gram(s) Oral two times a day  senna 2 Tablet(s) Oral at bedtime      Allergies    No Known Allergies    Intolerances        SOCIAL HISTORY:  Denies ETOh,Smoking,     FAMILY HISTORY:      REVIEW OF SYSTEMS:    CONSTITUTIONAL: No weakness, fevers or chills  EYES/ENT: No visual changes;  No vertigo or throat pain   NECK: No pain or stiffness  RESPIRATORY: No cough, wheezing, hemoptysis; No shortness of breath  CARDIOVASCULAR: No chest pain or palpitations  GASTROINTESTINAL: No abdominal or epigastric pain. No nausea, vomiting, or hematemesis; No diarrhea or constipation. No melena or hematochezia.  GENITOURINARY: No dysuria, frequency or hematuria  NEUROLOGICAL: No numbness or weakness  SKIN: No itching, burning, rashes, or lesions   All other review of systems is negative unless indicated above.    VITAL:  T(C): , Max: 37.2 (08-06-22 @ 20:24)  T(F): , Max: 99 (08-06-22 @ 20:24)  HR: 106 (08-07-22 @ 04:29)  BP: 118/70 (08-07-22 @ 04:29)  BP(mean): --  RR: 18 (08-07-22 @ 04:29)  SpO2: 97% (08-07-22 @ 04:29)  Wt(kg): --    I and O's:    08-05 @ 07:01  -  08-06 @ 07:00  --------------------------------------------------------  IN: 840 mL / OUT: 525 mL / NET: 315 mL    08-06 @ 07:01  -  08-07 @ 07:00  --------------------------------------------------------  IN: 1320 mL / OUT: 2350 mL / NET: -1030 mL          PHYSICAL EXAM:    Constitutional: NAD  HEENT: PERRLA,   Neck: No JVD  Respiratory: CTA B/L  Cardiovascular: S1 and S2  Gastrointestinal: BS+, soft, NT/ND  Extremities: No peripheral edema  Neurological: A/O x 3, no focal deficits  Psychiatric: Normal mood, normal affect  : No Gimenez  Skin: No rashes  Access: Not applicable  Back: No CVA tenderness    LABS:                        9.2    22.89 )-----------( 430      ( 06 Aug 2022 06:21 )             28.9     08-06    133<L>  |  92<L>  |  11  ----------------------------<  107<H>  3.7   |  26  |  <0.30<L>    Ca    8.0<L>      06 Aug 2022 06:21            RADIOLOGY & ADDITIONAL STUDIES:

## 2022-08-07 NOTE — PROGRESS NOTE ADULT - SUBJECTIVE AND OBJECTIVE BOX
62y Female presents as direct transfer from [facility], c/o R hip pain that begain in May, then this past weekend was unable to bear weight and went to nearby hospital, where she was found to have R acetabular lesion, lung lesions and brain lesions per pt. R hip lesion was biopsied at prior hospital and consistent with metastatic lung cancer. Patient denies numbness. Denies trauma. Patient denies headstrike or LOC. Patient denies radiation of pain. Patient denies numbness/tingling/burning in the RLE. No other bone/joint complaints. Patient is a community ambulator at baseline without assistive devices. Patient seen now resting with continue complaint of pain in the right leg. Patient s/p CT scan which shows stool in the colon.  Pain seems better controlled. Patient with a large BM today      MEDICATIONS  (STANDING):  enoxaparin Injectable 40 milliGRAM(s) SubCutaneous every 24 hours  fentaNYL   Patch  50 MICROgram(s)/Hr 1 Patch Transdermal every 72 hours  multivitamin 1 Tablet(s) Oral daily  naloxegol 25 milliGRAM(s) Oral daily  pantoprazole    Tablet 40 milliGRAM(s) Oral before breakfast  polyethylene glycol 3350 17 Gram(s) Oral two times a day  senna 2 Tablet(s) Oral every 12 hours    MEDICATIONS  (PRN):  aluminum hydroxide/magnesium hydroxide/simethicone Suspension 30 milliLiter(s) Oral four times a day PRN Indigestion  bisacodyl Suppository 10 milliGRAM(s) Rectal daily PRN Constipation  HYDROmorphone  Injectable 2 milliGRAM(s) IV Push every 2 hours PRN Severe Pain (7 - 10)  HYDROmorphone  Injectable 1.5 milliGRAM(s) IV Push every 2 hours PRN Moderate Pain (4 - 6)  magnesium hydroxide Suspension 30 milliLiter(s) Oral daily PRN Constipation  ondansetron Injectable 4 milliGRAM(s) IV Push every 6 hours PRN Nausea and/or Vomiting          VITALS:   T(C): 36.8 (08-07-22 @ 12:15), Max: 37.2 (08-06-22 @ 20:24)  HR: 118 (08-07-22 @ 13:29) (104 - 118)  BP: 105/67 (08-07-22 @ 13:29) (105/67 - 133/76)  RR: 18 (08-07-22 @ 12:15) (18 - 18)  SpO2: 97% (08-07-22 @ 12:15) (95% - 97%)  Wt(kg): --    PHYSICAL EXAM:  GENERAL: NAD, well-groomed, well-developed  HEAD:  Atraumatic, Normocephalic  EYES: EOMI, PERRLA, conjunctiva and sclera clear  ENMT: No tonsillar erythema, exudates, or enlargement; Moist mucous membranes, Good dentition, No lesions  NECK: Supple, No JVD, Normal thyroid  NERVOUS SYSTEM:  Alert & Oriented X3, Good concentration;   CHEST/LUNG: Clear to percussion bilaterally; No rales, rhonchi, wheezing, or rubs  HEART: Regular rate and rhythm; No murmurs, rubs, or gallops  ABDOMEN: Soft, Nontender, Nondistended; Bowel sounds present  EXTREMITIES:  2+ Peripheral Pulses, No clubbing, cyanosis, or edema  LYMPH: No lymphadenopathy noted  SKIN: No rashes or lesions      LABS:        CBC Full  -  ( 06 Aug 2022 06:21 )  WBC Count : 22.89 K/uL  RBC Count : 3.06 M/uL  Hemoglobin : 9.2 g/dL  Hematocrit : 28.9 %  Platelet Count - Automated : 430 K/uL  Mean Cell Volume : 94.4 fl  Mean Cell Hemoglobin : 30.1 pg  Mean Cell Hemoglobin Concentration : 31.8 gm/dL  Auto Neutrophil # : x  Auto Lymphocyte # : x  Auto Monocyte # : x  Auto Eosinophil # : x  Auto Basophil # : x  Auto Neutrophil % : x  Auto Lymphocyte % : x  Auto Monocyte % : x  Auto Eosinophil % : x  Auto Basophil % : x    08-06    133<L>  |  92<L>  |  11  ----------------------------<  107<H>  3.7   |  26  |  <0.30<L>    Ca    8.0<L>      06 Aug 2022 06:21            CAPILLARY BLOOD GLUCOSE          RADIOLOGY & ADDITIONAL TESTS:

## 2022-08-07 NOTE — PROGRESS NOTE ADULT - SUBJECTIVE AND OBJECTIVE BOX
HPI:  62y Female presents as direct transfer from [facility], c/o R hip pain that begain in May, then this past weekend was unable to bear weight and went to nearby hospital, where she was found to have R acetabular lesion, lung lesions and brain lesions per pt. R hip lesion was biopsied at prior hospital and consistent with metastatic lung cancer. Patient denies numbness. Denies trauma. Patient denies headstrike or LOC. Patient denies radiation of pain. Patient denies numbness/tingling/burning in the RLE. No other bone/joint complaints. Patient is a community ambulator at baseline without assistive devices.       The patient is on TDF 50 mcg with increases in IV dilaudid dose to increase the duration of the analgesic window.  She reports possible improvement with these doses, however, there was an isolated episode of pain that was incidental and not reflective of other episodes. The first 3 PRNs were fine but the last one did not seem to help.              MEDICATIONS  (STANDING):  acetaminophen     Tablet .. 975 milliGRAM(s) Oral every 8 hours  enoxaparin Injectable 40 milliGRAM(s) SubCutaneous every 24 hours  senna 2 Tablet(s) Oral at bedtime  sodium chloride 0.9%. 1000 milliLiter(s) (75 mL/Hr) IV Continuous <Continuous>    MEDICATIONS  (PRN):  HYDROmorphone  Injectable 0.5 milliGRAM(s) IV Push every 6 hours PRN Severe Pain (7 - 10)  magnesium hydroxide Suspension 30 milliLiter(s) Oral daily PRN Constipation  melatonin 3 milliGRAM(s) Oral at bedtime PRN Insomnia  oxyCODONE    IR 2.5 milliGRAM(s) Oral every 4 hours PRN Moderate Pain (4 - 6)  oxyCODONE    IR 5 milliGRAM(s) Oral every 4 hours PRN Severe Pain (7 - 10)    Allergies    No Known Allergies    Intolerances                            FAMILY HISTORY:    ITEMS NOT CHECKED ARE NOT PRESENT    SOCIAL HISTORY:   Significant other/partner[ x]  Children[x ]  Restorationist/Spirituality:  Substance hx:  [ ]   Tobacco hx:  [ ]   Alcohol hx: [ ]   Home Opioid hx:  [ ] I-Stop Reference No:  Living Situation: [ x]Home  [ ]Long term care  [ ]Rehab [ ]Other    ADVANCE DIRECTIVES:    DNR  MOLST  [ ]  Living Will  [ ]   DECISION MAKER(s):  [ ] Health Care Proxy(s)  [x ] Surrogate(s)  [ ] Guardian           Name(s): Phone Number(s):    BASELINE (I)ADL(s) (prior to admission):  [place]: [ ]Total  [ ] Moderate [ ]Dependent    Allergies    No Known Allergies    Intolerances    MEDICATIONS  (STANDING):  enoxaparin Injectable 40 milliGRAM(s) SubCutaneous every 24 hours  fentaNYL   Patch  50 MICROgram(s)/Hr 1 Patch Transdermal every 72 hours  multivitamin 1 Tablet(s) Oral daily  naloxegol 25 milliGRAM(s) Oral daily  pantoprazole    Tablet 40 milliGRAM(s) Oral before breakfast  polyethylene glycol 3350 17 Gram(s) Oral two times a day  senna 2 Tablet(s) Oral at bedtime    MEDICATIONS  (PRN):  aluminum hydroxide/magnesium hydroxide/simethicone Suspension 30 milliLiter(s) Oral four times a day PRN Indigestion  bisacodyl Suppository 10 milliGRAM(s) Rectal daily PRN Constipation  HYDROmorphone  Injectable 2 milliGRAM(s) IV Push every 2 hours PRN Severe Pain (7 - 10)  HYDROmorphone  Injectable 1.5 milliGRAM(s) IV Push every 2 hours PRN Moderate Pain (4 - 6)  magnesium hydroxide Suspension 30 milliLiter(s) Oral daily PRN Constipation  ondansetron Injectable 4 milliGRAM(s) IV Push every 6 hours PRN Nausea and/or Vomiting    PRESENT SYMPTOMS: [ ]Unable to obtain due to poor mentation   Source if other than patient:  [ ]Family   [ ]Team     Pain: [ x]yes [ ]no  QOL impact - significant  Location -                  leg  Aggravating factors - movement  Quality - sharp  Radiation - leg  Timing- with movement  Severity (0-10 scale): severe  Minimal acceptable level (0-10 scale):  n/a    PAIN AD Score:     [email or web address] (press ctrl +  left click to view)    Dyspnea:                           [ ]Mild [ ]Moderate [ ]Severe  Anxiety:                             [ ]Mild [ ]Moderate [ ]Severe  Fatigue:                             [ ]Mild [ ]Moderate [ ]Severe  Nausea:                             [ ]Mild [ ]Moderate [ ]Severe  Loss of appetite:              [ ]Mild [ ]Moderate [ ]Severe  Constipation:                    [  Mild [ x]Moderate [ ]Severe    Other Symptoms:  [a ]All other review of systems negative     Palliative Performance Status Version 2:      40   %    http://npcrc.org/files/news/palliative_performance_scale_ppsv2.pdf  PHYSICAL EXAM:  Vital Signs Last 24 Hrs  T(C): 36.7 (07 Aug 2022 08:15), Max: 37.2 (06 Aug 2022 20:24)  T(F): 98 (07 Aug 2022 08:15), Max: 99 (06 Aug 2022 20:24)  HR: 105 (07 Aug 2022 08:15) (105 - 114)  BP: 112/69 (07 Aug 2022 08:15) (111/74 - 133/76)  BP(mean): --  RR: 18 (07 Aug 2022 08:15) (18 - 18)  SpO2: 97% (07 Aug 2022 08:15) (95% - 97%)    Parameters below as of 07 Aug 2022 08:15  Patient On (Oxygen Delivery Method): room air     I&O's Summary    06 Aug 2022 07:01  -  07 Aug 2022 07:00  --------------------------------------------------------  IN: 1320 mL / OUT: 2350 mL / NET: -1030 mL    07 Aug 2022 07:01  -  07 Aug 2022 12:07  --------------------------------------------------------  IN: 240 mL / OUT: 0 mL / NET: 240 mL      GENERAL:  [x ]Alert  [x ]Oriented x  3  [ ]Lethargic  [ ]Cachexia  [ ]Unarousable  [ ]Verbal  [ ]Non-Verbal  Behavioral:   [ ] Anxiety  [ ] Delirium [ ] Agitation [x ] Other Calm  HEENT:  [x ]Normal   [ ]Dry mouth   [ ]ET Tube/Trach  [ ]Oral lesions  PULMONARY: No tachypnea  [  ]Clear [ ]Tachypnea  [ ]Audible excessive secretions   [ ]Rhonchi        [ ]Right [ ]Left [ ]Bilateral  [ ]Crackles        [ ]Right [ ]Left [ ]Bilateral  [ ]Wheezing     [ ]Right [ ]Left [ ]Bilatera  [ ]Diminished breath sounds [ ]right [ ]left [ ]bilateral  CARDIOVASCULAR:  No JVD  [  ]Regular [ ]Irregular [ ]Tachy  [ ]Blaine [ ]Murmur [ ]Other  GASTROINTESTINAL: Non distended  [  ]Soft  [ ]Distended   [ ]+BS   ]Non tender [ ]Tender  [ ]PEG [ ]OGT/ NGT  Last BM:   GENITOURINARY:  [ x]Normal [ ] Incontinent   [ ]Oliguria/Anuria   [ ]Gimenez  MUSCULOSKELETAL:   [x ]Normal   [ ]Weakness  [ ]Bed/Wheelchair bound [ ]Edema  NEUROLOGIC:   [ x]No focal deficits  [ ]Cognitive impairment  [ ]Dysphagia [ ]Dysarthria [ ]Paresis [ ]Other   SKIN:   [x ]Normal    [ ]Rash  [ ]Pressure ulcer(s)       Present on admission [ ]y [ ]n    CRITICAL CARE:  [ ] Shock Present  [ ]Septic [ ]Cardiogenic [ ]Neurologic [ ]Hypovolemic  [ ]  Vasopressors [ ]  Inotropes   [ ]Respiratory failure present [ ]Mechanical ventilation [ ]Non-invasive ventilatory support [ ]High flow  [ ]Acute  [ ]Chronic [ ]Hypoxic  [ ]Hypercarbic [ ]Other  [ ]Other organ failure     LABS:                        9.2    22.89 )-----------( 430      ( 06 Aug 2022 06:21 )             28.9   08-06    133<L>  |  92<L>  |  11  ----------------------------<  107<H>  3.7   |  26  |  <0.30<L>    Ca    8.0<L>      06 Aug 2022 06:21          RADIOLOGY & ADDITIONAL STUDIES:    PROTEIN CALORIE MALNUTRITION PRESENT: [ ]mild [ ]moderate [ ]severe [ ]underweight [ ]morbid obesity  https://www.andeal.org/vault/2440/web/files/ONC/Table_Clinical%20Characteristics%20to%20Document%20Malnutrition-White%20JV%20et%20al%202012.pdf    Height (cm): 157.5 (08-03-22 @ 12:16)  Weight (kg): 55.8 (08-03-22 @ 12:16)  BMI (kg/m2): 22.5 (08-03-22 @ 12:16)    [ ]PPSV2 < or = to 30% [ ]significant weight loss  [ ]poor nutritional intake  [ ]anasarca      [ ]Artificial Nutrition      REFERRALS:   [ ]Chaplaincy  [ ]Hospice  [ ]Child Life  [ ]Social Work  [ ]Case management [ ]Holistic Therapy     Goals of Care Document:

## 2022-08-08 NOTE — PROGRESS NOTE ADULT - PROBLEM SELECTOR PLAN 3
Patient has been following up with a oncologist in [place]  States she recently had a bone biopsy but doesn't have the results  will need to eventually start chemo +/- RT.

## 2022-08-08 NOTE — PROGRESS NOTE ADULT - SUBJECTIVE AND OBJECTIVE BOX
Orthopedic Surgery Progress Note     S: Patient seen and examined today. Pt complained of 4/10 R-sided inspiratory chest pain earlier this morning. EKG and CXR ordered, being performed when assessed at bedside.     MEDICATIONS  (STANDING):  enoxaparin Injectable 40 milliGRAM(s) SubCutaneous every 24 hours  fentaNYL   Patch  50 MICROgram(s)/Hr 1 Patch Transdermal every 72 hours  multivitamin 1 Tablet(s) Oral daily  naloxegol 25 milliGRAM(s) Oral daily  pantoprazole    Tablet 40 milliGRAM(s) Oral before breakfast  polyethylene glycol 3350 17 Gram(s) Oral two times a day  senna 2 Tablet(s) Oral every 12 hours    MEDICATIONS  (PRN):  aluminum hydroxide/magnesium hydroxide/simethicone Suspension 30 milliLiter(s) Oral four times a day PRN Indigestion  bisacodyl Suppository 10 milliGRAM(s) Rectal daily PRN Constipation  HYDROmorphone  Injectable 2 milliGRAM(s) IV Push every 2 hours PRN Severe Pain (7 - 10)  HYDROmorphone  Injectable 1.5 milliGRAM(s) IV Push every 2 hours PRN Moderate Pain (4 - 6)  magnesium hydroxide Suspension 30 milliLiter(s) Oral daily PRN Constipation  ondansetron Injectable 4 milliGRAM(s) IV Push every 6 hours PRN Nausea and/or Vomiting      Physical Exam:  NAD, AOx3  Abduction pillow in place  Chest: NTTP R-side chest   CV: Regular rate/rhythm   Pulm: R-sided anterior pleural rub  LE: NTTP calves, no evidence of DVT b/l, Prevena and HMV in place          Vital Signs Last 24 Hrs  T(C): 37.2 (08 Aug 2022 04:05), Max: 37.2 (08 Aug 2022 04:05)  T(F): 99 (08 Aug 2022 04:05), Max: 99 (08 Aug 2022 04:05)  HR: 103 (08 Aug 2022 04:05) (94 - 118)  BP: 118/63 (08 Aug 2022 04:05) (105/67 - 124/76)  BP(mean): --  RR: 18 (08 Aug 2022 04:05) (18 - 18)  SpO2: 95% (08 Aug 2022 04:05) (95% - 99%)    Parameters below as of 08 Aug 2022 04:05  Patient On (Oxygen Delivery Method): room air        08-06-22 @ 07:01  -  08-07-22 @ 07:00  --------------------------------------------------------  IN: 1320 mL / OUT: 2350 mL / NET: -1030 mL    08-07-22 @ 07:01  -  08-08-22 @ 05:58  --------------------------------------------------------  IN: 720 mL / OUT: 900 mL / NET: -180 mL                    LABS:                        9.2    22.89 )-----------( 430      ( 06 Aug 2022 06:21 )             28.9     08-06    133<L>  |  92<L>  |  11  ----------------------------<  107<H>  3.7   |  26  |  <0.30<L>    Ca    8.0<L>      06 Aug 2022 06:21      OTHER STUDIES:    EKG assessed at bedside. Sinus tachy 110-115. No ST elevations/depressions, irregular rhythms, etc. appreciated.

## 2022-08-08 NOTE — PROGRESS NOTE ADULT - SUBJECTIVE AND OBJECTIVE BOX
62y Female presents as direct transfer from [facility], c/o R hip pain that begain in May, then this past weekend was unable to bear weight and went to nearby hospital, where she was found to have R acetabular lesion, lung lesions and brain lesions per pt. R hip lesion was biopsied at prior hospital and consistent with metastatic lung cancer. Patient denies numbness. Denies trauma. Patient denies headstrike or LOC. Patient denies radiation of pain. Patient denies numbness/tingling/burning in the RLE. No other bone/joint complaints. Patient is a community ambulator at baseline without assistive devices. Patient seen now resting with continue complaint of pain in the right leg. Patient s/p CT scan which shows stool in the colon.  Pain seems better controlled. Patient found to have persistent tachycardia and LE edema.     MEDICATIONS  (STANDING):  enoxaparin Injectable 40 milliGRAM(s) SubCutaneous every 24 hours  fentaNYL   Patch  50 MICROgram(s)/Hr 1 Patch Transdermal every 72 hours  multivitamin 1 Tablet(s) Oral daily  naloxegol 25 milliGRAM(s) Oral daily  pantoprazole    Tablet 40 milliGRAM(s) Oral before breakfast  polyethylene glycol 3350 17 Gram(s) Oral two times a day  senna 2 Tablet(s) Oral every 12 hours    MEDICATIONS  (PRN):  aluminum hydroxide/magnesium hydroxide/simethicone Suspension 30 milliLiter(s) Oral four times a day PRN Indigestion  bisacodyl Suppository 10 milliGRAM(s) Rectal daily PRN Constipation  HYDROmorphone  Injectable 2 milliGRAM(s) IV Push every 2 hours PRN Severe Pain (7 - 10)  HYDROmorphone  Injectable 1.5 milliGRAM(s) IV Push every 2 hours PRN Moderate Pain (4 - 6)  magnesium hydroxide Suspension 30 milliLiter(s) Oral daily PRN Constipation  ondansetron Injectable 4 milliGRAM(s) IV Push every 6 hours PRN Nausea and/or Vomiting          VITALS:   T(C): 36.7 (22 @ 08:21), Max: 37.2 (22 @ 04:05)  HR: 90 (22 @ 08:21) (90 - 103)  BP: 109/68 (22 @ 08:21) (109/68 - 124/76)  RR: 18 (22 @ 08:21) (18 - 18)  SpO2: 96% (22 @ 08:21) (95% - 99%)  Wt(kg): --    PHYSICAL EXAM:  GENERAL: NAD, well-groomed, well-developed  HEAD:  Atraumatic, Normocephalic  EYES: EOMI, PERRLA, conjunctiva and sclera clear  ENMT: No tonsillar erythema, exudates, or enlargement; Moist mucous membranes, Good dentition, No lesions  NECK: Supple, No JVD, Normal thyroid  NERVOUS SYSTEM:  Alert & Oriented X3, Good concentration;   CHEST/LUNG: Clear to percussion bilaterally; No rales, rhonchi, wheezing, or rubs  HEART: Regular rate and rhythm; No murmurs, rubs, or gallops  ABDOMEN: Soft, Nontender, Nondistended; Bowel sounds present  EXTREMITIES:  2+ Peripheral Pulses, No clubbing, cyanosis, or edema  LYMPH: No lymphadenopathy noted  SKIN: No rashes or lesions      LABS:        CBC Full  -  ( 08 Aug 2022 09:25 )  WBC Count : 16.33 K/uL  RBC Count : 2.89 M/uL  Hemoglobin : 8.6 g/dL  Hematocrit : 26.6 %  Platelet Count - Automated : 501 K/uL  Mean Cell Volume : 92.0 fl  Mean Cell Hemoglobin : 29.8 pg  Mean Cell Hemoglobin Concentration : 32.3 gm/dL  Auto Neutrophil # : x  Auto Lymphocyte # : x  Auto Monocyte # : x  Auto Eosinophil # : x  Auto Basophil # : x  Auto Neutrophil % : x  Auto Lymphocyte % : x  Auto Monocyte % : x  Auto Eosinophil % : x  Auto Basophil % : x        133<L>  |  96  |  10  ----------------------------<  108<H>  4.1   |  29  |  0.37<L>    Ca    8.0<L>      08 Aug 2022 09:25          Urinalysis Basic - ( 08 Aug 2022 09:25 )    Color: Light Yellow / Appearance: Clear / S.007 / pH: x  Gluc: x / Ketone: Negative  / Bili: Negative / Urobili: Negative   Blood: x / Protein: Negative / Nitrite: Negative   Leuk Esterase: Large / RBC: 4 /hpf / WBC 60 /HPF   Sq Epi: x / Non Sq Epi: 0 /hpf / Bacteria: Many      CAPILLARY BLOOD GLUCOSE          RADIOLOGY & ADDITIONAL TESTS:

## 2022-08-08 NOTE — PROGRESS NOTE ADULT - SUBJECTIVE AND OBJECTIVE BOX
Patient is a 62y Female     Patient is a 62y old  Female who presents with a chief complaint of abl ct (07 Aug 2022 08:54)      HPI:  62y Female presents as direct transfer from [facility], c/o R hip pain that begain in May, then this past weekend was unable to bear weight and went to nearby hospital, where she was found to have R acetabular lesion, lung lesions and brain lesions per pt. R hip lesion was biopsied at prior hospital and consistent with metastatic lung cancer. Patient denies numbness. Denies trauma. Patient denies headstrike or LOC. Patient denies radiation of pain. Patient denies numbness/tingling/burning in the RLE. No other bone/joint complaints. Patient is a community ambulator at baseline without assistive devices.    PAST MEDICAL & SURGICAL HISTORY:    MEDICATIONS  (STANDING):  acetaminophen     Tablet .. 975 milliGRAM(s) Oral every 8 hours  enoxaparin Injectable 40 milliGRAM(s) SubCutaneous every 24 hours  senna 2 Tablet(s) Oral at bedtime  sodium chloride 0.9%. 1000 milliLiter(s) (75 mL/Hr) IV Continuous <Continuous>    MEDICATIONS  (PRN):  HYDROmorphone  Injectable 0.5 milliGRAM(s) IV Push every 6 hours PRN Severe Pain (7 - 10)  magnesium hydroxide Suspension 30 milliLiter(s) Oral daily PRN Constipation  melatonin 3 milliGRAM(s) Oral at bedtime PRN Insomnia  oxyCODONE    IR 2.5 milliGRAM(s) Oral every 4 hours PRN Moderate Pain (4 - 6)  oxyCODONE    IR 5 milliGRAM(s) Oral every 4 hours PRN Severe Pain (7 - 10)    Allergies    No Known Allergies    Intolerances                    T(C): 36.7 (07-28-22 @ 23:04), Max: 36.7 (07-28-22 @ 23:04)  HR: 114 (07-28-22 @ 23:04) (114 - 114)  BP: 126/87 (07-28-22 @ 23:04) (126/87 - 126/87)  RR: 18 (07-28-22 @ 23:04) (18 - 18)  SpO2: 93% (07-28-22 @ 23:04) (93% - 93%)  Wt(kg): --    PE   RLE:  Skin intact; No ecchymosis/soft tissue swelling  Compartments soft; + TTP about hip. No TTP to knee/leg/ankle/foot   ROM lmited 2/2 pain   Unable to SLR;   Motor intact GS/TA/FHL/EHL  SILT L2-S1  +dp    LLE/BUE:   No bony TTP; Good ROM w/o pain; Exam Unremarkable    Imaging:  XR and CT showing R acetabular lesion    62y Female with R acetabular lesion    - PWB RLE with rolling walker  - Pain control  - FU labs   - FU medicine and heme onc clearance  - Lovenox for dvt ppx  - Plan for OR Wednesday 8/3   (28 Jul 2022 23:54)      PAST MEDICAL & SURGICAL HISTORY:      MEDICATIONS  (STANDING):  enoxaparin Injectable 40 milliGRAM(s) SubCutaneous every 24 hours  fentaNYL   Patch  50 MICROgram(s)/Hr 1 Patch Transdermal every 72 hours  multivitamin 1 Tablet(s) Oral daily  naloxegol 25 milliGRAM(s) Oral daily  pantoprazole    Tablet 40 milliGRAM(s) Oral before breakfast  polyethylene glycol 3350 17 Gram(s) Oral two times a day  senna 2 Tablet(s) Oral every 12 hours      Allergies    No Known Allergies    Intolerances        SOCIAL HISTORY:  Denies ETOh,Smoking,     FAMILY HISTORY:      REVIEW OF SYSTEMS:    CONSTITUTIONAL: No weakness, fevers or chills  EYES/ENT: No visual changes;  No vertigo or throat pain   NECK: No pain or stiffness  RESPIRATORY: No cough, wheezing, hemoptysis; No shortness of breath  CARDIOVASCULAR: No chest pain or palpitations  GASTROINTESTINAL: No abdominal or epigastric pain. No nausea, vomiting, or hematemesis; No diarrhea or constipation. No melena or hematochezia.  GENITOURINARY: No dysuria, frequency or hematuria  NEUROLOGICAL: No numbness or weakness  SKIN: No itching, burning, rashes, or lesions   All other review of systems is negative unless indicated above.    VITAL:  T(C): , Max: 37.2 (08-08-22 @ 04:05)  T(F): , Max: 99 (08-08-22 @ 04:05)  HR: 90 (08-08-22 @ 08:21)  BP: 109/68 (08-08-22 @ 08:21)  BP(mean): --  RR: 18 (08-08-22 @ 08:21)  SpO2: 96% (08-08-22 @ 08:21)  Wt(kg): --    I and O's:    08-06 @ 07:01  -  08-07 @ 07:00  --------------------------------------------------------  IN: 1320 mL / OUT: 2350 mL / NET: -1030 mL    08-07 @ 07:01  -  08-08 @ 07:00  --------------------------------------------------------  IN: 1120 mL / OUT: 1500 mL / NET: -380 mL          PHYSICAL EXAM:    Constitutional: NAD  HEENT: PERRLA,   Neck: No JVD  Respiratory: CTA B/L  Cardiovascular: S1 and S2  Gastrointestinal: BS+, soft, NT/ND  Extremities: No peripheral edema  Neurological: A/O x 3, no focal deficits  Psychiatric: Normal mood, normal affect  : No Gimenez  Skin: No rashes  Access: Not applicable  Back: No CVA tenderness    LABS:                RADIOLOGY & ADDITIONAL STUDIES:

## 2022-08-08 NOTE — PROGRESS NOTE ADULT - SUBJECTIVE AND OBJECTIVE BOX
GAP TEAM PALLIATIVE CARE UNIT PROGRESS NOTE:        HPI:     62y Female presents as direct transfer from [facility], c/o R hip pain that begain in May, then this past weekend was unable to bear weight and went to nearby hospital, where she was found to have R acetabular lesion, lung lesions and brain lesions per pt. R hip lesion was biopsied at prior hospital and consistent with metastatic lung cancer. Patient denies numbness. Denies trauma. Patient denies headstrike or LOC. Patient denies radiation of pain. Patient denies numbness/tingling/burning in the RLE. No other bone/joint complaints. Patient is a community ambulator at baseline without assistive devices. Palliative following for pain control.     SUBJECTIVE:   Patient used dilaudid IV 2 five times over the past 24 hours. Pain is rated 9/10 when most severe and is primarily located in the R. groin area. Pain meds usually last ~2 hours however notes two episodes - yest and the day before when the meds did not even last 2 hours. Had BM yest.     DNR on chart:   Allergies    No Known Allergies    Intolerances    MEDICATIONS  (STANDING):  enoxaparin Injectable 40 milliGRAM(s) SubCutaneous every 24 hours  fentaNYL   Patch  50 MICROgram(s)/Hr 1 Patch Transdermal every 72 hours  multivitamin 1 Tablet(s) Oral daily  naloxegol 25 milliGRAM(s) Oral daily  pantoprazole    Tablet 40 milliGRAM(s) Oral before breakfast  polyethylene glycol 3350 17 Gram(s) Oral two times a day  senna 2 Tablet(s) Oral every 12 hours    MEDICATIONS  (PRN):  aluminum hydroxide/magnesium hydroxide/simethicone Suspension 30 milliLiter(s) Oral four times a day PRN Indigestion  bisacodyl Suppository 10 milliGRAM(s) Rectal daily PRN Constipation  HYDROmorphone  Injectable 2 milliGRAM(s) IV Push every 2 hours PRN Severe Pain (7 - 10)  HYDROmorphone  Injectable 1.5 milliGRAM(s) IV Push every 2 hours PRN Moderate Pain (4 - 6)  magnesium hydroxide Suspension 30 milliLiter(s) Oral daily PRN Constipation  ondansetron Injectable 4 milliGRAM(s) IV Push every 6 hours PRN Nausea and/or Vomiting    ITEMS UNCHECKED ARE NOT PRESENT    PRESENT SYMPTOMS: [ ]Unable to self-report  [ ]PAINADs [ ]RDOS  Source if other than patient:  [ ]Family   [ ]Team     Pain: [ ] yes [ ] no  QOL impact -   Location -                    Aggravating factors -  Quality -Te  Radiation -  Timing-  Severity (0-10 scale):G  Minimal acceptable level (0-10 scale):     PAINAD Score:  http://[email or web address]/cog/painad.pdf (Ctrl +  left click to view)    Dyspnea:                           [ ]Mild [ ]Moderate [ ]Severe    RDOS:  0 to 2  minimal or no respiratory distress   3  mild distress  4 to 6 moderate distress  >7 severe distress  https://[email or web address]/handouts/hen/Respiratory_Distress_Observation_Scale.pdf (Ctrl +  left click to view)     Anxiety:                             [ ]Mild [ ]Moderate [ ]Severe  Fatigue:                             [ ]Mild [ ]Moderate [ ]Severe  Nausea:                             [ ]Mild [ ]Moderate [ ]Severe  Loss of appetite:              [ ]Mild [ ]Moderate [ ]Severe  Constipation:                    [ ]Mild [ ]Moderate [ ]Severe    PCSSQ[Palliative Care Spiritual Screening Question]   Severity (0-10):  Score of 4 or > indicate consideration of Chaplaincy referral.  Chaplaincy Referral: [ ] yes [ ] refused [ ] following  		  Other Symptoms:  [ ]All other review of systems negative     Palliative Performance Status Version 2:         %         http://npcrc.org/files/news/palliative_performance_scale_[email or web address].pdf  PHYSICAL EXAM:   Vital Signs Last 24 Hrs  T(C): 36.7 (08 Aug 2022 08:21), Max: 37.2 (08 Aug 2022 04:05)  T(F): 98.1 (08 Aug 2022 08:21), Max: 99 (08 Aug 2022 04:05)  HR: 90 (08 Aug 2022 08:21) (90 - 118)  BP: 109/68 (08 Aug 2022 08:21) (105/67 - 124/76)  BP(mean): --  RR: 18 (08 Aug 2022 08:21) (18 - 18)  SpO2: 96% (08 Aug 2022 08:21) (95% - 99%)    Parameters below as of 08 Aug 2022 08:21  Patient On (Oxygen Delivery Method): room air     I&O's Summary    07 Aug 2022 07:01  -  08 Aug 2022 07:00  --------------------------------------------------------  IN: 1120 mL / OUT: 1500 mL / NET: -380 mL    08 Aug 2022 07:01  -  08 Aug 2022 11:55  --------------------------------------------------------  IN: 0 mL / OUT: 250 mL / NET: -250 mL      GENERAL: [ ] Cachexia  [ ]Alert  [ ]Oriented x   [ ]Lethargic  [ ]Unarousable  [ ]Verbal  [ ]Non-Verbal  Behavioral:   [ ] Anxiety  [ ] Delirium [ ] Agitation [ ] Other  HEENT:  [ ]Normal   [ ]Dry mouth   [ ]ET Tube/Trach  [ ]Oral lesions  PULMONARY:   [ ]Clear [ ]Tachypnea  [ ]Audible excessive secretions   [ ]Rhonchi        [ ]Right [ ]Left [ ]Bilateral  [ ]Crackles        [ ]Right [ ]Left [ ]Bilateral  [ ]Wheezing     [ ]Right [ ]Left [ ]Bilateral  [ ]Diminished BS [ ]Right [ ]Left [ ]Bilateral    CARDIOVASCULAR:    [ ]Regular [ ]Irregular [ ]Tachy  [ ]Blaine [ ]Murmur [ ]Other  GASTROINTESTINAL:  [ ]Soft  [ ]Distended   [ ]+BS  [ ]Non tender [ ]Tender  [ ]Other [ ]PEG [ ]OGT/ NGT   Last BM:    GENITOURINARY:  [ ]Normal [ ] Incontinent   [ ]Oliguria/Anuria   [ ]Gimenez  MUSCULOSKELETAL:   [ ]Normal   [ ]Weakness  [ ]Bed/Wheelchair bound [ ]Edema  NEUROLOGIC:   [ ]No focal deficits  [ ] Cognitive impairment  [ ] Dysphagia [ ]Dysarthria [ ] Paresis [ ]Other   SKIN:   [ ]Normal  [ ]Rash  [ ]Other  [ ]Pressure ulcer(s)  [ ]y [ ]n  Present on admission      CRITICAL CARE:  [ ] Shock Present  [ ]Septic [ ]Cardiogenic [ ]Neurologic [ ]Hypovolemic  [ ]  Vasopressors [ ]  Inotropes   [ ] Respiratory failure present [ ] Mechanical Ventilation [ ] Non-invasive ventilatory support [ ] High-Flow  [ ] Acute  [ ] Chronic [ ] Hypoxic  [ ] Hypercarbic [ ] Other  [ ] Other organ failure     LABS:                        8.6    16.33 )-----------( 501      ( 08 Aug 2022 09:25 )             26.6       133<L>  |  96  |  10  ----------------------------<  108<H>  4.1   |  29  |  0.37<L>    Ca    8.0<L>      08 Aug 2022 09:25        Urinalysis Basic - ( 08 Aug 2022 09:25 )    Color: Light Yellow / Appearance: Clear / S.007 / pH: x  Gluc: x / Ketone: Negative  / Bili: Negative / Urobili: Negative   Blood: x / Protein: Negative / Nitrite: Negative   Leuk Esterase: Large / RBC: 4 /hpf / WBC 60 /HPF   Sq Epi: x / Non Sq Epi: 0 /hpf / Bacteria: Many      RADIOLOGY & ADDITIONAL STUDIES:    PROTEIN CALORIE MALNUTRITION: [ ] mild [ ] moderate [ ] severe  [ ] underweight [ ] morbid obesity    https://www.andeal.org/vault/2440/web/files/ONC/Table_Clinical%20Characteristics%20to%20Document%20Malnutrition-White%20JV%20et%20al%2020.pdf    Height (cm): 157.5 (22 @ 12:16)  Weight (kg): 55.8 (22 @ 12:16)  BMI (kg/m2): 22.5 (22 @ 12:16)    [ ] PPSV2 < or = 30% [ ] significant weight loss [ ] poor nutritional intake [ ] anasarca   Artificial Nutrition [ ]     Other REFERRALS:    [ ] Hospice  [ ]Child Life  [ ]Social Work  [ ]Case management [ ]Holistic Therapy [ ] Physical Therapy [ ] Dietary   Goals of Care Document:  GAP TEAM PALLIATIVE CARE UNIT PROGRESS NOTE:        HPI:     62y Female presents as direct transfer from [facility], c/o R hip pain that begain in May, then this past weekend was unable to bear weight and went to nearby hospital, where she was found to have R acetabular lesion, lung lesions and brain lesions per pt. R hip lesion was biopsied at prior hospital and consistent with metastatic lung cancer. Patient denies numbness. Denies trauma. Patient denies headstrike or LOC. Patient denies radiation of pain. Patient denies numbness/tingling/burning in the RLE. No other bone/joint complaints. Patient is a community ambulator at baseline without assistive devices. Palliative following for pain control.     SUBJECTIVE:   Patient used dilaudid IV 2 five times over the past 24 hours. Pain is rated 9/10 when most severe and is primarily located in the R. groin area. Pain meds usually last ~2 hours however notes two episodes - yest and the day before when the meds did not even last 2 hours. Had BM yest. Pt wants pain controlled better before transitioning to PO.     DNR on chart:   Allergies    No Known Allergies    Intolerances    MEDICATIONS  (STANDING):  enoxaparin Injectable 40 milliGRAM(s) SubCutaneous every 24 hours  fentaNYL   Patch  50 MICROgram(s)/Hr 1 Patch Transdermal every 72 hours  multivitamin 1 Tablet(s) Oral daily  naloxegol 25 milliGRAM(s) Oral daily  pantoprazole    Tablet 40 milliGRAM(s) Oral before breakfast  polyethylene glycol 3350 17 Gram(s) Oral two times a day  senna 2 Tablet(s) Oral every 12 hours    MEDICATIONS  (PRN):  aluminum hydroxide/magnesium hydroxide/simethicone Suspension 30 milliLiter(s) Oral four times a day PRN Indigestion  bisacodyl Suppository 10 milliGRAM(s) Rectal daily PRN Constipation  HYDROmorphone  Injectable 2 milliGRAM(s) IV Push every 2 hours PRN Severe Pain (7 - 10)  HYDROmorphone  Injectable 1.5 milliGRAM(s) IV Push every 2 hours PRN Moderate Pain (4 - 6)  magnesium hydroxide Suspension 30 milliLiter(s) Oral daily PRN Constipation  ondansetron Injectable 4 milliGRAM(s) IV Push every 6 hours PRN Nausea and/or Vomiting    ITEMS UNCHECKED ARE NOT PRESENT    PRESENT SYMPTOMS: [ ]Unable to self-report  [ ]PAINADs [ ]RDOS  Source if other than patient:  [ ]Family   [ ]Team     Pain: [X] yes [ ] no  QOL impact -   Location - R. hip pain                    Aggravating factors - movement  Quality -sharp  Radiation - leg   Timing- with movement; also experiencing pain 2 hours after dilaudid is given   Severity (0-10 scale): severe   Minimal acceptable level (0-10 scale): 3-4    PAINAD Score:  http://geriatrictoolkit.[email or web address]/cog/painad.pdf (Ctrl +  left click to view)    Dyspnea: None                          [ ]Mild [ ]Moderate [ ]Severe    RDOS:  0 to 2  minimal or no respiratory distress   3  mild distress  4 to 6 moderate distress  >7 severe distress  https://[email or web address]/handouts/hen/Respiratory_Distress_Observation_Scale.pdf (Ctrl +  left click to view)     Anxiety:                             [ ]Mild [ ]Moderate [ ]Severe  Fatigue:                             [ ]Mild [ ]Moderate [ ]Severe  Nausea:                             [ ]Mild [ ]Moderate [ ]Severe  Loss of appetite:              [ ]Mild [ ]Moderate [ ]Severe  Constipation:                    [X]Mild [ ]Moderate [ ]Severe    PCSSQ[Palliative Care Spiritual Screening Question]   Severity (0-10): N/A   Score of 4 or > indicate consideration of Chaplaincy referral.  Chaplaincy Referral: [ ] yes [ ] refused [ ] following  		  Other Symptoms:  [X ]All other review of systems negative     Palliative Performance Status Version 2:   40      %         http://[email or web address]/files/news/palliative_performance_scale_[email or web address].pdf  PHYSICAL EXAM:   Vital Signs Last 24 Hrs  T(C): 36.7 (08 Aug 2022 08:21), Max: 37.2 (08 Aug 2022 04:05)  T(F): 98.1 (08 Aug 2022 08:21), Max: 99 (08 Aug 2022 04:05)  HR: 90 (08 Aug 2022 08:21) (90 - 118)  BP: 109/68 (08 Aug 2022 08:21) (105/67 - 124/76)  BP(mean): --  RR: 18 (08 Aug 2022 08:21) (18 - 18)  SpO2: 96% (08 Aug 2022 08:21) (95% - 99%)    Parameters below as of 08 Aug 2022 08:21  Patient On (Oxygen Delivery Method): room air     I&O's Summary    07 Aug 2022 07:01  -  08 Aug 2022 07:00  --------------------------------------------------------  IN: 1120 mL / OUT: 1500 mL / NET: -380 mL    08 Aug 2022 07:01  -  08 Aug 2022 11:55  --------------------------------------------------------  IN: 0 mL / OUT: 250 mL / NET: -250 mL      GENERAL:   [x ]Alert  [x ]Oriented x  3  [ ]Lethargic  [ ]Cachexia  [ ]Unarousable  [ ]Verbal  [ ]Non-Verbal  Behavioral:   [ ] Anxiety  [ ] Delirium [ ] Agitation [x ] Other Calm  HEENT:  [x ]Normal   [ ]Dry mouth   [ ]ET Tube/Trach  [ ]Oral lesions  PULMONARY: No tachypnea  [  ]Clear [ ]Tachypnea  [ ]Audible excessive secretions   [ ]Rhonchi        [ ]Right [ ]Left [ ]Bilateral  [ ]Crackles        [ ]Right [ ]Left [ ]Bilateral  [ ]Wheezing     [ ]Right [ ]Left [ ]Bilatera  [ ]Diminished breath sounds [ ]right [ ]left [ ]bilateral  CARDIOVASCULAR:  No JVD  [  ]Regular [ ]Irregular [ ]Tachy  [ ]Blaine [ ]Murmur [ ]Other  GASTROINTESTINAL: Non distended  [  ]Soft  [ ]Distended   [ ]+BS   ]Non tender [ ]Tender  [ ]PEG [ ]OGT/ NGT  Last BM: yest    LABS:                        8.6    16.33 )-----------( 501      ( 08 Aug 2022 09:25 )             26.6       133<L>  |  96  |  10  ----------------------------<  108<H>  4.1   |  29  |  0.37<L>    Ca    8.0<L>      08 Aug 2022 09:25        Urinalysis Basic - ( 08 Aug 2022 09:25 )    Color: Light Yellow / Appearance: Clear / S.007 / pH: x  Gluc: x / Ketone: Negative  / Bili: Negative / Urobili: Negative   Blood: x / Protein: Negative / Nitrite: Negative   Leuk Esterase: Large / RBC: 4 /hpf / WBC 60 /HPF   Sq Epi: x / Non Sq Epi: 0 /hpf / Bacteria: Many      RADIOLOGY & ADDITIONAL STUDIES:    PROTEIN CALORIE MALNUTRITION: [ ] mild [ ] moderate [ ] severe  [ ] underweight [ ] morbid obesity    https://www.andeal.org/vault/2440/web/files/ONC/Table_Clinical%20Characteristics%20to%20Document%20Malnutrition-White%20JV%20et%20al%2020.pdf    Height (cm): 157.5 (22 @ 12:16)  Weight (kg): 55.8 (22 @ 12:16)  BMI (kg/m2): 22.5 (22 @ 12:16)    [ ] PPSV2 < or = 30% [ ] significant weight loss [ ] poor nutritional intake [ ] anasarca   Artificial Nutrition [ ]     Other REFERRALS:    [ ] Hospice  [ ]Child Life  [ ]Social Work  [ ]Case management [ ]Holistic Therapy [ ] Physical Therapy [ ] Dietary   Goals of Care Document:    SUBJECTIVE/OBJECTIVE:   Patient used dilaudid IV 2mg five times over the past 24 hours. Pain is rated 9/10 when most severe and is primarily located in the R. groin area. Pain meds usually last ~2 hours however notes two episodes - yest and the day before when the meds did not even last 2 hours. Had BM yest. Pt wants pain controlled better before transitioning to PO.     DNR on chart:   Allergies    No Known Allergies    Intolerances    MEDICATIONS  (STANDING):  enoxaparin Injectable 40 milliGRAM(s) SubCutaneous every 24 hours  fentaNYL   Patch  50 MICROgram(s)/Hr 1 Patch Transdermal every 72 hours  multivitamin 1 Tablet(s) Oral daily  naloxegol 25 milliGRAM(s) Oral daily  pantoprazole    Tablet 40 milliGRAM(s) Oral before breakfast  polyethylene glycol 3350 17 Gram(s) Oral two times a day  senna 2 Tablet(s) Oral every 12 hours    MEDICATIONS  (PRN):  aluminum hydroxide/magnesium hydroxide/simethicone Suspension 30 milliLiter(s) Oral four times a day PRN Indigestion  bisacodyl Suppository 10 milliGRAM(s) Rectal daily PRN Constipation  HYDROmorphone  Injectable 2 milliGRAM(s) IV Push every 2 hours PRN Severe Pain (7 - 10)  HYDROmorphone  Injectable 1.5 milliGRAM(s) IV Push every 2 hours PRN Moderate Pain (4 - 6)  magnesium hydroxide Suspension 30 milliLiter(s) Oral daily PRN Constipation  ondansetron Injectable 4 milliGRAM(s) IV Push every 6 hours PRN Nausea and/or Vomiting    ITEMS UNCHECKED ARE NOT PRESENT    PRESENT SYMPTOMS: [ ]Unable to self-report  [ ]PAINADs [ ]RDOS  Source if other than patient:  [ ]Family   [ ]Team     Pain: [X] yes [ ] no  QOL impact -   Location - R. hip pain                    Aggravating factors - movement  Quality -sharp  Radiation - leg   Timing- with movement; also experiencing pain 2 hours after dilaudid is given   Severity (0-10 scale): severe   Minimal acceptable level (0-10 scale): 3-4    PAINAD Score:  http://geriatrictoolkit.[facility]/cog/painad.pdf (Ctrl +  left click to view)    Dyspnea: None                          [ ]Mild [ ]Moderate [ ]Severe    RDOS:  0 to 2  minimal or no respiratory distress   3  mild distress  4 to 6 moderate distress  >7 severe distress  https://[email or web address]/handouts/hen/Respiratory_Distress_Observation_Scale.pdf (Ctrl +  left click to view)     Anxiety:                             [ ]Mild [ ]Moderate [ ]Severe  Fatigue:                             [ ]Mild [ ]Moderate [ ]Severe  Nausea:                             [ ]Mild [ ]Moderate [ ]Severe  Loss of appetite:              [ ]Mild [ ]Moderate [ ]Severe  Constipation:                    [X]Mild [ ]Moderate [ ]Severe    PCSSQ[Palliative Care Spiritual Screening Question]   Severity (0-10): Not assessed due to acute physical pain  Score of 4 or > indicate consideration of Chaplaincy referral.  Chaplaincy Referral: [ ] yes [ ] refused [ ] following  		  Other Symptoms:  [X ]All other review of systems negative     Palliative Performance Status Version 2:   40      %         http://[email or web address]/files/news/palliative_performance_scale_[email or web address].pdf  PHYSICAL EXAM:   Vital Signs Last 24 Hrs  T(C): 36.7 (08 Aug 2022 08:21), Max: 37.2 (08 Aug 2022 04:05)  T(F): 98.1 (08 Aug 2022 08:21), Max: 99 (08 Aug 2022 04:05)  HR: 90 (08 Aug 2022 08:21) (90 - 118)  BP: 109/68 (08 Aug 2022 08:21) (105/67 - 124/76)  BP(mean): --  RR: 18 (08 Aug 2022 08:21) (18 - 18)  SpO2: 96% (08 Aug 2022 08:21) (95% - 99%)    Parameters below as of 08 Aug 2022 08:21  Patient On (Oxygen Delivery Method): room air     I&O's Summary    07 Aug 2022 07:01  -  08 Aug 2022 07:00  --------------------------------------------------------  IN: 1120 mL / OUT: 1500 mL / NET: -380 mL    08 Aug 2022 07:01  -  08 Aug 2022 11:55  --------------------------------------------------------  IN: 0 mL / OUT: 250 mL / NET: -250 mL      GENERAL:   GENERAL:  [x]Alert  [x]Oriented x 3  [ ]Lethargic  [ ]Cachexia  [ ]Unarousable  [x]Verbal  [ ]Non-Verbal  Behavioral:   [ ]Anxiety  [ ]Delirium [ ]Agitation [x ]Other- calm  HEENT:  [x]Normal   [ ]Dry mouth   [ ]ET Tube/Trach  [ ]Oral lesions  PULMONARY:   [x]Clear [ ]Tachypnea  [ ]Audible excessive secretions   [ ]Rhonchi        [ ]Right [ ]Left [ ]Bilateral  [ ]Crackles        [ ]Right [ ]Left [ ]Bilateral  [ ]Wheezing     [ ]Right [ ]Left [ ]Bilateral  [ ]Diminished BS [ ] Right [ ]Left [ ]Bilateral  CARDIOVASCULAR:    [x]Regular [ ]Irregular [ ]Tachy  [ ]Blaine [ ]Murmur [ ]Other  GASTROINTESTINAL:  [x]Soft  [ ]Distended   [x]+BS  [x]Non tender [ ]Tender  [ ]PEG [ ]OGT/ NGT   Last BM:    GENITOURINARY:  [x]Normal [ ]Incontinent   [ ]Oliguria/Anuria   [ ]Gimenez  MUSCULOSKELETAL:   [ ]Normal   [x]Weakness  [ ]Bed/Wheelchair bound [ ]Edema  NEUROLOGIC:   [x]No focal deficits  [ ] Cognitive impairment  [ ] Dysphagia [ ]Dysarthria [ ] Paresis [ ]Other     LABS:                        8.6    16.33 )-----------( 501      ( 08 Aug 2022 09:25 )             26.6   08-08    133<L>  |  96  |  10  ----------------------------<  108<H>  4.1   |  29  |  0.37<L>    Ca    8.0<L>      08 Aug 2022 09:25        Urinalysis Basic - ( 08 Aug 2022 09:25 )    Color: Light Yellow / Appearance: Clear / S.007 / pH: x  Gluc: x / Ketone: Negative  / Bili: Negative / Urobili: Negative   Blood: x / Protein: Negative / Nitrite: Negative   Leuk Esterase: Large / RBC: 4 /hpf / WBC 60 /HPF   Sq Epi: x / Non Sq Epi: 0 /hpf / Bacteria: Many      RADIOLOGY & ADDITIONAL STUDIES:  < from: Xray Chest 1 View- PORTABLE-Urgent (Xray Chest 1 View- PORTABLE-Urgent .) (22 @ 06:43) >  ACC: [id number] EXAM:  XR CHEST PORTABLE URGENT 1V                          PROCEDURE DATE:  2022          INTERPRETATION:  EXAMINATION: XR CHEST URGENT    CLINICAL INDICATION: Chest pain with inspiration.    TECHNIQUE: Single frontal, portableview of the chest was obtained.    COMPARISON: Chest x-ray 2022. CT abdomen and pelvis 2022    FINDINGS:  The heart is normal in size.  Small right pleural effusion with associated atelectasis.  No pneumothorax.    IMPRESSION:  Small right pleural effusion with associated atelectasis.    --- End of Report ---           ROD MARTINEZ MD; Resident Radiologist  This document has been electronically signed.  PUMA KAM MD; Attending Interventional Radiologist  This document has been electronically signed. Aug  8 2022 10:17AM    < end of copied text >    PROTEIN CALORIE MALNUTRITION: [ ] mild [ ] moderate [ ] severe  [ ] underweight [ ] morbid obesity    https://www.andeal.org/vault/2440/web/files/ONC/Table_Clinical%20Characteristics%20to%20Document%20Malnutrition-White%20JV%20et%20al%2020.pdf    Height (cm): 157.5 (22 @ 12:16)  Weight (kg): 55.8 (22 @ 12:16)  BMI (kg/m2): 22.5 (22 @ 12:16)    [ ] PPSV2 < or = 30% [ ] significant weight loss [ ] poor nutritional intake [ ] anasarca   Artificial Nutrition [ ]     Other REFERRALS:    [ ] Hospice  [ ]Child Life  [ ]Social Work  [ ]Case management [ ]Holistic Therapy [ ] Physical Therapy [ ] Dietary   Goals of Care Document:

## 2022-08-08 NOTE — PROGRESS NOTE ADULT - PROBLEM SELECTOR PLAN 2
On mirlalax bid  add senna bid  On naloxegol  Can consider lactulose if no improvement Last recorded BM 8/7  On miralax bid  On senna BID  On naloxegol

## 2022-08-08 NOTE — PROGRESS NOTE ADULT - SUBJECTIVE AND OBJECTIVE BOX
Stable comfortable out of bed to a chair , ambulating few steps with a walker.  Stable hemodynamically. Hemovac removed . recommended to get a chest CT to look for right pleural effusion.

## 2022-08-08 NOTE — PROGRESS NOTE ADULT - PROBLEM SELECTOR PLAN 6
Will continue to follow for ongoing symptom management. Recommendations discussed with primary team.

## 2022-08-08 NOTE — PROGRESS NOTE ADULT - PROBLEM SELECTOR PLAN 5
Actions:  [x] Rapport building     [x] Symptom assessment    [] Eliciting preferences of goals   [] Prognostic understanding    [x] Emotional Support  [] Coping skill development  []  Other  Interdisciplinary Referrals: [facility] SW for support  Communication: n/a  Documentation Review: [x] Primary Team [] Consultants [] Interdisciplinary team  Content: n/a plan for DMT as outpatient

## 2022-08-08 NOTE — PROGRESS NOTE ADULT - ASSESSMENT
61F presenting w R acetabular lesion 2/2 met lunch cancer s/p R mass excision and complex AMANDEEP, POD#5. Reporting R-sided anterior chest pain 4/10 earlier this morning. EKG sinus tachy otherwise normal. Has Prevena and HMV in place.      - F/u CXR  - Prevena to [facility] today  - WBAT, OOB today  - Anterior hip precautions  - Abd pillow  - F/u OR path  - F/u skeletal survey

## 2022-08-08 NOTE — PROGRESS NOTE ADULT - ASSESSMENT
61 yo F with metastatic lung cancer to bone and brain p/w R pathologic hip fracture s/p wide resection and total hip replacement on 8/3. Palliative following for pain management.

## 2022-08-08 NOTE — PROGRESS NOTE ADULT - PROBLEM SELECTOR PLAN 1
Predominant symptom: pain  Likely due to pathologic fracture  Duration of PRN: 2 hours    Recommendations:   increase fentanyl patch from 50 mcg/hr to 75 mcg/hr  add IVP dilaudid 2.5 mg q 2 for breakthrough pain   continue IVP dilaudid 1.5 mg PRN for moderate pain  continue IVP dilaudid 2.0 mg PRN for severe pain  Will consider po dilaudid transition tomorrow if pt amenable and pain is better controlled   Risk mitigation/Bowel regimen: c/w bowel regimen with senna, miraalax and naloxogel as ordered and monitor BM's  Adverse events noted: non reported or observed  Recommendations discussed with primary team Predominant symptom: pain  Likely due to pathologic fracture  Duration of PRN: 2 hours    Recommendations:   Increase fentanyl patch from 50 mcg/hr to 75 mcg/hr  add IVP dilaudid 2.5 mg q 2 for breakthrough pain   continue IVP dilaudid 1.5 mg PRN for moderate pain  continue IVP dilaudid 2.0 mg PRN for severe pain  narcan prn  Will consider po dilaudid transition tomorrow if pt amenable and pain is better controlled   Risk mitigation/Bowel regimen: c/w bowel regimen with senna, miraalax and naloxogel as ordered and monitor BM's  Adverse events noted: non reported or observed  Recommendations discussed with primary team

## 2022-08-09 NOTE — DIETITIAN NUTRITION RISK NOTIFICATION - TREATMENT: THE FOLLOWING DIET HAS BEEN RECOMMENDED
Diet, Regular (08-03-22 @ 19:29) [Active]  Diet, Clear Liquid (08-03-22 @ 17:59) [Available for Activation]

## 2022-08-09 NOTE — CONSULT NOTE ADULT - SUBJECTIVE AND OBJECTIVE BOX
[facility] - [facility] - CONSULT NOTE    HPI: 62 year old gentlewoman, former smoker, recently diagnosed with metastatic lung cancer on a biopsy of right hip lesion with evidence of disease in the brain. The patient was started on radiation therapy which was stopped after only two sessions due to the patient's inability to bear weight. She was transferred to [place] for further treatment and evaluation. She was treated for narcotic induced Patrick's syndrome with golytely, reglan and a decrease in narcotic analgesics. Further imaging revealed a large soft tissue mass with gross destruction of the right acetabulum and the inferior and superior pubic rami highly concerning for malignancy - the soft tissue mass is inseparable from a small portion the right side of the bladder and invasion cannot be excluded - lytic lesion in L1 vertebral body. The patient underwent right acetabular mass excision with a complex right total hip replacement on 8/3. She has now developed protein calorie malnutrition. She has ongoing pain in the right leg. She continues to have colonic inertia with constipation due to ongoing narcotic use. She has urinary retention requiring placement of a olmos catheter. She has persistent tachycardia without shortness of breath or hypoxemia      PMHX:  no significant past medical history       PSHX:  no significant past surgical history     FAMILY HISTORY:  no significant past medical history in first degree relatives    SOCIAL HISTORY:  former smoker    Pulmonary Medications:       Antimicrobials:      Cardiology:      Other:  enoxaparin Injectable 40 milliGRAM(s) SubCutaneous every 24 hours  fentaNYL   Patch  75 MICROgram(s)/Hr 1 Patch Transdermal every 72 hours  multivitamin 1 Tablet(s) Oral daily  naloxegol 25 milliGRAM(s) Oral daily  pantoprazole    Tablet 40 milliGRAM(s) Oral before breakfast  polyethylene glycol 3350 17 Gram(s) Oral two times a day  senna 2 Tablet(s) Oral every 12 hours      Prn:  MEDICATIONS  (PRN):  aluminum hydroxide/magnesium hydroxide/simethicone Suspension 30 milliLiter(s) Oral four times a day PRN Indigestion  bisacodyl Suppository 10 milliGRAM(s) Rectal daily PRN Constipation  HYDROmorphone  Injectable 2.5 milliGRAM(s) IV Push every 2 hours PRN breakthrough Pain  HYDROmorphone  Injectable 2 milliGRAM(s) IV Push every 2 hours PRN Severe Pain (7 - 10)  HYDROmorphone  Injectable 1.5 milliGRAM(s) IV Push every 2 hours PRN Moderate Pain (4 - 6)  magnesium hydroxide Suspension 30 milliLiter(s) Oral daily PRN Constipation  ondansetron Injectable 4 milliGRAM(s) IV Push every 6 hours PRN Nausea and/or Vomiting      Allergies    No Known Allergies    HOME MEDICATIONS: see  H & P    REVIEW OF SYSTEMS:  Constitutional: As per HPI  HEENT: Within normal limits  CV: As per HPI  Resp: As per HPI  GI: bowel inertia/constipation due to narcotics  : Within normal limits  Musculoskeletal: Within normal limits  Skin: Within normal limits  Neurological: Within normal limits  Psychiatric: Within normal limits  Endocrine: Within normal limits  Hematologic/Lymphatic: metastatic lung cancer  Allergic/Immunologic: Within normal limits    [x] All other systems negative    OBJECTIVE:    PHYSICAL EXAM:  ICU Vital Signs Last 24 Hrs  T(C): 36.7 (09 Aug 2022 16:06), Max: 36.8 (08 Aug 2022 20:12)  T(F): 98 (09 Aug 2022 16:06), Max: 98.2 (08 Aug 2022 20:12)  HR: 118 (09 Aug 2022 16:06) (108 - 118)  BP: 110/73 (09 Aug 2022 16:06) (108/74 - 127/77)  BP(mean): --  ABP: --  ABP(mean): --  RR: 18 (09 Aug 2022 16:06) (18 - 18)  SpO2: 96% (09 Aug 2022 16:06) (94% - 96%) on room air    General: Awake. Alert. Cooperative. No distress. Appears stated age 	  HEENT:   Atraumatic. Normocephalic. Anicteric. Normal oral mucosa. PERRL. EOMI.  Neck: Supple. Trachea midline. Thyroid without enlargement/tenderness/nodules. No carotid bruit. No JVD.	  Cardiovascular: Regular rate and rhythm. S1 S2 normal. No murmurs, rubs or gallops.  Respiratory: Respirations unlabored. Clear to auscultation and percussion bilaterally. No curvature.  Abdomen: Soft. Non-tender. Non-distended. No organomegaly. No masses. Normal bowel sounds.  Extremities: Warm to touch. No clubbing or cyanosis. No pedal edema.  Pulses: 2+ peripheral pulses all extremities.	  Skin: Normal skin color. No rashes or lesions. No ecchymoses. No cyanosis. Warm to touch.  Lymph Nodes: Cervical, supraclavicular and axillary nodes normal  Neurological: Motor and sensory examination equal and normal. A and O x 3  Psychiatry: Appropriate mood and affect.      LABS:                          8.2    18.59 )-----------( 592      ( 09 Aug 2022 06:59 )             25.9     CBC    WBC  18.59 <==, 16.33 <==, 22.89 <==, 21.21 <==, 14.85 <==, 11.61 <==, 11.31 <==    Hemoglobin  8.2 <<==, 8.6 <<==, 9.2 <<==, 9.1 <<==, 9.0 <<==, 9.8 <<==, 8.7 <<==    Hematocrit  25.9 <==, 26.6 <==, 28.9 <==, 28.4 <==, 28.0 <==, 29.6 <==, 27.0 <==    Platelets  592 <==, 501 <==, 430 <==, 385 <==, 347 <==, 316 <==, 519 <==      135  |  97  |  9   ----------------------------<  123<H>    0809  4.1   |  31  |  0.37<L>    LYTES    sodium  135 <==, 133 <==, 133 <==, 134 <==, 135 <==, 137 <==, 134 <==    potassium   4.1 <==, 4.1 <==, 3.7 <==, 3.8 <==, 4.0 <==, 3.7 <==, 4.0 <==    chloride  97 <==, 96 <==, 92 <==, 97 <==, 99 <==, 100 <==, 96 <==    carbon dioxide  31 <==, 29 <==, 26 <==, 26 <==, 22 <==, 37 <==, 25 <==    =============================================================================================  RENAL FUNCTION:    Creatinine:   0.37  <<==, 0.37  <<==, <0.30  <<==, 0.41  <<==, 0.37  <<==, 0.42  <<==, 0.38  <<==    BUN:   9 <==, 10 <==, 11 <==, 14 <==, 18 <==, 21 <==, 22 <==    ============================================================================================    calcium   8.0 <==, 8.0 <==, 8.0 <==, 7.8 <==, 7.8 <==, 7.7 <==, 8.2 <==    ===========================================================================================  MICROBIOLOGY:     COVID-19 PCR . (22 @ 21:22)   COVID-19 PCR: NotDetec:      Urinalysis Basic - ( 08 Aug 2022 09:25 )    Color: Light Yellow / Appearance: Clear / S.007 / pH: x  Gluc: x / Ketone: Negative  / Bili: Negative / Urobili: Negative   Blood: x / Protein: Negative / Nitrite: Negative   Leuk Esterase: Large / RBC: 4 /hpf / WBC 60 /HPF   Sq Epi: x / Non Sq Epi: 0 /hpf / Bacteria: Many    Culture - Urine (22 @ 11:28)   Specimen Source: Catheterized Catheterized   Culture Results:   >100,000 CFU/ml Enterobacter cloacae complex     RADIOLOGY:  [x ] Chest radiographs reviewed and interpreted by me    EXAM:  XR CHEST PORTABLE URGENT 1V                          PROCEDURE DATE:  2022      FINDINGS:  The heart is normal in size.  Small right pleural effusion with associated atelectasis.  No pneumothorax.    IMPRESSION:  Small right pleural effusion with associated atelectasis.    ROD MARTINEZ MD; Resident Radiologist  This document has been electronically signed.  PUMA KAM MD; Attending Interventional Radiologist  This document has been electronically signed. Aug  8 2022 10:17AM  ---------------------------------------------------------------------------------------------------------------  EXAM:  CT ANGIO CHEST PULM ART [facility]                          PROCEDURE DATE:  2022      FINDINGS:    PULMONARY ANGIOGRAM:  No pulmonary embolism.    LUNGS/AIRWAYS/PLEURA: No endobronchial lesion. Several bilateral lung   nodules for example, largest 1.8 cm in the right lower lobe (5-65).   Peripheral and peribronchovascular groundglass in the upper lobes. Mild   emphysema. Right lower lobe passive atelectasis. Small right pleural   effusion in association with thickening of the parietal pleura (for   example, 5-106).    LYMPH NODES/MEDIASTINUM: Right hilar and perihilar lymph nodes up to 1.2   cm. Fluid within the esophagus up to the aortic arch. Mild esophageal   wall thickening.    HEART/VASCULATURE: Normal heart size. Unremarkable pericardium. Normal   caliber aorta.    UPPER ABDOMEN: Stable hepatic cyst and bilateral adrenal thickening.   Cholecystectomy.    BONES/SOFT TISSUES: Multiple bone lesions, for example, the L1 vertebral   body and the left ninth rib. Asymmetric nodular density in the upper   outer left breast.      IMPRESSION:    No pulmonary embolism.    Several bilateral lung nodules, compatible with metastases. Concurrent   mild groundglass in the upper lobes, likely infectious or inflammatory.    Small right pleural effusion associated with right pleural thickening,   raising suspicion for a malignant pleural effusion.    Right hilar/perihilar lymphadenopathy.    Multiple bone metastases.    Asymmetrically dense upper outer left breast, not well characterized with   CT.    ANNEI SHEN M.D., ATTENDING RADIOGIST  This document has been electronically signed. Aug  9 2022  8:32AM    ---------------------------------------------------------------------------------------------------------------  EXAM:  DUPLEX SCAN EXT VEINS LOWER BI                          PROCEDURE DATE:  2022      IMPRESSION:  No evidence of deep venous thrombosis in either lower extremity.    FLACA MCCLELLAND MD; Attending Radiologist  This document has been electronically signed. Aug  9 2022  1:42PM  ---------------------------------------------------------------------------------------------------------------  EXAM:  CT ABDOMEN AND PELVIS                          PROCEDURE DATE:  2022      IMPRESSION:  Large soft tissue mass with gross destruction of the right acetabulum,   and inferior and superior pubic rami highly concerning for malignancy.   The soft tissue mass is inseparable from a small portion the right side   of the bladder and invasion cannot be excluded.. Lytic lesion in L1   vertebral body. Multiple bilateral pleural nodules highly concerning for   metastatic disease. Moderate right pleural effusion with mild posterior   pleural thickening.. Pleural-based metastases cannot be excluded. Right   external iliac lymph node raises concern for metastatic disease.    Gross distention of the ascending colon. There is a large amount of stool   and this may be due to fecal impaction. Binford syndrome would be   considered if fecal impaction is relieved and dilatation persisted.   Please correlate clinically.    Mild bilateral hydronephrosis which may be due to the very distended   bladder.    ANNIE MENJIVAR MD; Attending Radiologist  This document has been electronically signed. 2022  5:12PM  ---------------------------------------------------------------------------------------------------------------  EXAM:  NM BONE SPECT CT SA SD                        EXAM:  NM BONE IMG WHOLE BODY                          PROCEDURE DATE:  2022      IMPRESSION: Abnormal bone scan.    Mildly heterogeneous tracer activity in the right anterior acetabulum   corresponding to sclerotic density on CT. Destructive lesion in the right   posterior acetabulum and right ischium with difficult to delineate soft   tissue component on CT are not visualized on the bone scan.    Mildly heterogeneous tracer activity in the right proximal femur with no   definite abnormality on CT.    GLENDA RUIZ MD; Attending Nuclear Medicine  This document has been electronically signed. 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 PET/CT -> 2cm FDG avid superior segment of the right lower lobe nodule associated with hypermetabolic right hilar adenopathy - trace right pleural effusion with metastatic disease to the right pleura - 7cm destructive osseous lesion involving the right acetabulum - hypermetabolic lytic lesion in the L1 vertebral body. Brain MRI -> 3 subcentimeter metastases the largest being 8mm in the left frontal lobe - no vasogenic edema. The patient was started on radiation therapy which was stopped after only two sessions due to the patient's inability to bear weight related to intractable pain. She was transferred to [place] for further treatment and evaluation. Imaging revealed a large soft tissue mass with gross destruction of the right acetabulum and the inferior and superior pubic rami highly concerning for malignancy - the soft tissue mass is inseparable from a small portion the right side of the bladder and invasion cannot be excluded - lytic lesion in L1 vertebral body. The patient underwent right acetabular mass excision with a complex right total hip replacement on 8/3. Post-operatively, she has been treated for narcotic induced Grady's syndrome with golyte and reglan. She had a bowel movement yesterday. She is receiving nutritional support for protein calorie malnutrition. The pain in her right leg is somewhat improved and she walked in the hallway with physical therapy yesterday. Urinary retention has required placement of a olmos catheter. She has persistent tachycardia without shortness of breath or hypoxemia on room air. She has no cough, sputum production, chest congestion or wheeze. No fevers, chills or sweats. No fatigue, malaise or weakness. She has no anterior chest pain/pressure or palpitations. She has right sided chest pain exacerbated by deep inspiration. Chest radiographs are abnormal. Asked to evaluate      PMHX:  lung cancer - metastatic  nephrolithiasis with hematuria    PSHX:  orthopedic hand surgery - bilateral  right pelvis biopsy    FAMILY HISTORY:  no significant past medical history in first degree relatives    SOCIAL HISTORY:  current smoker;  - lives with her  in [place]; works for a third party managing FMLA    Pulmonary Medications:       Antimicrobials:      Cardiology:      Other:  enoxaparin Injectable 40 milliGRAM(s) SubCutaneous every 24 hours  fentaNYL   Patch  75 MICROgram(s)/Hr 1 Patch Transdermal every 72 hours  multivitamin 1 Tablet(s) Oral daily  naloxegol 25 milliGRAM(s) Oral daily  pantoprazole    Tablet 40 milliGRAM(s) Oral before breakfast  polyethylene glycol 3350 17 Gram(s) Oral two times a day  senna 2 Tablet(s) Oral every 12 hours      Prn:  MEDICATIONS  (PRN):  aluminum hydroxide/magnesium hydroxide/simethicone Suspension 30 milliLiter(s) Oral four times a day PRN Indigestion  bisacodyl Suppository 10 milliGRAM(s) Rectal daily PRN Constipation  HYDROmorphone  Injectable 2.5 milliGRAM(s) IV Push every 2 hours PRN breakthrough Pain  HYDROmorphone  Injectable 2 milliGRAM(s) IV Push every 2 hours PRN Severe Pain (7 - 10)  HYDROmorphone  Injectable 1.5 milliGRAM(s) IV Push every 2 hours PRN Moderate Pain (4 - 6)  magnesium hydroxide Suspension 30 milliLiter(s) Oral daily PRN Constipation  ondansetron Injectable 4 milliGRAM(s) IV Push every 6 hours PRN Nausea and/or Vomiting      Allergies    No Known Allergies    HOME MEDICATIONS: see  H & P    REVIEW OF SYSTEMS:  Constitutional: As per HPI  HEENT: Within normal limits  CV: As per HPI  Resp: As per HPI  GI: bowel inertia/constipation due to narcotics  : urinary retention  Musculoskeletal: s/p right acetabular mass excision with a complex right total hip replacement on 8/3  Skin: Within normal limits  Neurological: Within normal limits  Psychiatric: Within normal limits  Endocrine: Within normal limits  Hematologic/Lymphatic: metastatic lung cancer  Allergic/Immunologic: Within normal limits    [x] All other systems negative    OBJECTIVE:    PHYSICAL EXAM:  ICU Vital Signs Last 24 Hrs  T(C): 36.7 (09 Aug 2022 16:06), Max: 36.8 (08 Aug 2022 20:12)  T(F): 98 (09 Aug 2022 16:06), Max: 98.2 (08 Aug 2022 20:12)  HR: 118 (09 Aug 2022 16:06) (108 - 118)  BP: 110/73 (09 Aug 2022 16:06) (108/74 - 127/77)  BP(mean): --  ABP: --  ABP(mean): --  RR: 18 (09 Aug 2022 16:06) (18 - 18)  SpO2: 96% (09 Aug 2022 16:06) (94% - 96%) on room air    General: Awake. Alert. Cooperative. No distress. Appears stated age 	  HEENT: Atraumatic. Normocephalic. Anicteric. Normal oral mucosa. PERRL. EOMI.  Neck: Supple. Trachea midline. Thyroid without enlargement/tenderness/nodules. No carotid bruit. No JVD.	  Cardiovascular: Tachycardic rate and rhythm. S1 S2 normal. No murmurs, rubs or gallops.  Respiratory: Respirations unlabored. Decreased breath sounds right base with dullness to percussion. No curvature.  Abdomen: Soft. Non-tender. Non-distended. No organomegaly. No masses. Normal bowel sounds. Olmos catheter.  Extremities: Warm to touch. No clubbing or cyanosis. No pedal edema. Abductor pillow in place. Right thigh edema and pain to palpation  Pulses: 2+ peripheral pulses all extremities.	  Skin: Normal skin color. No rashes or lesions. No ecchymoses. No cyanosis. Warm to touch.  Lymph Nodes: Cervical, supraclavicular and axillary nodes normal  Neurological: Motor and sensory examination equal and normal. A and O x 3  Psychiatry: Appropriate mood and affect.      LABS:                          8.2    18.59 )-----------( 592      ( 09 Aug 2022 06:59 )             25.9     CBC    WBC  18.59 <==, 16.33 <==, 22.89 <==, 21.21 <==, 14.85 <==, 11.61 <==, 11.31 <==    Hemoglobin  8.2 <<==, 8.6 <<==, 9.2 <<==, 9.1 <<==, 9.0 <<==, 9.8 <<==, 8.7 <<==    Hematocrit  25.9 <==, 26.6 <==, 28.9 <==, 28.4 <==, 28.0 <==, 29.6 <==, 27.0 <==    Platelets  592 <==, 501 <==, 430 <==, 385 <==, 347 <==, 316 <==, 519 <==      135  |  97  |  9   ----------------------------<  123<H>    08-09  4.1   |  31  |  0.37<L>    LYTES    sodium  135 <==, 133 <==, 133 <==, 134 <==, 135 <==, 137 <==, 134 <==    potassium   4.1 <==, 4.1 <==, 3.7 <==, 3.8 <==, 4.0 <==, 3.7 <==, 4.0 <==    chloride  97 <==, 96 <==, 92 <==, 97 <==, 99 <==, 100 <==, 96 <==    carbon dioxide  31 <==, 29 <==, 26 <==, 26 <==, 22 <==, 37 <==, 25 <==    =============================================================================================  RENAL FUNCTION:    Creatinine:   0.37  <<==, 0.37  <<==, <0.30  <<==, 0.41  <<==, 0.37  <<==, 0.42  <<==, 0.38  <<==    BUN:   9 <==, 10 <==, 11 <==, 14 <==, 18 <==, 21 <==, 22 <==    ============================================================================================    calcium   8.0 <==, 8.0 <==, 8.0 <==, 7.8 <==, 7.8 <==, 7.7 <==, 8.2 <==    ===========================================================================================  MICROBIOLOGY:     COVID-19 PCR . (22 @ 21:22)   COVID-19 PCR: NotDetec:      Urinalysis Basic - ( 08 Aug 2022 09:25 )    Color: Light Yellow / Appearance: Clear / S.007 / pH: x  Gluc: x / Ketone: Negative  / Bili: Negative / Urobili: Negative   Blood: x / Protein: Negative / Nitrite: Negative   Leuk Esterase: Large / RBC: 4 /hpf / WBC 60 /HPF   Sq Epi: x / Non Sq Epi: 0 /hpf / Bacteria: Many    Culture - Urine (22 @ 11:28)   Specimen Source: Catheterized Catheterized   Culture Results:   >100,000 CFU/ml Enterobacter cloacae complex     RADIOLOGY:  [x ] Chest radiographs reviewed and interpreted by me    EXAM:  XR CHEST PORTABLE URGENT 1V                          PROCEDURE DATE:  2022      FINDINGS:  The heart is normal in size.  Small right pleural effusion with associated atelectasis.  No pneumothorax.    IMPRESSION:  Small right pleural effusion with associated atelectasis.    ROD MARTINEZ MD; Resident Radiologist  This document has been electronically signed.  PUMA KAM MD; Attending Interventional Radiologist  This document has been electronically signed. Aug  8 2022 10:17AM  ---------------------------------------------------------------------------------------------------------------  EXAM:  CT ANGIO CHEST PULM ART [facility]                          PROCEDURE DATE:  2022      FINDINGS:    PULMONARY ANGIOGRAM:  No pulmonary embolism.    LUNGS/AIRWAYS/PLEURA: No endobronchial lesion. Several bilateral lung   nodules for example, largest 1.8 cm in the right lower lobe (5-65).   Peripheral and peribronchovascular groundglass in the upper lobes. Mild   emphysema. Right lower lobe passive atelectasis. Small right pleural   effusion in association with thickening of the parietal pleura (for   example, 5-106).    LYMPH NODES/MEDIASTINUM: Right hilar and perihilar lymph nodes up to 1.2   cm. Fluid within the esophagus up to the aortic arch. Mild esophageal   wall thickening.    HEART/VASCULATURE: Normal heart size. Unremarkable pericardium. Normal   caliber aorta.    UPPER ABDOMEN: Stable hepatic cyst and bilateral adrenal thickening.   Cholecystectomy.    BONES/SOFT TISSUES: Multiple bone lesions, for example, the L1 vertebral   body and the left ninth rib. Asymmetric nodular density in the upper   outer left breast.      IMPRESSION:    No pulmonary embolism.    Several bilateral lung nodules, compatible with metastases. Concurrent   mild groundglass in the upper lobes, likely infectious or inflammatory.    Small right pleural effusion associated with right pleural thickening,   raising suspicion for a malignant pleural effusion.    Right hilar/perihilar lymphadenopathy.    Multiple bone metastases.    Asymmetrically dense upper outer left breast, not well characterized with   CT.    ANNIE SHEN M.D., ATTENDING RADIOGIST  This document has been electronically signed. Aug  9 2022  8:32AM    ---------------------------------------------------------------------------------------------------------------  EXAM:  DUPLEX SCAN EXT VEINS LOWER BI                          PROCEDURE DATE:  2022      IMPRESSION:  No evidence of deep venous thrombosis in either lower extremity.    FLACA MCCLELLAND MD; Attending Radiologist  This document has been electronically signed. Aug  9 2022  1:42PM  ---------------------------------------------------------------------------------------------------------------  EXAM:  CT ABDOMEN AND PELVIS                          PROCEDURE DATE:  2022      IMPRESSION:  Large soft tissue mass with gross destruction of the right acetabulum,   and inferior and superior pubic rami highly concerning for malignancy.   The soft tissue mass is inseparable from a small portion the right side   of the bladder and invasion cannot be excluded.. Lytic lesion in L1   vertebral body. Multiple bilateral pleural nodules highly concerning for   metastatic disease. Moderate right pleural effusion with mild posterior   pleural thickening.. Pleural-based metastases cannot be excluded. Right   external iliac lymph node raises concern for metastatic disease.    Gross distention of the ascending colon. There is a large amount of stool   and this may be due to fecal impaction. Patrick syndrome would be   considered if fecal impaction is relieved and dilatation persisted.   Please correlate clinically.    Mild bilateral hydronephrosis which may be due to the very distended   bladder.    ANNIE MENJIVAR MD; Attending Radiologist  This document has been electronically signed. 2022  5:12PM  ---------------------------------------------------------------------------------------------------------------  EXAM:  NM BONE SPECT CT [facility]                        EXAM:  NM BONE IMG WHOLE BODY                          PROCEDURE DATE:  2022      IMPRESSION: Abnormal bone scan.    Mildly heterogeneous tracer activity in the right anterior acetabulum   corresponding to sclerotic density on CT. Destructive lesion in the right   posterior acetabulum and right ischium with difficult to delineate soft   tissue component on CT are not visualized on the bone scan.    Mildly heterogeneous tracer activity in the right proximal femur with no   definite abnormality on CT.    GLENDA RUIZ MD; Attending Nuclear Medicine  This document has been electronically signed. Aug  1 2022  5:40PM  ---------------------------------------------------------------------------------------------------------------

## 2022-08-09 NOTE — DIETITIAN INITIAL EVALUATION ADULT - NSFNSGIASSESSMENTFT_GEN_A_CORE
- Pt denies nausea, vomiting, diarrhea. pt report had constipation but now improved  - Last BM: 2 days ago per pt; currently ordered for bowel regimen (senna, miralax)

## 2022-08-09 NOTE — DIETITIAN INITIAL EVALUATION ADULT - PERTINENT MEDS FT
MEDICATIONS  (STANDING):  calcium carbonate   1250 mG (OsCal) 1 Tablet(s) Oral every 6 hours  enoxaparin Injectable 40 milliGRAM(s) SubCutaneous every 24 hours  fentaNYL   Patch  75 MICROgram(s)/Hr 1 Patch Transdermal every 72 hours  multivitamin 1 Tablet(s) Oral daily  naloxegol 25 milliGRAM(s) Oral daily  pantoprazole    Tablet 40 milliGRAM(s) Oral before breakfast  polyethylene glycol 3350 17 Gram(s) Oral two times a day  senna 2 Tablet(s) Oral every 12 hours    MEDICATIONS  (PRN):  aluminum hydroxide/magnesium hydroxide/simethicone Suspension 30 milliLiter(s) Oral four times a day PRN Indigestion  bisacodyl Suppository 10 milliGRAM(s) Rectal daily PRN Constipation  HYDROmorphone  Injectable 2.5 milliGRAM(s) IV Push every 2 hours PRN breakthrough Pain  HYDROmorphone  Injectable 2 milliGRAM(s) IV Push every 2 hours PRN Severe Pain (7 - 10)  HYDROmorphone  Injectable 1.5 milliGRAM(s) IV Push every 2 hours PRN Moderate Pain (4 - 6)  magnesium hydroxide Suspension 30 milliLiter(s) Oral daily PRN Constipation  ondansetron Injectable 4 milliGRAM(s) IV Push every 6 hours PRN Nausea and/or Vomiting

## 2022-08-09 NOTE — DIETITIAN INITIAL EVALUATION ADULT - CONTINUE CURRENT NUTRITION CARE PLAN
- Continue current diet regimen:  regular diet as appropriate. Monitor tolerance, PO intake, and adjust as needed./yes

## 2022-08-09 NOTE — PROGRESS NOTE ADULT - ASSESSMENT
63 yo F with metastatic lung cancer to bone and brain p/w R pathologic hip fracture s/p wide resection and total hip replacement on 8/3. Palliative following for pain management.

## 2022-08-09 NOTE — DIETITIAN INITIAL EVALUATION ADULT - ORAL INTAKE PTA/DIET HISTORY
Pt endorses fair-poor appetite and PO intake PTA (related to pain). Reports not following specific diet/ diet restrictions PTA and confirmed no known food allergies/ food intolerances

## 2022-08-09 NOTE — DIETITIAN INITIAL EVALUATION ADULT - ADD RECOMMEND
- Menu provided to pt. reviewed food options. handouts with information about diet provided to pt  - Malnutrition sticker placed, RD to follow-up as per protocol   - Will continue to monitor PO intake, weight, labs, skin, GI status, diet.   - Nutrition care plan to remain consistent with pt goals of care  - RD remains available to review diet education and adjust diet recommendations as needed.

## 2022-08-09 NOTE — PROGRESS NOTE ADULT - PROBLEM SELECTOR PLAN 1
Predominant symptom: pain  Likely due to pathologic fracture  Duration of PRN: 2 hours    Recommendations:   Increase fentanyl patch from 50 mcg/hr to 75 mcg/hr  add IVP dilaudid 2.5 mg q 2 for breakthrough pain   continue IVP dilaudid 1.5 mg PRN for moderate pain  continue IVP dilaudid 2.0 mg PRN for severe pain  narcan prn      Will consider po dilaudid transition later today or tomorrow if pt amenable and pain is better controlled.  Pt still requiring multiple PRN of dialudid.  New increased Fentanyl patch (75 mcg) applied 8/8 at 1630.  Will see quantity of prn used through out the day and reassess transition to po prn dilaudid.      Risk mitigation/Bowel regimen: c/w bowel regimen with senna, miralax and naloxogel as ordered and monitor BM's  Adverse events noted: non reported or observed  Recommendations discussed with primary team

## 2022-08-09 NOTE — DIETITIAN INITIAL EVALUATION ADULT - PERTINENT LABORATORY DATA
08-09    135  |  97  |  9   ----------------------------<  123<H>  4.1   |  31  |  0.37<L>    Ca    8.0<L>      09 Aug 2022 07:06    08-09 @ 07:06: Na 135, BUN 9, Cr 0.37<L>, <H>, K+ 4.1, Phos --, Mg --, Alk Phos --, ALT/SGPT --, AST/SGOT --, HbA1c --

## 2022-08-09 NOTE — PROGRESS NOTE ADULT - SUBJECTIVE AND OBJECTIVE BOX
Pt seen/examined. Reports pain over the R lateral chest wall that is worse with movement but not inspiration. Leg pain better controlled on new pain regimen. Otherwise denies SOB, N/V.    Physical Exam:    T(C): 36.8 (08-09-22 @ 04:40), Max: 36.9 (08-08-22 @ 16:06)  HR: 108 (08-09-22 @ 04:40) (90 - 111)  BP: 108/74 (08-09-22 @ 04:40) (108/74 - 127/66)  RR: 18 (08-09-22 @ 04:40) (18 - 18)  SpO2: 95% (08-09-22 @ 04:40) (94% - 98%)  Wt(kg): --  - Gen: NAD  - RLE: Incisions C/D/I, SILT, Fires EHL/FHL/TA/GS, compartments soft and compressible, DP pulse palpable    Labs:                        8.6    16.33 )-----------( 501      ( 08 Aug 2022 09:25 )             26.6       08-08    133<L>  |  96  |  10  ----------------------------<  108<H>  4.1   |  29  |  0.37<L>            Assessment/Plan:    62yFemale s/p R mass excision and complex AMANDEEP now POD#6    - Pain control per Palliative team  - F/U official read of CT chest  - F/U BLLE doppler results  - Abd pillow + anterior precautions  - WBAT, OOB today and working with PT/OT

## 2022-08-09 NOTE — PROGRESS NOTE ADULT - SUBJECTIVE AND OBJECTIVE BOX
SUBJECTIVE/OBJECTIVE:   Patient used dilaudid IV 2mg seven times over the past 24 hours. Pain is rated 9/10 when most severe and is primarily located in the R. groin area. Pain meds usually last ~2 hours  Pt wants pain controlled better before transitioning to PO.     DNR on chart:   Allergies    No Known Allergies    Intolerances    MEDICATIONS  (STANDING):  enoxaparin Injectable 40 milliGRAM(s) SubCutaneous every 24 hours  fentaNYL   Patch  50 MICROgram(s)/Hr 1 Patch Transdermal every 72 hours  multivitamin 1 Tablet(s) Oral daily  naloxegol 25 milliGRAM(s) Oral daily  pantoprazole    Tablet 40 milliGRAM(s) Oral before breakfast  polyethylene glycol 3350 17 Gram(s) Oral two times a day  senna 2 Tablet(s) Oral every 12 hours    MEDICATIONS  (PRN):  aluminum hydroxide/magnesium hydroxide/simethicone Suspension 30 milliLiter(s) Oral four times a day PRN Indigestion  bisacodyl Suppository 10 milliGRAM(s) Rectal daily PRN Constipation  HYDROmorphone  Injectable 2 milliGRAM(s) IV Push every 2 hours PRN Severe Pain (7 - 10)  HYDROmorphone  Injectable 1.5 milliGRAM(s) IV Push every 2 hours PRN Moderate Pain (4 - 6)  magnesium hydroxide Suspension 30 milliLiter(s) Oral daily PRN Constipation  ondansetron Injectable 4 milliGRAM(s) IV Push every 6 hours PRN Nausea and/or Vomiting    ITEMS UNCHECKED ARE NOT PRESENT    PRESENT SYMPTOMS: [ ]Unable to self-report  [ ]PAINADs [ ]RDOS  Source if other than patient:  [ ]Family   [ ]Team     Pain: [X] yes [ ] no  QOL impact -   Location - R. hip pain                    Aggravating factors - movement  Quality -sharp  Radiation - leg   Timing- with movement; also experiencing pain 2 hours after dilaudid is given   Severity (0-10 scale): severe   Minimal acceptable level (0-10 scale): 3-4    PAINAD Score:  [email or web address].[place].[email or web address]/[email or web address] (Ctrl +  left click to view)    Dyspnea: None                          [ ]Mild [ ]Moderate [ ]Severe    RDOS:  0 to 2  minimal or no respiratory distress   3  mild distress  4 to 6 moderate distress  >7 severe distress  https://homecareinformation.net/handouts/hen/Respiratory_Distress_Observation_Scale.pdf (Ctrl +  left click to view)     Anxiety:                             [x ]Mild [ ]Moderate [ ]Severe  Fatigue:                             [ ]Mild [ ]Moderate [ ]Severe  Nausea:                             [ ]Mild [ ]Moderate [ ]Severe  Loss of appetite:              [ ]Mild [ ]Moderate [ ]Severe  Constipation:                    [X]Mild [ ]Moderate [ ]Severe    PCSSQ[Palliative Care Spiritual Screening Question]   Severity (0-10): Not assessed due to acute physical pain  Score of 4 or > indicate consideration of Chaplaincy referral.  Chaplaincy Referral: [ ] yes [ ] refused [ ] following  		  Other Symptoms:  [X ]All other review of systems negative     Palliative Performance Status Version 2:   40      %         http://npcrc.org/files/news/palliative_performance_scale_ppsv2.pdf  PHYSICAL EXAM:   Vital Signs Last 24 Hrs  T(C): 36.7 (08 Aug 2022 08:21), Max: 37.2 (08 Aug 2022 04:05)  T(F): 98.1 (08 Aug 2022 08:21), Max: 99 (08 Aug 2022 04:05)  HR: 90 (08 Aug 2022 08:21) (90 - 118)  BP: 109/68 (08 Aug 2022 08:21) (105/67 - 124/76)  BP(mean): --  RR: 18 (08 Aug 2022 08:21) (18 - 18)  SpO2: 96% (08 Aug 2022 08:21) (95% - 99%)    Parameters below as of 08 Aug 2022 08:21  Patient On (Oxygen Delivery Method): room air     I&O's Summary    07 Aug 2022 07:01  -  08 Aug 2022 07:00  --------------------------------------------------------  IN: 1120 mL / OUT: 1500 mL / NET: -380 mL    08 Aug 2022 07:01  -  08 Aug 2022 11:55  --------------------------------------------------------  IN: 0 mL / OUT: 250 mL / NET: -250 mL           GENERAL:  [x]Alert  [x]Oriented x 3  [ ]Lethargic  [ ]Cachexia  [ ]Unarousable  [x]Verbal  [ ]Non-Verbal  Behavioral:   [ ]Anxiety  [ ]Delirium [ ]Agitation [x ]Other- calm  HEENT:  [x]Normal   [ ]Dry mouth   [ ]ET Tube/Trach  [ ]Oral lesions  PULMONARY:   [x]Clear [ ]Tachypnea  [ ]Audible excessive secretions   [ ]Rhonchi        [ ]Right [ ]Left [ ]Bilateral  [ ]Crackles        [ ]Right [ ]Left [ ]Bilateral  [ ]Wheezing     [ ]Right [ ]Left [ ]Bilateral  [ ]Diminished BS [ ] Right [ ]Left [ ]Bilateral  CARDIOVASCULAR:    [x]Regular [ ]Irregular [ ]Tachy  [ ]Blaine [ ]Murmur [ ]Other  GASTROINTESTINAL:  [x]Soft  [ ]Distended   [x]+BS  [x]Non tender [ ]Tender  [ ]PEG [ ]OGT/ NGT   Last BM:    GENITOURINARY:  [x]Normal [ ]Incontinent   [ ]Oliguria/Anuria   [ ]Gimenez  MUSCULOSKELETAL:   [ ]Normal   [x]Weakness  [ ]Bed/Wheelchair bound [ ]Edema  NEUROLOGIC:   [x]No focal deficits  [ ] Cognitive impairment  [ ] Dysphagia [ ]Dysarthria [ ] Paresis [ ]Other     LABS:                        8.6    16.33 )-----------( 501      ( 08 Aug 2022 09:25 )             26.6   08-08    133<L>  |  96  |  10  ----------------------------<  108<H>  4.1   |  29  |  0.37<L>    Ca    8.0<L>      08 Aug 2022 09:25        Urinalysis Basic - ( 08 Aug 2022 09:25 )    Color: Light Yellow / Appearance: Clear / S.007 / pH: x  Gluc: x / Ketone: Negative  / Bili: Negative / Urobili: Negative   Blood: x / Protein: Negative / Nitrite: Negative   Leuk Esterase: Large / RBC: 4 /hpf / WBC 60 /HPF   Sq Epi: x / Non Sq Epi: 0 /hpf / Bacteria: Many      RADIOLOGY & ADDITIONAL STUDIES:  < from: Xray Chest 1 View- PORTABLE-Urgent (Xray Chest 1 View- PORTABLE-Urgent .) (22 @ 06:43) >  ACC: [id number] EXAM:  XR CHEST PORTABLE URGENT 1V                          PROCEDURE DATE:  2022          INTERPRETATION:  EXAMINATION: XR CHEST URGENT    CLINICAL INDICATION: Chest pain with inspiration.    TECHNIQUE: Single frontal, portableview of the chest was obtained.    COMPARISON: Chest x-ray 2022. CT abdomen and pelvis 2022    FINDINGS:  The heart is normal in size.  Small right pleural effusion with associated atelectasis.  No pneumothorax.    IMPRESSION:  Small right pleural effusion with associated atelectasis.    --- End of Report ---           ROD MARTINEZ MD; Resident Radiologist  This document has been electronically signed.  PUMA KAM MD; Attending Interventional Radiologist  This document has been electronically signed. Aug  8 2022 10:17AM    < end of copied text >    PROTEIN CALORIE MALNUTRITION: [ ] mild [ ] moderate [ ] severe  [ ] underweight [ ] morbid obesity    https://www.andeal.org/vault/2440/web/files/ONC/Table_Clinical%20Characteristics%20to%20Document%20Malnutrition-White%20JV%20et%20al%2020.pdf    Height (cm): 157.5 (22 @ 12:16)  Weight (kg): 55.8 (22 @ 12:16)  BMI (kg/m2): 22.5 (22 @ 12:16)    [ ] PPSV2 < or = 30% [ ] significant weight loss [ ] poor nutritional intake [ ] anasarca   Artificial Nutrition [ ]     Other REFERRALS:    [ ] Hospice  [ ]Child Life  [ ]Social Work  [ ]Case management [ ]Holistic Therapy [ ] Physical Therapy [ ] Dietary   Goals of Care Document:

## 2022-08-09 NOTE — DIETITIAN INITIAL EVALUATION ADULT - PERSON TAUGHT/METHOD
- Encouraged adequate PO intake for meeting nutritional needs, improving nutritional status and for preventing wt loss  - Discussed the importance of including adequate calories and proteins throughout the day; reviewed protein calorie dense food sources/ meal and snacks ideas.  - RD remains available to review diet education and adjust diet recommendations as needed./verbal instruction/written material/teach back - (Patient repeats in own words)/patient instructed

## 2022-08-09 NOTE — DIETITIAN INITIAL EVALUATION ADULT - NSFNSPHYEXAMSKINFT_GEN_A_CORE
Pt states UBW ~115 pounds pounds; dosing wt 123 pounds - may be related to fluid accumulation; monitor wt trends   112% IBW ( pounds)  Skin: no noted pressure injuries as per flowsheets   Performed nutrition focused physical exam with pt's consent and noted mild-moderate signs of muscle/fat loss

## 2022-08-09 NOTE — DIETITIAN INITIAL EVALUATION ADULT - ORAL NUTRITION SUPPLEMENTS
- Recommend Ensure Plus High Protein (nutritionally similar to Ensure Enlive - currently unavailable from ; 350 kcal and 20 g protein) x3/day

## 2022-08-09 NOTE — CONSULT NOTE ADULT - ASSESSMENT
62y Female, heavy smoker, with recently diagnosed metastatic NSCLC presents as direct transfer from [facility] for right pathological hip fracture.
61 yo woman with recently diagnosed lung CA with meds to brain and bone presents with a right pathologic hip fracture scheduled for an Open reduction and internal fixation 
63 yo F with metastatic lung cancer to bone and brain p/w R pathalogic hip fracture.  Scheduled for or today at 12.  Palliative care/ [place] team called for pain
ASSESSMENT:    62 year old gentlewoman, current smoker, recently diagnosed with metastatic lung cancer on a biopsy of a right pelvic lesion at [facility] with evidence of disease in the brain and bone. PET/CT -> 2cm FDG avid superior segment of the right lower lobe nodule associated with hypermetabolic right hilar adenopathy - trace right pleural effusion with metastatic disease to the right pleura - 7cm destructive osseous lesion involving the right acetabulum - hypermetabolic lytic lesion in the L1 vertebral body. Brain MRI -> 3 subcentimeter metastases the largest being 8mm in the left frontal lobe - no vasogenic edema. The patient was started on radiation therapy which was stopped after only two sessions due to the patient's inability to bear weight related due to intractable pain. She was transferred to [place] for further treatment and evaluation. Imaging revealed a large soft tissue mass with gross destruction of the right acetabulum and the inferior and superior pubic rami highly concerning for malignancy - the soft tissue mass is inseparable from a small portion the right side of the bladder and invasion cannot be excluded - lytic lesion in L1 vertebral body. The patient underwent right acetabular mass excision with a complex right total hip replacement on 8/3. 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 She had a bowel movement yesterday. She is receiving nutritional support for protein calorie malnutrition. The pain in her right leg is somewhat improved and she walked in the hallway with physical therapy yesterday. Urinary retention has required placement of a olmos catheter. She has persistent tachycardia without shortness of breath or hypoxemia on room air. She has no cough, sputum production, chest congestion or wheeze. No fevers, chills or sweats. No fatigue, malaise or weakness. She has no anterior chest pain/pressure or palpitations. She has right sided chest pain exacerbated by deep inspiration.       the patient has advanced non-small cell lung cancer - the primary lesion is in the right lower lobe - there is evidence of intrathoracic spread of disease to the right hilar nodes and right pleura with a malignant right pleural effusion - the peripheral and peribronchovascular groundglass opacities in the upper lobes likely reflect smoking related bronchiolitis - there are 3 subcentimeter lesions in the brain - her presenting symptoms were due to a 7cm destructive osseous lesion involving the right acetabulum with a hypermetabolic lytic lesion in the L1 vertebral body.    the patient underwent right acetabular mass excision with a complex right total hip replacement on 8/3 - post operative course has been notable for:  1) severe pain that is now improving  2) colonic inertia due to high dose narcotics   3) urinary retention due to immobility  4) tachycardia related to pain, anxiety and hypovolemia    PLAN/RECOMMENDATIONS:    stable oxygenation on room air  bedside spirometry  observe off pulmonary medications and antibiotics  use of the incentive spirometer at bedside encouraged  conservative management of the malignant pleural effusion and bronchiolitis unless the patient develops respiratory symptoms  would check to make sure that the patient does not need steroids and prophylactic anticonvulsants for her CNS lesions  follow-up with radiation oncology and medical oncology is [facility]  trial of void when the patient is more mobile  analgesics decreasing narcotics as able - on fentanyl patch  GI/DVT prophylaxis - protonix/SQ lovenox  bowel regimen - naloxegol/miralax/senna    Thank you for the courtesy of this referral. Plan of care discussed with the patient at bedside.    Arben Jackson MD, [facility]  [phone]  Pulmonary Medicine

## 2022-08-09 NOTE — PROGRESS NOTE ADULT - SUBJECTIVE AND OBJECTIVE BOX
seen earlier today  reports pain controlled at rest     REVIEW OF SYSTEMS  Constitutional - No fever,  No fatigue  HEENT - No vertigo, No neck pain  Neurological - No headaches, No memory loss, No loss of strength, No numbness, No tremors  Skin - No rashes, No lesions   Musculoskeletal - No joint pain, No joint swelling, No muscle pain  Psychiatric - No depression, No anxiety    FUNCTIONAL PROGRESS   PT  bed mobility mod assist  transfers contact guard  gait contact guard/min assist x 10 feet     VITALS  T(C): 36.7 (22 @ 08:29), Max: 36.9 (22 @ 16:06)  HR: 110 (22 @ 08:29) (108 - 111)  BP: 127/77 (22 @ 08:29) (108/74 - 127/77)  RR: 18 (22 @ 08:29) (18 - 18)  SpO2: 96% (22 @ 08:29) (94% - 98%)  Wt(kg): --    MEDICATIONS   aluminum hydroxide/magnesium hydroxide/simethicone Suspension 30 milliLiter(s) four times a day PRN  bisacodyl Suppository 10 milliGRAM(s) daily PRN  calcium carbonate   1250 mG (OsCal) 1 Tablet(s) every 6 hours  enoxaparin Injectable 40 milliGRAM(s) every 24 hours  fentaNYL   Patch  75 MICROgram(s)/Hr 1 Patch every 72 hours  HYDROmorphone  Injectable 2 milliGRAM(s) every 2 hours PRN  HYDROmorphone  Injectable 1.5 milliGRAM(s) every 2 hours PRN  HYDROmorphone  Injectable 2.5 milliGRAM(s) every 2 hours PRN  magnesium hydroxide Suspension 30 milliLiter(s) daily PRN  multivitamin 1 Tablet(s) daily  naloxegol 25 milliGRAM(s) daily  ondansetron Injectable 4 milliGRAM(s) every 6 hours PRN  pantoprazole    Tablet 40 milliGRAM(s) before breakfast  polyethylene glycol 3350 17 Gram(s) two times a day  senna 2 Tablet(s) every 12 hours      RECENT LABS - Reviewed                        8.2    18.59 )-----------( 592      ( 09 Aug 2022 06:59 )             25.9     08    135  |  97  |  9   ----------------------------<  123<H>  4.1   |  31  |  0.37<L>    Ca    8.0<L>      09 Aug 2022 07:06        Urinalysis Basic - ( 08 Aug 2022 09:25 )    Color: Light Yellow / Appearance: Clear / S.007 / pH: x  Gluc: x / Ketone: Negative  / Bili: Negative / Urobili: Negative   Blood: x / Protein: Negative / Nitrite: Negative   Leuk Esterase: Large / RBC: 4 /hpf / WBC 60 /HPF   Sq Epi: x / Non Sq Epi: 0 /hpf / Bacteria: Many    --------------------------------------------------------------------------------  PHYSICAL EXAM  Constitutional - NAD, Comfortable, in bed   Chest - Breathing comfortably  Cardiovascular - S1S2   Abdomen - Soft   Extremities - No C/C/E, No calf tenderness   Neurologic Exam -                    Cognitive - Awake, Alert, AAO to self, place, date, year, situation     Communication - Fluent, No dysarthria        Motor -                     LEFT    UE - ShAB 5/5, EF 5/5, EE 5/5, WE 5/5,  5/5                    RIGHT UE - ShAB 5/5, EF 5/5, EE 5/5, WE 5/5,  5/5                    LEFT    LE - HF 5/5, KE 5/5, DF 5/5, PF 5/5                    RIGHT LE - KE 4/5, DF 5/5, PF 5/5        Sensory - Intact to LT     Psychiatric - Mood stable, Affect WNL  ----------------------------------------------------------------------------------------  ASSESSMENT/PLAN  62yFemale recently diagnosed with metastatic lung cancer, functional deficits after right AMANDEEP   WBAT RLE, anterior hip precautions   bowel regimen  Pain - Tylenol, seen by palliative care, fentanyl patch increased, dilaudid IV (needs to be on oral pain meds x 24 hours for acute rehab), plan to switch to oral pain meds  DVT PPX - SCDs, lovenox   Rehab - Will continue to follow for ongoing rehab needs and recommendations.   out of bed to chair daily    Recommend ACUTE inpatient rehabilitation for the functional deficits consisting of 3 hours of therapy/day & 24 hour RN/daily PMR physician for comorbid medical management. Patient will be able to tolerate 3 hours a day.

## 2022-08-09 NOTE — CONSULT NOTE ADULT - CONSULT REASON
lung cancer; malignant pleural effusion; atelectasis lung cancer; malignant pleural effusion; atelectasis; COPD/emphysema

## 2022-08-09 NOTE — PROGRESS NOTE ADULT - SUBJECTIVE AND OBJECTIVE BOX
62y Female presents as direct transfer from [facility], c/o R hip pain that begain in May, then this past weekend was unable to bear weight and went to nearby hospital, where she was found to have R acetabular lesion, lung lesions and brain lesions per pt. R hip lesion was biopsied at prior hospital and consistent with metastatic lung cancer. Patient denies numbness. Denies trauma. Patient denies headstrike or LOC. Patient denies radiation of pain. Patient denies numbness/tingling/burning in the RLE. No other bone/joint complaints. Patient is a community ambulator at baseline without assistive devices. Patient seen now resting with continue complaint of pain in the right leg. Patient s/p CT scan which shows stool in the colon.  Pain seems better controlled. Patient found to have persistent tachycardia and LE edema.     MEDICATIONS  (STANDING):  calcium carbonate   1250 mG (OsCal) 1 Tablet(s) Oral every 6 hours  enoxaparin Injectable 40 milliGRAM(s) SubCutaneous every 24 hours  fentaNYL   Patch  75 MICROgram(s)/Hr 1 Patch Transdermal every 72 hours  multivitamin 1 Tablet(s) Oral daily  naloxegol 25 milliGRAM(s) Oral daily  pantoprazole    Tablet 40 milliGRAM(s) Oral before breakfast  polyethylene glycol 3350 17 Gram(s) Oral two times a day  senna 2 Tablet(s) Oral every 12 hours    MEDICATIONS  (PRN):  aluminum hydroxide/magnesium hydroxide/simethicone Suspension 30 milliLiter(s) Oral four times a day PRN Indigestion  bisacodyl Suppository 10 milliGRAM(s) Rectal daily PRN Constipation  HYDROmorphone  Injectable 2 milliGRAM(s) IV Push every 2 hours PRN Severe Pain (7 - 10)  HYDROmorphone  Injectable 1.5 milliGRAM(s) IV Push every 2 hours PRN Moderate Pain (4 - 6)  HYDROmorphone  Injectable 2.5 milliGRAM(s) IV Push every 2 hours PRN breakthrough Pain  magnesium hydroxide Suspension 30 milliLiter(s) Oral daily PRN Constipation  ondansetron Injectable 4 milliGRAM(s) IV Push every 6 hours PRN Nausea and/or Vomiting          VITALS:   T(C): 36.7 (22 @ 08:29), Max: 36.9 (22 @ 16:06)  HR: 110 (22 @ 08:29) (108 - 111)  BP: 127/77 (22 @ 08:29) (108/74 - 127/77)  RR: 18 (22 @ 08:29) (18 - 18)  SpO2: 96% (22 @ 08:29) (94% - 98%)  Wt(kg): --    PHYSICAL EXAM:  GENERAL: NAD, well-groomed, well-developed  HEAD:  Atraumatic, Normocephalic  EYES: EOMI, PERRLA, conjunctiva and sclera clear  ENMT: No tonsillar erythema, exudates, or enlargement; Moist mucous membranes, Good dentition, No lesions  NECK: Supple, No JVD, Normal thyroid  NERVOUS SYSTEM:  Alert & Oriented X3, Good concentration;   CHEST/LUNG: Clear to percussion bilaterally; No rales, rhonchi, wheezing, or rubs  HEART: Regular rate and rhythm; No murmurs, rubs, or gallops  ABDOMEN: Soft, Nontender, Nondistended; Bowel sounds present  EXTREMITIES:  2+ Peripheral Pulses, No clubbing, cyanosis, or edema  LYMPH: No lymphadenopathy noted  SKIN: No rashes or lesions    LABS:        CBC Full  -  ( 09 Aug 2022 06:59 )  WBC Count : 18.59 K/uL  RBC Count : 2.75 M/uL  Hemoglobin : 8.2 g/dL  Hematocrit : 25.9 %  Platelet Count - Automated : 592 K/uL  Mean Cell Volume : 94.2 fl  Mean Cell Hemoglobin : 29.8 pg  Mean Cell Hemoglobin Concentration : 31.7 gm/dL  Auto Neutrophil # : x  Auto Lymphocyte # : x  Auto Monocyte # : x  Auto Eosinophil # : x  Auto Basophil # : x  Auto Neutrophil % : x  Auto Lymphocyte % : x  Auto Monocyte % : x  Auto Eosinophil % : x  Auto Basophil % : x        135  |  97  |  9   ----------------------------<  123<H>  4.1   |  31  |  0.37<L>    Ca    8.0<L>      09 Aug 2022 07:06          Urinalysis Basic - ( 08 Aug 2022 09:25 )    Color: Light Yellow / Appearance: Clear / S.007 / pH: x  Gluc: x / Ketone: Negative  / Bili: Negative / Urobili: Negative   Blood: x / Protein: Negative / Nitrite: Negative   Leuk Esterase: Large / RBC: 4 /hpf / WBC 60 /HPF   Sq Epi: x / Non Sq Epi: 0 /hpf / Bacteria: Many      CAPILLARY BLOOD GLUCOSE          RADIOLOGY & ADDITIONAL TESTS:

## 2022-08-09 NOTE — PROGRESS NOTE ADULT - PROBLEM SELECTOR PLAN 3
Patient has been following up with a oncologist in [place]  States she recently had a bone biopsy but doesn't have the results  will need to eventually start chemo +/- RT.

## 2022-08-09 NOTE — PROGRESS NOTE ADULT - SUBJECTIVE AND OBJECTIVE BOX
MADELINE [id number]  62yFemale  T(C): 36.8 (08-09-22 @ 04:40), Max: 36.9 (08-08-22 @ 16:06)  HR: 108 (08-09-22 @ 04:40) (90 - 111)  BP: 108/74 (08-09-22 @ 04:40) (108/74 - 127/66)  RR: 18 (08-09-22 @ 04:40) (18 - 18)  SpO2: 95% (08-09-22 @ 04:40) (94% - 98%)  Wt(kg): --  08-08 @ 07:01  -  08-09 @ 07:00  --------------------------------------------------------  IN: 720 mL / OUT: 1950 mL / NET: -1230 mL      normal cephalic atraumatic  s1s2   clear to ascultation bilaterally  soft, non tender, non distended no guarding or rebound  no clubbing cyanosis or edema

## 2022-08-10 NOTE — PROGRESS NOTE ADULT - ATTENDING COMMENTS
Pt with advanced NSCLC s/p R hip replacement for pathologic fracture. Currently POD 1 on pain control. Currently with drain from the surgical site bloody fluid, lying in bed awaiting pain medication, normal respiratory effort, no BLE edema or rash. She will follow up with [facility] oncologist for palliative chemotherapy and radiation oncology as outpt.
63 yo F with metastatic lung cancer to bone and brain p/w R pathologic hip fracture s/p wide resection and total hip replacement on 8/3. Palliative following for pain management.    Pt required IV dilaudid 2mg x 5 in 24 hour period (MEDD 200mg). Pt reports pain is triggered by movement. Extensive discussion held regarding options for pain management.  Pt understands need for IV medications is a barrier to d/c and she is eager to go to rehab to get stronger so she can resume DMT. Pt wishes to continue with IV medications today and try transition to PO meds tomorrow. Plan is to increase fentanyl patch to 75mcg/q72h. Continue PRN IV dialudid 1.5 mg q2h PRN moderate pain and  PRN IV dialudid 2mg q2h PRN severe pain. Add IV dilaudid 2.5mg q2h PRN breakthrough pain. Continue bowel regimen. Narcan PRN. Recommendations discussed with ortho team.    For acute issues or uncontrolled symptoms please page palliative team.    Blanka Ge MD  Geriatrics and Palliative Medicine Attending  [facility] pager: [phone]     The Geriatrics and Palliative Medicine consult service has 24/7 coverage for medical recommendations, including symptom management needs.
Palliative care continues to follow for pain management. 13mg IV dilaudid used over last 24 hours, patient with 75mcg fentanyl patch in place. 13mg TB=702ax OME, which is still significant. we discussed pain regimen with patient and increased fentanyl patch from 75mcg to 100mcg, with room for uptitration if needed. If needs remain significant, and pending disposition, methadone may be an alternative option.  We discussed transition off IV medication to PO after PT session today and patient amenable. will start dilaudid 8-10mg q4h prn, IV only for severe, uncontrolled/acute pain if refractory to oral dosing. Reviewed with patient and spouse at bedside. bowel regimen; last BM 8/9.  Plan is for rehab disposition, patient has been in contact with case management regarding her choices. we will continue to follow. please page if acute issues arise.

## 2022-08-10 NOTE — PROGRESS NOTE ADULT - PROBLEM SELECTOR PLAN 1
Predominant symptom: pain  Likely due to pathologic fracture  Duration of PRN: 2 hours    Recommendations:   Increase fentanyl patch from 75 mcg/hr to 100 mcg/hr  add dilaudid PO 8 mg q 4 for moderate pain after PT today  add dilaudid PO 10 mg q6 for severe pain after PT today  discontinue all IVP dilaudid after PT today  please order narcan prn    Risk mitigation/Bowel regimen: c/w bowel regimen with senna, miralax and naloxogel as ordered and monitor BM's  Adverse events noted: non reported or observed Predominant symptom: pain  Likely due to pathologic fracture  Duration of PRN: 2 hours    Recommendations:   Increase fentanyl patch from 75 mcg/hr to 100 mcg/hr  add dilaudid PO 8 mg q 4 for moderate pain after PT today  add dilaudid PO 10 mg q 6 for severe pain after PT today  discontinue all IVP dilaudid after PT today  please order narcan prn    Risk mitigation/Bowel regimen: c/w bowel regimen with senna, miralax and naloxogel as ordered and monitor BM's  Adverse events noted: non reported or observed Predominant symptom: pain  Likely due to pathologic fracture  Duration of PRN: 2 hours    Recommendations:   Increase fentanyl patch from 75 mcg/hr to 100 mcg/hr  add dilaudid PO 8 mg q 4 for moderate pain after PT today  add dilaudid PO 10 mg q 6 for severe pain after PT today  discontinue all IVP dilaudid after PT today  please order narcan prn    Risk mitigation/Bowel regimen: c/w bowel regimen with senna, miralax and naloxogel as ordered and monitor BM's  Adverse events noted: non reported or observed  Recommendations discussed with ortho PA Predominant symptom: pain  Likely due to pathologic fracture  Duration of PRN: 2 hours    Recommendations:   Increase fentanyl patch from 75 mcg/hr to 100 mcg/hr  add dilaudid PO 8 mg q 4 for moderate pain after PT today  add dilaudid PO 10 mg q 6 for severe pain after PT today  discontinue all IVP dilaudid after PT today  please order narcan prn    Risk mitigation/Bowel regimen: last BM 8/9 per patient report. c/w bowel regimen with senna, miralax and naloxogel as ordered and monitor BM's  Adverse events noted: non reported or observed  Recommendations discussed with ortho PA

## 2022-08-10 NOTE — PROGRESS NOTE ADULT - SUBJECTIVE AND OBJECTIVE BOX
62y Female presents as direct transfer from [facility], c/o R hip pain that begain in May, then this past weekend was unable to bear weight and went to nearby hospital, where she was found to have R acetabular lesion, lung lesions and brain lesions per pt. R hip lesion was biopsied at prior hospital and consistent with metastatic lung cancer. Patient denies numbness. Denies trauma. Patient denies headstrike or LOC. Patient denies radiation of pain. Patient denies numbness/tingling/burning in the RLE. No other bone/joint complaints. Patient is a community ambulator at baseline without assistive devices. Patient seen now resting with continue complaint of pain in the right leg. Patient s/p CT scan which shows stool in the colon.  Pain seems better controlled. Patient seen with complaint of pain after switching to oral pain meds.      MEDICATIONS  (STANDING):  ciprofloxacin     Tablet 500 milliGRAM(s) Oral every 12 hours  enoxaparin Injectable 40 milliGRAM(s) SubCutaneous every 24 hours  fentaNYL   Patch 100 MICROgram(s)/Hr 1 Patch Transdermal every 72 hours  multivitamin 1 Tablet(s) Oral daily  naloxegol 25 milliGRAM(s) Oral daily  pantoprazole    Tablet 40 milliGRAM(s) Oral before breakfast  polyethylene glycol 3350 17 Gram(s) Oral two times a day  senna 2 Tablet(s) Oral every 12 hours    MEDICATIONS  (PRN):  aluminum hydroxide/magnesium hydroxide/simethicone Suspension 30 milliLiter(s) Oral four times a day PRN Indigestion  bisacodyl Suppository 10 milliGRAM(s) Rectal daily PRN Constipation  HYDROmorphone   Tablet 8 milliGRAM(s) Oral every 4 hours PRN Moderate Pain (4 - 6)  HYDROmorphone   Tablet 10 milliGRAM(s) Oral every 4 hours PRN Severe Pain (7 - 10)  HYDROmorphone  Injectable 2.5 milliGRAM(s) IV Push every 2 hours PRN breakthrough Pain  magnesium hydroxide Suspension 30 milliLiter(s) Oral daily PRN Constipation  naloxone Injectable 0.3 milliGRAM(s) IV Push every 3 minutes PRN obtundation, RR <10  ondansetron Injectable 4 milliGRAM(s) IV Push every 6 hours PRN Nausea and/or Vomiting          VITALS:   T(C): 36.7 (08-10-22 @ 12:52), Max: 37.2 (08-10-22 @ 04:30)  HR: 105 (08-10-22 @ 12:52) (105 - 110)  BP: 120/76 (08-10-22 @ 12:52) (111/68 - 120/76)  RR: 18 (08-10-22 @ 12:52) (18 - 18)  SpO2: 95% (08-10-22 @ 12:52) (94% - 96%)  Wt(kg): --      PHYSICAL EXAM:  GENERAL: NAD, well-groomed, well-developed  HEAD:  Atraumatic, Normocephalic  EYES: EOMI, PERRLA, conjunctiva and sclera clear  ENMT: No tonsillar erythema, exudates, or enlargement; Moist mucous membranes, Good dentition, No lesions  NECK: Supple, No JVD, Normal thyroid  NERVOUS SYSTEM:  Alert & Oriented X3, Good concentration;   CHEST/LUNG: Clear to percussion bilaterally; No rales, rhonchi, wheezing, or rubs  HEART: Regular rate and rhythm; No murmurs, rubs, or gallops  ABDOMEN: Soft, Nontender, Nondistended; Bowel sounds present  EXTREMITIES:  2+ Peripheral Pulses, No clubbing, cyanosis, or edema  LYMPH: No lymphadenopathy noted  SKIN: No rashes or lesions    LABS:        CBC Full  -  ( 10 Aug 2022 06:50 )  WBC Count : 22.22 K/uL  RBC Count : 2.79 M/uL  Hemoglobin : 8.3 g/dL  Hematocrit : 25.9 %  Platelet Count - Automated : 596 K/uL  Mean Cell Volume : 92.8 fl  Mean Cell Hemoglobin : 29.7 pg  Mean Cell Hemoglobin Concentration : 32.0 gm/dL  Auto Neutrophil # : x  Auto Lymphocyte # : x  Auto Monocyte # : x  Auto Eosinophil # : x  Auto Basophil # : x  Auto Neutrophil % : x  Auto Lymphocyte % : x  Auto Monocyte % : x  Auto Eosinophil % : x  Auto Basophil % : x    08-10    133<L>  |  94<L>  |  11  ----------------------------<  115<H>  4.1   |  31  |  0.32<L>    Ca    8.2<L>      10 Aug 2022 07:09            CAPILLARY BLOOD GLUCOSE      POCT Blood Glucose.: 180 mg/dL (10 Aug 2022 12:50)      RADIOLOGY & ADDITIONAL TESTS:

## 2022-08-10 NOTE — PROGRESS NOTE ADULT - SUBJECTIVE AND OBJECTIVE BOX
SUBJECTIVE AND OBJECTIVE: NAEO. Pt seen and examined at bedside. Used IV dilaudid 2 x4 and IV dilaudid 2.5 x2 over the past 24 hours. Pt notes that she used the IV dilaudid PRN ordered before working with PT as she was anticipating the pain. However notes that the other times pt was experiencing pain when she asked for the meds. Pain is rated 9/10 when most severe and is primarily located in the R. groin area. Pain meds usually last ~2 hours  Pt wants pain controlled better before transitioning to PO. Had BM yest. Also notes that she feels anxious at times and asks for meds when experiencing tingling sensation.       DNR on chart: N/A   Allergies    No Known Allergies    Intolerances    MEDICATIONS  (STANDING):  ciprofloxacin     Tablet 500 milliGRAM(s) Oral every 12 hours  enoxaparin Injectable 40 milliGRAM(s) SubCutaneous every 24 hours  fentaNYL   Patch  75 MICROgram(s)/Hr 1 Patch Transdermal every 72 hours  multivitamin 1 Tablet(s) Oral daily  naloxegol 25 milliGRAM(s) Oral daily  pantoprazole    Tablet 40 milliGRAM(s) Oral before breakfast  polyethylene glycol 3350 17 Gram(s) Oral two times a day  senna 2 Tablet(s) Oral every 12 hours    MEDICATIONS  (PRN):  aluminum hydroxide/magnesium hydroxide/simethicone Suspension 30 milliLiter(s) Oral four times a day PRN Indigestion  bisacodyl Suppository 10 milliGRAM(s) Rectal daily PRN Constipation  HYDROmorphone  Injectable 1.5 milliGRAM(s) IV Push every 2 hours PRN Moderate Pain (4 - 6)  HYDROmorphone  Injectable 2 milliGRAM(s) IV Push every 2 hours PRN Severe Pain (7 - 10)  HYDROmorphone  Injectable 2.5 milliGRAM(s) IV Push every 2 hours PRN breakthrough Pain  magnesium hydroxide Suspension 30 milliLiter(s) Oral daily PRN Constipation  ondansetron Injectable 4 milliGRAM(s) IV Push every 6 hours PRN Nausea and/or Vomiting      ITEMS UNCHECKED ARE NOT PRESENT    PRESENT SYMPTOMS: [ ]Unable to self-report - see [ ] CPOT [ ] PAINADS [ ] RDOS  Source if other than patient:  [ ]Family   [ ]Team     Pain: [X] yes [ ] no  QOL impact - severe  Location - R. hip pain                    Aggravating factors - movement  Quality -sharp  Radiation - leg   Timing- with movement  Severity (0-10 scale): severe   Minimal acceptable level (0-10 scale): 3-4    CPOT:    https://www.[facility].org/getattachment/[email or web address]/Critical-Care-Pain-Observation-Tool-(CPOT)    PAIN AD Score:	  http://geriatrictoolkit.[facility]/cog/painad.pdf (Ctrl + left click to view)    Dyspnea:                           [ ]Mild [ ]Moderate [ ]Severe    RDOS: 0  0 to 2  minimal or no respiratory distress   3  mild distress  4 to 6 moderate distress  >7 severe distress  https://[email or web address]/handouts/hen/Respiratory_Distress_Observation_Scale.pdf (Ctrl +  left click to view)     Anxiety:                             [X ]Mild [ ]Moderate [ ]Severe  Fatigue:                             [ ]Mild [ ]Moderate [ ]Severe  Nausea:                             [ ]Mild [ ]Moderate [ ]Severe  Loss of appetite:              [ ]Mild [ ]Moderate [ ]Severe  Constipation:                    [ ]Mild [ ]Moderate [ ]Severe    PCSSQ[Palliative Care Spiritual Screening Question]   Severity (0-10):  Score of 4 or > indicate consideration of Chaplaincy referral.  Chaplaincy Referral: [ ] yes [ ] refused [ ] following    Other Symptoms:  [ X]All other review of systems negative     Palliative Performance Status Version 2:    40     %      http://[email or web address].org/files/news/palliative_performance_scale_[email or web address].pdf  PHYSICAL EXAM:  Vital Signs Last 24 Hrs  T(C): 36.7 (10 Aug 2022 12:52), Max: 37.2 (10 Aug 2022 04:30)  T(F): 98 (10 Aug 2022 12:52), Max: 98.9 (10 Aug 2022 04:30)  HR: 105 (10 Aug 2022 12:52) (105 - 118)  BP: 120/76 (10 Aug 2022 12:52) (110/73 - 122/75)  BP(mean): --  RR: 18 (10 Aug 2022 12:52) (18 - 18)  SpO2: 95% (10 Aug 2022 12:52) (94% - 96%)    Parameters below as of 10 Aug 2022 12:52  Patient On (Oxygen Delivery Method): room air     I&O's Summary    09 Aug 2022 07:01  -  10 Aug 2022 07:00  --------------------------------------------------------  IN: 480 mL / OUT: 1050 mL / NET: -570 mL    10 Aug 2022 07:01  -  10 Aug 2022 13:47  --------------------------------------------------------  IN: 240 mL / OUT: 300 mL / NET: -60 mL       GENERAL:   [x]Alert  [x]Oriented x 3  [ ]Lethargic  [ ]Cachexia  [ ]Unarousable  [x]Verbal  [ ]Non-Verbal  Behavioral:   [ ]Anxiety  [ ]Delirium [ ]Agitation [x ]Other- calm  HEENT:  [x]Normal   [ ]Dry mouth   [ ]ET Tube/Trach  [ ]Oral lesions  PULMONARY:   [x]Clear [ ]Tachypnea  [ ]Audible excessive secretions   [ ]Rhonchi        [ ]Right [ ]Left [ ]Bilateral  [ ]Crackles        [ ]Right [ ]Left [ ]Bilateral  [ ]Wheezing     [ ]Right [ ]Left [ ]Bilateral  [ ]Diminished BS [ ] Right [ ]Left [ ]Bilateral  CARDIOVASCULAR:    [x]Regular [ ]Irregular [ ]Tachy  [ ]Blaine [ ]Murmur [ ]Other  GASTROINTESTINAL:  [x]Soft  [ ]Distended   [x]+BS  [x]Non tender [ ]Tender  [ ]PEG [ ]OGT/ NGT   Last BM: 8/7   GENITOURINARY:  [x]Normal [ ]Incontinent   [ ]Oliguria/Anuria   [ ]Gimenez  MUSCULOSKELETAL:   [ ]Normal   [x]Weakness  [ ]Bed/Wheelchair bound [ ]Edema  NEUROLOGIC:   [x]No focal deficits  [ ] Cognitive impairment  [ ] Dysphagia [ ]Dysarthria [ ] Paresis [ ]Other     LABS:                        8.3    22.22 )-----------( 596      ( 10 Aug 2022 06:50 )             25.9   08-10    133<L>  |  94<L>  |  11  ----------------------------<  115<H>  4.1   |  31  |  0.32<L>    Ca    8.2<L>      10 Aug 2022 07:09          RADIOLOGY & ADDITIONAL STUDIES:    Protein Calorie Malnutrition Present: [ ]mild [ ]moderate [ ]severe [ ]underweight [ ]morbid obesity  https://www.andeal.org/vault/2440/web/files/ONC/Table_Clinical%20Characteristics%20to%20Document%20Malnutrition-White%20JV%20et%20al%202012.pdf    Height (cm): 157.5 (08-03-22 @ 12:16)  Weight (kg): 55.8 (08-03-22 @ 12:16)  BMI (kg/m2): 22.5 (08-03-22 @ 12:16)    [ ]PPSV2 < or = 30%  [ ]significant weight loss [ ]poor nutritional intake [ ]anasarca[ ]Artificial Nutrition    Other REFERRALS:  [ ]Hospice  [ ]Child Life  [ ]Social Work  [ ]Case management [ ]Holistic Therapy     Goals of Care Document: SUBJECTIVE AND OBJECTIVE: JERMAINEO. Pt seen and examined at bedside. Used IV dilaudid 2 x4 and IV dilaudid 2.5 x2 over the past 24 hours. Pt notes that she used the IV dilaudid PRN ordered before working with PT as she was anticipating the pain. However notes that the other times pt was experiencing pain when she asked for the meds. Pain is rated 9/10 when most severe and is primarily located in the R. groin area. Pain meds usually last ~2 hours  Pt wants pain controlled better before transitioning to PO. Had BM yest. Also notes that she feels anxious at times and asks for meds when experiencing tingling sensation.       Allergies    No Known Allergies    Intolerances    MEDICATIONS  (STANDING):  ciprofloxacin     Tablet 500 milliGRAM(s) Oral every 12 hours  enoxaparin Injectable 40 milliGRAM(s) SubCutaneous every 24 hours  fentaNYL   Patch  75 MICROgram(s)/Hr 1 Patch Transdermal every 72 hours  multivitamin 1 Tablet(s) Oral daily  naloxegol 25 milliGRAM(s) Oral daily  pantoprazole    Tablet 40 milliGRAM(s) Oral before breakfast  polyethylene glycol 3350 17 Gram(s) Oral two times a day  senna 2 Tablet(s) Oral every 12 hours    MEDICATIONS  (PRN):  aluminum hydroxide/magnesium hydroxide/simethicone Suspension 30 milliLiter(s) Oral four times a day PRN Indigestion  bisacodyl Suppository 10 milliGRAM(s) Rectal daily PRN Constipation  HYDROmorphone  Injectable 1.5 milliGRAM(s) IV Push every 2 hours PRN Moderate Pain (4 - 6)  HYDROmorphone  Injectable 2 milliGRAM(s) IV Push every 2 hours PRN Severe Pain (7 - 10)  HYDROmorphone  Injectable 2.5 milliGRAM(s) IV Push every 2 hours PRN breakthrough Pain  magnesium hydroxide Suspension 30 milliLiter(s) Oral daily PRN Constipation  ondansetron Injectable 4 milliGRAM(s) IV Push every 6 hours PRN Nausea and/or Vomiting      ITEMS UNCHECKED ARE NOT PRESENT    PRESENT SYMPTOMS: [ ]Unable to self-report - see [ ] CPOT [ ] PAINADS [ ] RDOS  Source if other than patient:  [ ]Family   [ ]Team     Pain: [X] yes [ ] no  QOL impact - severe  Location - R. hip pain                    Aggravating factors - movement  Quality -sharp  Radiation - leg   Timing- with movement  Severity (0-10 scale): severe   Minimal acceptable level (0-10 scale): 3-4    CPOT:    https://www.[facility].org/getattachment/[email or web address]/Critical-Care-Pain-Observation-Tool-(CPOT)    PAIN AD Score:	  http://geriatrictoolkit.[facility]/cog/painad.pdf (Ctrl + left click to view)    Dyspnea:                           [ ]Mild [ ]Moderate [ ]Severe    RDOS: 0  0 to 2  minimal or no respiratory distress   3  mild distress  4 to 6 moderate distress  >7 severe distress  https://[email or web address]/handouts/hen/Respiratory_Distress_Observation_Scale.pdf (Ctrl +  left click to view)     Anxiety:                             [X ]Mild [ ]Moderate [ ]Severe  Fatigue:                             [ ]Mild [ ]Moderate [ ]Severe  Nausea:                             [ ]Mild [ ]Moderate [ ]Severe  Loss of appetite:              [ ]Mild [ ]Moderate [ ]Severe  Constipation:                    [ ]Mild [ ]Moderate [ ]Severe    PCSSQ[Palliative Care Spiritual Screening Question]   Severity (0-10):  Score of 4 or > indicate consideration of Chaplaincy referral.  Chaplaincy Referral: [ ] yes [ ] refused [ ] following    Other Symptoms:  [ X]All other review of systems negative     Palliative Performance Status Version 2:    40     %      http://[facility].org/files/news/palliative_performance_scale_[email or web address].pdf  PHYSICAL EXAM:  Vital Signs Last 24 Hrs  T(C): 36.7 (10 Aug 2022 12:52), Max: 37.2 (10 Aug 2022 04:30)  T(F): 98 (10 Aug 2022 12:52), Max: 98.9 (10 Aug 2022 04:30)  HR: 105 (10 Aug 2022 12:52) (105 - 118)  BP: 120/76 (10 Aug 2022 12:52) (110/73 - 122/75)  BP(mean): --  RR: 18 (10 Aug 2022 12:52) (18 - 18)  SpO2: 95% (10 Aug 2022 12:52) (94% - 96%)    Parameters below as of 10 Aug 2022 12:52  Patient On (Oxygen Delivery Method): room air     I&O's Summary    09 Aug 2022 07:01  -  10 Aug 2022 07:00  --------------------------------------------------------  IN: 480 mL / OUT: 1050 mL / NET: -570 mL    10 Aug 2022 07:01  -  10 Aug 2022 13:47  --------------------------------------------------------  IN: 240 mL / OUT: 300 mL / NET: -60 mL       GENERAL:   [x]Alert  [x]Oriented x 3  [ ]Lethargic  [ ]Cachexia  [ ]Unarousable  [x]Verbal  [ ]Non-Verbal  Behavioral:   [ ]Anxiety  [ ]Delirium [ ]Agitation [x ]Other- calm  HEENT:  [x]Normal   [ ]Dry mouth   [ ]ET Tube/Trach  [ ]Oral lesions  PULMONARY:   [x]Clear [ ]Tachypnea  [ ]Audible excessive secretions   [ ]Rhonchi        [ ]Right [ ]Left [ ]Bilateral  [ ]Crackles        [ ]Right [ ]Left [ ]Bilateral  [ ]Wheezing     [ ]Right [ ]Left [ ]Bilateral  [ ]Diminished BS [ ] Right [ ]Left [ ]Bilateral  CARDIOVASCULAR:    [x]Regular [ ]Irregular [ ]Tachy  [ ]Blaine [ ]Murmur [ ]Other  GASTROINTESTINAL:  [x]Soft  [ ]Distended   [x]+BS  [x]Non tender [ ]Tender  [ ]PEG [ ]OGT/ NGT   Last BM: 8/7   GENITOURINARY:  [x]Normal [ ]Incontinent   [ ]Oliguria/Anuria   [ ]Gimenez  MUSCULOSKELETAL:   [ ]Normal   [x]Weakness  [ ]Bed/Wheelchair bound [ ]Edema  NEUROLOGIC:   [x]No focal deficits  [ ] Cognitive impairment  [ ] Dysphagia [ ]Dysarthria [ ] Paresis [ ]Other     LABS:                        8.3    22.22 )-----------( 596      ( 10 Aug 2022 06:50 )             25.9   08-10    133<L>  |  94<L>  |  11  ----------------------------<  115<H>  4.1   |  31  |  0.32<L>    Ca    8.2<L>      10 Aug 2022 07:09          RADIOLOGY & ADDITIONAL STUDIES:    Protein Calorie Malnutrition Present: [ ]mild [ ]moderate [ ]severe [ ]underweight [ ]morbid obesity  https://www.andeal.org/vault/2440/web/files/ONC/Table_Clinical%20Characteristics%20to%20Document%20Malnutrition-White%20JV%20et%20al%202012.pdf    Height (cm): 157.5 (08-03-22 @ 12:16)  Weight (kg): 55.8 (08-03-22 @ 12:16)  BMI (kg/m2): 22.5 (08-03-22 @ 12:16)    [ ]PPSV2 < or = 30%  [ ]significant weight loss [ ]poor nutritional intake [ ]anasarca[ ]Artificial Nutrition    Other REFERRALS:  [ ]Hospice  [ ]Child Life  [ ]Social Work  [ ]Case management [ ]Holistic Therapy     Goals of Care Document: SUBJECTIVE AND OBJECTIVE: NAEO. Pt seen and examined at bedside. Used IV dilaudid 2 x4 and IV dilaudid 2.5 x2 over the past 24 hours. Pt notes that she used the IV dilaudid PRN ordered before working with PT as she was anticipating the pain. However notes that the other times pt was experiencing pain when she asked for the meds. Pain is rated 9/10 when most severe and is primarily located in the R. groin area. Pain meds usually last ~2 hours  Pt wants pain controlled better before transitioning to PO. Had BM yest. Also notes that she feels anxious at times and asks for meds when experiencing tingling sensation. Pt amenable to starting PO meds today after working with PT.       Allergies    No Known Allergies    Intolerances    MEDICATIONS  (STANDING):  ciprofloxacin     Tablet 500 milliGRAM(s) Oral every 12 hours  enoxaparin Injectable 40 milliGRAM(s) SubCutaneous every 24 hours  fentaNYL   Patch  75 MICROgram(s)/Hr 1 Patch Transdermal every 72 hours  multivitamin 1 Tablet(s) Oral daily  naloxegol 25 milliGRAM(s) Oral daily  pantoprazole    Tablet 40 milliGRAM(s) Oral before breakfast  polyethylene glycol 3350 17 Gram(s) Oral two times a day  senna 2 Tablet(s) Oral every 12 hours    MEDICATIONS  (PRN):  aluminum hydroxide/magnesium hydroxide/simethicone Suspension 30 milliLiter(s) Oral four times a day PRN Indigestion  bisacodyl Suppository 10 milliGRAM(s) Rectal daily PRN Constipation  HYDROmorphone  Injectable 1.5 milliGRAM(s) IV Push every 2 hours PRN Moderate Pain (4 - 6)  HYDROmorphone  Injectable 2 milliGRAM(s) IV Push every 2 hours PRN Severe Pain (7 - 10)  HYDROmorphone  Injectable 2.5 milliGRAM(s) IV Push every 2 hours PRN breakthrough Pain  magnesium hydroxide Suspension 30 milliLiter(s) Oral daily PRN Constipation  ondansetron Injectable 4 milliGRAM(s) IV Push every 6 hours PRN Nausea and/or Vomiting      ITEMS UNCHECKED ARE NOT PRESENT    PRESENT SYMPTOMS: [ ]Unable to self-report - see [ ] CPOT [ ] PAINADS [ ] RDOS  Source if other than patient:  [ ]Family   [ ]Team     Pain: [X] yes [ ] no  QOL impact - severe  Location - R. hip pain                    Aggravating factors - movement  Quality -sharp  Radiation - leg   Timing- with movement  Severity (0-10 scale): severe   Minimal acceptable level (0-10 scale): 3-4    CPOT:    https://www.[facility].org/getattachment/[email or web address]/Critical-Care-Pain-Observation-Tool-(CPOT)    PAIN AD Score:	  http://geriatrictoolkit.[facility]/cog/painad.pdf (Ctrl + left click to view)    Dyspnea:                           [ ]Mild [ ]Moderate [ ]Severe    RDOS: 0  0 to 2  minimal or no respiratory distress   3  mild distress  4 to 6 moderate distress  >7 severe distress  https://[email or web address]/handouts/hen/Respiratory_Distress_Observation_Scale.pdf (Ctrl +  left click to view)     Anxiety:                             [X ]Mild [ ]Moderate [ ]Severe  Fatigue:                             [ ]Mild [ ]Moderate [ ]Severe  Nausea:                             [ ]Mild [ ]Moderate [ ]Severe  Loss of appetite:              [ ]Mild [ ]Moderate [ ]Severe  Constipation:                    [ ]Mild [ ]Moderate [ ]Severe    PCSSQ[Palliative Care Spiritual Screening Question]   Severity (0-10):  Score of 4 or > indicate consideration of Chaplaincy referral.  Chaplaincy Referral: [ ] yes [ ] refused [ ] following    Other Symptoms:  [ X]All other review of systems negative     Palliative Performance Status Version 2:    40     %      http://[facility].org/files/news/palliative_performance_scale_[email or web address].pdf  PHYSICAL EXAM:  Vital Signs Last 24 Hrs  T(C): 36.7 (10 Aug 2022 12:52), Max: 37.2 (10 Aug 2022 04:30)  T(F): 98 (10 Aug 2022 12:52), Max: 98.9 (10 Aug 2022 04:30)  HR: 105 (10 Aug 2022 12:52) (105 - 118)  BP: 120/76 (10 Aug 2022 12:52) (110/73 - 122/75)  BP(mean): --  RR: 18 (10 Aug 2022 12:52) (18 - 18)  SpO2: 95% (10 Aug 2022 12:52) (94% - 96%)    Parameters below as of 10 Aug 2022 12:52  Patient On (Oxygen Delivery Method): room air     I&O's Summary    09 Aug 2022 07:01  -  10 Aug 2022 07:00  --------------------------------------------------------  IN: 480 mL / OUT: 1050 mL / NET: -570 mL    10 Aug 2022 07:01  -  10 Aug 2022 13:47  --------------------------------------------------------  IN: 240 mL / OUT: 300 mL / NET: -60 mL       GENERAL:   [x]Alert  [x]Oriented x 3  [ ]Lethargic  [ ]Cachexia  [ ]Unarousable  [x]Verbal  [ ]Non-Verbal  Behavioral:   [ ]Anxiety  [ ]Delirium [ ]Agitation [x ]Other- calm  HEENT:  [x]Normal   [ ]Dry mouth   [ ]ET Tube/Trach  [ ]Oral lesions  PULMONARY:   [x]Clear [ ]Tachypnea  [ ]Audible excessive secretions   [ ]Rhonchi        [ ]Right [ ]Left [ ]Bilateral  [ ]Crackles        [ ]Right [ ]Left [ ]Bilateral  [ ]Wheezing     [ ]Right [ ]Left [ ]Bilateral  [ ]Diminished BS [ ] Right [ ]Left [ ]Bilateral  CARDIOVASCULAR:    [x]Regular [ ]Irregular [ ]Tachy  [ ]Blaine [ ]Murmur [ ]Other  GASTROINTESTINAL:  [x]Soft  [ ]Distended   [x]+BS  [x]Non tender [ ]Tender  [ ]PEG [ ]OGT/ NGT   Last BM: 8/7   GENITOURINARY:  [x]Normal [ ]Incontinent   [ ]Oliguria/Anuria   [ ]Gimenez  MUSCULOSKELETAL:   [ ]Normal   [x]Weakness  [ ]Bed/Wheelchair bound [ ]Edema  NEUROLOGIC:   [x]No focal deficits  [ ] Cognitive impairment  [ ] Dysphagia [ ]Dysarthria [ ] Paresis [ ]Other     LABS:                        8.3    22.22 )-----------( 596      ( 10 Aug 2022 06:50 )             25.9   08-10    133<L>  |  94<L>  |  11  ----------------------------<  115<H>  4.1   |  31  |  0.32<L>    Ca    8.2<L>      10 Aug 2022 07:09          RADIOLOGY & ADDITIONAL STUDIES:    Protein Calorie Malnutrition Present: [ ]mild [ ]moderate [ ]severe [ ]underweight [ ]morbid obesity  https://www.andeal.org/vault/2440/web/files/ONC/Table_Clinical%20Characteristics%20to%20Document%20Malnutrition-White%20JV%20et%20al%202012.pdf    Height (cm): 157.5 (08-03-22 @ 12:16)  Weight (kg): 55.8 (08-03-22 @ 12:16)  BMI (kg/m2): 22.5 (08-03-22 @ 12:16)    [ ]PPSV2 < or = 30%  [ ]significant weight loss [ ]poor nutritional intake [ ]anasarca[ ]Artificial Nutrition    Other REFERRALS:  [ ]Hospice  [ ]Child Life  [ ]Social Work  [ ]Case management [ ]Holistic Therapy     Goals of Care Document: SUBJECTIVE AND OBJECTIVE: NAEO. Pt seen and examined at bedside. Used IV dilaudid 2 x4 and IV dilaudid 2.5 x2 over the past 24 hours. Pt notes that she used the IV dilaudid PRN ordered before working with PT as she was anticipating the pain. However notes that the other times pt was experiencing pain when she asked for the meds. Pain is rated 9/10 when most severe and is primarily located in the R. groin area. Pain meds usually last ~2 hours  Pt wants pain controlled better before transitioning to PO. Had BM yest. Also notes that she feels anxious at times and asks for meds when experiencing tingling sensation. Pt amenable to starting PO meds today after working with PT.       Allergies    No Known Allergies    Intolerances    MEDICATIONS  (STANDING):  ciprofloxacin     Tablet 500 milliGRAM(s) Oral every 12 hours  enoxaparin Injectable 40 milliGRAM(s) SubCutaneous every 24 hours  fentaNYL   Patch  75 MICROgram(s)/Hr 1 Patch Transdermal every 72 hours  multivitamin 1 Tablet(s) Oral daily  naloxegol 25 milliGRAM(s) Oral daily  pantoprazole    Tablet 40 milliGRAM(s) Oral before breakfast  polyethylene glycol 3350 17 Gram(s) Oral two times a day  senna 2 Tablet(s) Oral every 12 hours    MEDICATIONS  (PRN):  aluminum hydroxide/magnesium hydroxide/simethicone Suspension 30 milliLiter(s) Oral four times a day PRN Indigestion  bisacodyl Suppository 10 milliGRAM(s) Rectal daily PRN Constipation  HYDROmorphone  Injectable 1.5 milliGRAM(s) IV Push every 2 hours PRN Moderate Pain (4 - 6)  HYDROmorphone  Injectable 2 milliGRAM(s) IV Push every 2 hours PRN Severe Pain (7 - 10)  HYDROmorphone  Injectable 2.5 milliGRAM(s) IV Push every 2 hours PRN breakthrough Pain  magnesium hydroxide Suspension 30 milliLiter(s) Oral daily PRN Constipation  ondansetron Injectable 4 milliGRAM(s) IV Push every 6 hours PRN Nausea and/or Vomiting      ITEMS UNCHECKED ARE NOT PRESENT    PRESENT SYMPTOMS: [ ]Unable to self-report - see [ ] CPOT [ ] PAINADS [ ] RDOS  Source if other than patient:  [ ]Family   [ ]Team     Pain: [X] yes [ ] no  QOL impact - severe  Location - R. hip pain                    Aggravating factors - movement  Quality -sharp  Radiation - leg   Timing- with movement  Severity (0-10 scale): severe, 10  Minimal acceptable level (0-10 scale): 3-4    CPOT:    https://www.[facility].org/getattachment/[email or web address]/Critical-Care-Pain-Observation-Tool-(CPOT)    PAIN AD Score:	  http://geriatrictoolkit.[facility]/cog/painad.pdf (Ctrl + left click to view)    Dyspnea:                           [ ]Mild [ ]Moderate [ ]Severe    RDOS: 0  0 to 2  minimal or no respiratory distress   3  mild distress  4 to 6 moderate distress  >7 severe distress  [email or web address]/handouts/hen/Respiratory_Distress_Observation_Scale.pdf (Ctrl +  left click to view)     Anxiety:                             [X ]Mild [ ]Moderate [ ]Severe  Fatigue:                             [ ]Mild [ ]Moderate [ ]Severe  Nausea:                             [ ]Mild [ ]Moderate [ ]Severe  Loss of appetite:              [ ]Mild [ ]Moderate [ ]Severe  Constipation:                    [ ]Mild [ ]Moderate [ ]Severe    PCSSQ[Palliative Care Spiritual Screening Question]   Severity (0-10):  Score of 4 or > indicate consideration of Chaplaincy referral.  Chaplaincy Referral: [ ] yes [ ] refused [ ] following    Other Symptoms:  [ X]All other review of systems negative     Palliative Performance Status Version 2:    40     %      http://npcrc.org/files/news/palliative_performance_scale_[email or web address].pdf  PHYSICAL EXAM:  Vital Signs Last 24 Hrs  T(C): 36.7 (10 Aug 2022 12:52), Max: 37.2 (10 Aug 2022 04:30)  T(F): 98 (10 Aug 2022 12:52), Max: 98.9 (10 Aug 2022 04:30)  HR: 105 (10 Aug 2022 12:52) (105 - 118)  BP: 120/76 (10 Aug 2022 12:52) (110/73 - 122/75)  BP(mean): --  RR: 18 (10 Aug 2022 12:52) (18 - 18)  SpO2: 95% (10 Aug 2022 12:52) (94% - 96%)    Parameters below as of 10 Aug 2022 12:52  Patient On (Oxygen Delivery Method): room air     I&O's Summary    09 Aug 2022 07:01  -  10 Aug 2022 07:00  --------------------------------------------------------  IN: 480 mL / OUT: 1050 mL / NET: -570 mL    10 Aug 2022 07:01  -  10 Aug 2022 13:47  --------------------------------------------------------  IN: 240 mL / OUT: 300 mL / NET: -60 mL       GENERAL:   [x]Alert  [x]Oriented x 3  [ ]Lethargic  [ ]Cachexia  [ ]Unarousable  [x]Verbal  [ ]Non-Verbal  Behavioral:   [ ]Anxiety  [ ]Delirium [ ]Agitation [x ]Other- calm  HEENT:  [x]Normal   [ ]Dry mouth   [ ]ET Tube/Trach  [ ]Oral lesions  PULMONARY:   [x]Clear [ ]Tachypnea  [ ]Audible excessive secretions   [ ]Rhonchi        [ ]Right [ ]Left [ ]Bilateral  [ ]Crackles        [ ]Right [ ]Left [ ]Bilateral  [ ]Wheezing     [ ]Right [ ]Left [ ]Bilateral  [ ]Diminished BS [ ] Right [ ]Left [ ]Bilateral  CARDIOVASCULAR:    [x]Regular [ ]Irregular [ ]Tachy  [ ]Blaine [ ]Murmur [ ]Other  GASTROINTESTINAL:  [x]Soft  [ ]Distended   [x]+BS  [x]Non tender [ ]Tender  [ ]PEG [ ]OGT/ NGT   Last BM: 8/7   GENITOURINARY:  [x]Normal [ ]Incontinent   [ ]Oliguria/Anuria   [ ]Gimenez  MUSCULOSKELETAL:   [ ]Normal   [x]Weakness  [ ]Bed/Wheelchair bound [ ]Edema  NEUROLOGIC:   [x]No focal deficits  [ ] Cognitive impairment  [ ] Dysphagia [ ]Dysarthria [ ] Paresis [ ]Other     LABS:                        8.3    22.22 )-----------( 596      ( 10 Aug 2022 06:50 )             25.9   08-10    133<L>  |  94<L>  |  11  ----------------------------<  115<H>  4.1   |  31  |  0.32<L>    Ca    8.2<L>      10 Aug 2022 07:09          RADIOLOGY & ADDITIONAL STUDIES: no new imaging    Protein Calorie Malnutrition Present: [ ]mild [ ]moderate [ ]severe [ ]underweight [ ]morbid obesity  https://www.andeal.org/vault/2440/web/files/ONC/Table_Clinical%20Characteristics%20to%20Document%20Malnutrition-White%20JV%20et%20al%202012.pdf    Height (cm): 157.5 (08-03-22 @ 12:16)  Weight (kg): 55.8 (08-03-22 @ 12:16)  BMI (kg/m2): 22.5 (08-03-22 @ 12:16)    [ ]PPSV2 < or = 30%  [ ]significant weight loss [ ]poor nutritional intake [ ]anasarca[ ]Artificial Nutrition    Other REFERRALS:  [ ]Hospice  [ ]Child Life  [ ]Social Work  [ x]Case management [ ]Holistic Therapy     Goals of Care Document:

## 2022-08-10 NOTE — PROGRESS NOTE ADULT - PROBLEM SELECTOR PLAN 3
Patient has been following up with a oncologist in [place]  States she recently had a bone biopsy but doesn't have the results  will need to eventually start chemo +/- RT.

## 2022-08-10 NOTE — PROGRESS NOTE ADULT - ASSESSMENT
Assessment/Plan:    62yFemale s/p R mass excision and complex AMANDEEP now POD#7    - Pain control per Palliative team  - Appreciate pulm recs  - Abd pillow + anterior precautions  - WBAT, OOB today and working with PT/OT  - Dispo planning for acute rehab

## 2022-08-10 NOTE — PROGRESS NOTE ADULT - PROBLEM SELECTOR PLAN 6
Will continue to follow for ongoing symptom management. Will continue to follow for ongoing symptom management  Recommendations discussed with ortho PA Will continue to follow for ongoing symptom management  please page if acute issues arise [phone]

## 2022-08-10 NOTE — PROGRESS NOTE ADULT - SUBJECTIVE AND OBJECTIVE BOX
ORTHOPAEDICS DAILY PROGRESS NOTE:       SUBJECTIVE/ROS: POD 7. Seen and examined. Pain well controlled. Has been working with Pt. Ambulated around halls yesterday. Says positional R sided chest pain has improved. Denies SOB/N/V. On room air         MEDICATIONS  (STANDING):  enoxaparin Injectable 40 milliGRAM(s) SubCutaneous every 24 hours  fentaNYL   Patch  75 MICROgram(s)/Hr 1 Patch Transdermal every 72 hours  multivitamin 1 Tablet(s) Oral daily  naloxegol 25 milliGRAM(s) Oral daily  pantoprazole    Tablet 40 milliGRAM(s) Oral before breakfast  polyethylene glycol 3350 17 Gram(s) Oral two times a day  senna 2 Tablet(s) Oral every 12 hours    MEDICATIONS  (PRN):  aluminum hydroxide/magnesium hydroxide/simethicone Suspension 30 milliLiter(s) Oral four times a day PRN Indigestion  bisacodyl Suppository 10 milliGRAM(s) Rectal daily PRN Constipation  HYDROmorphone  Injectable 2 milliGRAM(s) IV Push every 2 hours PRN Severe Pain (7 - 10)  HYDROmorphone  Injectable 1.5 milliGRAM(s) IV Push every 2 hours PRN Moderate Pain (4 - 6)  HYDROmorphone  Injectable 2.5 milliGRAM(s) IV Push every 2 hours PRN breakthrough Pain  magnesium hydroxide Suspension 30 milliLiter(s) Oral daily PRN Constipation  ondansetron Injectable 4 milliGRAM(s) IV Push every 6 hours PRN Nausea and/or Vomiting      OBJECTIVE:    Vital Signs Last 24 Hrs  T(C): 37.2 (10 Aug 2022 04:30), Max: 37.2 (10 Aug 2022 04:30)  T(F): 98.9 (10 Aug 2022 04:30), Max: 98.9 (10 Aug 2022 04:30)  HR: 106 (10 Aug 2022 04:30) (106 - 118)  BP: 114/72 (10 Aug 2022 04:30) (110/73 - 127/77)  BP(mean): --  RR: 18 (10 Aug 2022 04:30) (18 - 18)  SpO2: 94% (10 Aug 2022 04:30) (94% - 96%)    Parameters below as of 10 Aug 2022 04:30  Patient On (Oxygen Delivery Method): room air        I&O's Detail    08 Aug 2022 07:01  -  09 Aug 2022 07:00  --------------------------------------------------------  IN:    Oral Fluid: 1120 mL  Total IN: 1120 mL    OUT:    Indwelling Catheter - Urethral (mL): 1950 mL  Total OUT: 1950 mL    Total NET: -830 mL      09 Aug 2022 07:01  -  10 Aug 2022 05:56  --------------------------------------------------------  IN:    Oral Fluid: 480 mL  Total IN: 480 mL    OUT:    Indwelling Catheter - Urethral (mL): 650 mL  Total OUT: 650 mL    Total NET: -170 mL          Daily     Daily     LABS:                        8.2    18.59 )-----------( 592      ( 09 Aug 2022 06:59 )             25.9     08-09    135  |  97  |  9   ----------------------------<  123<H>  4.1   |  31  |  0.37<L>    Ca    8.0<L>      09 Aug 2022 07:06        Urinalysis Basic - ( 08 Aug 2022 09:25 )    Color: Light Yellow / Appearance: Clear / S.007 / pH: x  Gluc: x / Ketone: Negative  / Bili: Negative / Urobili: Negative   Blood: x / Protein: Negative / Nitrite: Negative   Leuk Esterase: Large / RBC: 4 /hpf / WBC 60 /HPF   Sq Epi: x / Non Sq Epi: 0 /hpf / Bacteria: Many                PHYSICAL EXAM:  nad  nonlabored resp  rle  abduction pillow  dressing c/d/i, meplex  +gastroc/ta/ehl/fhl  silt s/s/sp/dp/t  +dp

## 2022-08-10 NOTE — PROGRESS NOTE ADULT - SUBJECTIVE AND OBJECTIVE BOX
Patient is a 62y Female     Patient is a 62y old  Female who presents with a chief complaint of pathologic hip fracture (09 Aug 2022 19:35)      HPI:  62y Female presents as direct transfer from [facility], c/o R hip pain that begain in May, then this past weekend was unable to bear weight and went to nearby hospital, where she was found to have R acetabular lesion, lung lesions and brain lesions per pt. R hip lesion was biopsied at prior hospital and consistent with metastatic lung cancer. Patient denies numbness. Denies trauma. Patient denies headstrike or LOC. Patient denies radiation of pain. Patient denies numbness/tingling/burning in the RLE. No other bone/joint complaints. Patient is a community ambulator at baseline without assistive devices.    PAST MEDICAL & SURGICAL HISTORY:    MEDICATIONS  (STANDING):  acetaminophen     Tablet .. 975 milliGRAM(s) Oral every 8 hours  enoxaparin Injectable 40 milliGRAM(s) SubCutaneous every 24 hours  senna 2 Tablet(s) Oral at bedtime  sodium chloride 0.9%. 1000 milliLiter(s) (75 mL/Hr) IV Continuous <Continuous>    MEDICATIONS  (PRN):  HYDROmorphone  Injectable 0.5 milliGRAM(s) IV Push every 6 hours PRN Severe Pain (7 - 10)  magnesium hydroxide Suspension 30 milliLiter(s) Oral daily PRN Constipation  melatonin 3 milliGRAM(s) Oral at bedtime PRN Insomnia  oxyCODONE    IR 2.5 milliGRAM(s) Oral every 4 hours PRN Moderate Pain (4 - 6)  oxyCODONE    IR 5 milliGRAM(s) Oral every 4 hours PRN Severe Pain (7 - 10)    Allergies    No Known Allergies    Intolerances                    T(C): 36.7 (07-28-22 @ 23:04), Max: 36.7 (07-28-22 @ 23:04)  HR: 114 (07-28-22 @ 23:04) (114 - 114)  BP: 126/87 (07-28-22 @ 23:04) (126/87 - 126/87)  RR: 18 (07-28-22 @ 23:04) (18 - 18)  SpO2: 93% (07-28-22 @ 23:04) (93% - 93%)  Wt(kg): --    PE   RLE:  Skin intact; No ecchymosis/soft tissue swelling  Compartments soft; + TTP about hip. No TTP to knee/leg/ankle/foot   ROM lmited 2/2 pain   Unable to SLR;   Motor intact GS/TA/FHL/EHL  SILT L2-S1  +dp    LLE/BUE:   No bony TTP; Good ROM w/o pain; Exam Unremarkable    Imaging:  XR and CT showing R acetabular lesion    62y Female with R acetabular lesion    - PWB RLE with rolling walker  - Pain control  - FU labs   - FU medicine and heme onc clearance  - Lovenox for dvt ppx  - Plan for OR Wednesday 8/3   (28 Jul 2022 23:54)      PAST MEDICAL & SURGICAL HISTORY:      MEDICATIONS  (STANDING):  ciprofloxacin     Tablet 500 milliGRAM(s) Oral every 12 hours  enoxaparin Injectable 40 milliGRAM(s) SubCutaneous every 24 hours  fentaNYL   Patch  75 MICROgram(s)/Hr 1 Patch Transdermal every 72 hours  multivitamin 1 Tablet(s) Oral daily  naloxegol 25 milliGRAM(s) Oral daily  pantoprazole    Tablet 40 milliGRAM(s) Oral before breakfast  polyethylene glycol 3350 17 Gram(s) Oral two times a day  senna 2 Tablet(s) Oral every 12 hours      Allergies    No Known Allergies    Intolerances        SOCIAL HISTORY:  Denies ETOh,Smoking,     FAMILY HISTORY:      REVIEW OF SYSTEMS:    CONSTITUTIONAL: No weakness, fevers or chills  EYES/ENT: No visual changes;  No vertigo or throat pain   NECK: No pain or stiffness  RESPIRATORY: No cough, wheezing, hemoptysis; No shortness of breath  CARDIOVASCULAR: No chest pain or palpitations  GASTROINTESTINAL: No abdominal or epigastric pain. No nausea, vomiting, or hematemesis; No diarrhea or constipation. No melena or hematochezia.  GENITOURINARY: No dysuria, frequency or hematuria  NEUROLOGICAL: No numbness or weakness  SKIN: No itching, burning, rashes, or lesions   All other review of systems is negative unless indicated above.    VITAL:  T(C): , Max: 37.2 (08-10-22 @ 04:30)  T(F): , Max: 98.9 (08-10-22 @ 04:30)  HR: 105 (08-10-22 @ 08:48)  BP: 113/71 (08-10-22 @ 08:48)  BP(mean): --  RR: 18 (08-10-22 @ 08:48)  SpO2: 94% (08-10-22 @ 08:48)  Wt(kg): --    I and O's:    08-08 @ 07:01  -  08-09 @ 07:00  --------------------------------------------------------  IN: 1120 mL / OUT: 1950 mL / NET: -830 mL    08-09 @ 07:01  -  08-10 @ 07:00  --------------------------------------------------------  IN: 480 mL / OUT: 1050 mL / NET: -570 mL          PHYSICAL EXAM:    Constitutional: NAD  HEENT: PERRLA,   Neck: No JVD  Respiratory: CTA B/L  Cardiovascular: S1 and S2  Gastrointestinal: BS+, soft, NT/ND  Extremities: No peripheral edema  Neurological: A/O x 3, no focal deficits  Psychiatric: Normal mood, normal affect  : No Gimenez  Skin: No rashes  Access: Not applicable  Back: No CVA tenderness    LABS:                        8.3    22.22 )-----------( 596      ( 10 Aug 2022 06:50 )             25.9     08-10    133<L>  |  94<L>  |  11  ----------------------------<  115<H>  4.1   |  31  |  0.32<L>    Ca    8.2<L>      10 Aug 2022 07:09            RADIOLOGY & ADDITIONAL STUDIES:

## 2022-08-10 NOTE — PROGRESS NOTE ADULT - PROBLEM SELECTOR PLAN 2
Last recorded BM 8/7  On miralax bid  On senna BID  On naloxegol Last BM 8/9  On miralax bid  On senna BID  On naloxegol

## 2022-08-11 NOTE — PROGRESS NOTE ADULT - PROBLEM SELECTOR PLAN 2
Fentanyl changed to 100 mcg  Dilaudid switched to PO   pain management following  Pain seems better controlled today

## 2022-08-11 NOTE — DISCHARGE NOTE PROVIDER - NSDCCPTREATMENT_GEN_ALL_CORE_FT
PRINCIPAL PROCEDURE  Procedure: Complex total replacement of hip joint  Findings and Treatment:

## 2022-08-11 NOTE — DISCHARGE NOTE PROVIDER - NSDCFUADDINST_GEN_ALL_CORE_FT
Dressing will be changed during office visit.   Out of bed, ambulate, weight bearing as tolerated-   Physical therapy to assist with exercise and help increase endurance.   Please contact Doctors office regarding arrangements for out patient Physical therapy.

## 2022-08-11 NOTE — DISCHARGE NOTE PROVIDER - NSDCCPCAREPLAN_GEN_ALL_CORE_FT
PRINCIPAL DISCHARGE DIAGNOSIS  Diagnosis: Multiple lesions of metastatic malignancy  Assessment and Plan of Treatment: right hip

## 2022-08-11 NOTE — DISCHARGE NOTE PROVIDER - DETAILS OF MALNUTRITION DIAGNOSIS/DIAGNOSES
This patient has been assessed with a concern for Malnutrition and was treated during this hospitalization for the following Nutrition diagnosis/diagnoses:     -  08/09/2022: Moderate protein-calorie malnutrition

## 2022-08-11 NOTE — PROGRESS NOTE ADULT - SUBJECTIVE AND OBJECTIVE BOX
Post op Day [8 ]    Patient resting without complaints.  No chest pain, SOB.  Doing well with bowel regimen, does have some nausea s/p dilaudid which she experiences frequently.  s/p zofran.    T(C): 36.7 (08-11-22 @ 05:32), Max: 36.8 (08-10-22 @ 08:48)  HR: 105 (08-11-22 @ 05:32) (103 - 110)  BP: 105/79 (08-11-22 @ 05:32) (105/79 - 120/76)  RR: 18 (08-11-22 @ 05:32) (18 - 18)  SpO2: 97% (08-11-22 @ 05:32) (94% - 98%)  Wt(kg): --    Exam:  Alert and Oriented, No Acute Distress  R hip dsgs c/d/i with soft/compressible compartments  abd pillow in place  Calves Soft, Non-tender bilaterally  +PF/DF/EHL/FHL  SILT  +DP Pulse                        8.3    22.22 )-----------( 596      ( 10 Aug 2022 06:50 )             25.9    08-10    133<L>  |  94<L>  |  11  ----------------------------<  115<H>  4.1   |  31  |  0.32<L>    Ca    8.2<L>      10 Aug 2022 07:09

## 2022-08-11 NOTE — DISCHARGE NOTE PROVIDER - NSDCMRMEDTOKEN_GEN_ALL_CORE_FT
aluminum hydroxide-magnesium hydroxide 200 mg-200 mg/5 mL oral suspension: 30 milliliter(s) orally 4 times a day, As needed, Indigestion  ciprofloxacin 500 mg oral tablet: 1 tab(s) orally every 12 hours  enoxaparin: 40 milligram(s) subcutaneous once a day  fentaNYL 100 mcg/hr transdermal film, extended release: 1 patch transdermal every 72 hours  HYDROmorphone 2 mg oral tablet: 5 tab(s) orally every 4 hours, As needed, Severe Pain (7 - 10)  HYDROmorphone 8 mg oral tablet: 1 tab(s) orally every 4 hours, As needed, Moderate Pain (4 - 6)  magnesium hydroxide 8% oral suspension: 30 milliliter(s) orally once a day, As needed, Constipation  Multiple Vitamins oral tablet: 1 tab(s) orally once a day  naloxegol 25 mg oral tablet: 1 tab(s) orally once a day  naloxone: 0.3 milligram(s) intravenously prn  pantoprazole 40 mg oral delayed release tablet: 1 tab(s) orally once a day (before a meal)  polyethylene glycol 3350 oral powder for reconstitution: 17 gram(s) orally 2 times a day  senna leaf extract oral tablet: 2 tab(s) orally every 12 hours   acetaminophen 325 mg oral tablet: 3 tab(s) orally every 6 hours, As Needed  aluminum hydroxide-magnesium hydroxide 200 mg-200 mg/5 mL oral suspension: 30 milliliter(s) orally 4 times a day, As needed, Indigestion  ciprofloxacin 500 mg oral tablet: 1 tab(s) orally every 12 hours  enoxaparin: 40 milligram(s) subcutaneous once a day  fentaNYL 100 mcg/hr transdermal film, extended release: 1 patch transdermal every 72 hours  HYDROmorphone 2 mg oral tablet: 5 tab(s) orally every 4 hours, As needed, Severe Pain (7 - 10)  HYDROmorphone 8 mg oral tablet: 1 tab(s) orally every 4 hours, As needed, Moderate Pain (4 - 6)  magnesium hydroxide 8% oral suspension: 30 milliliter(s) orally once a day, As needed, Constipation  Multiple Vitamins oral tablet: 1 tab(s) orally once a day  naloxegol 25 mg oral tablet: 1 tab(s) orally once a day  naloxone: 0.3 milligram(s) intravenously prn  pantoprazole 40 mg oral delayed release tablet: 1 tab(s) orally once a day (before a meal)  polyethylene glycol 3350 oral powder for reconstitution: 17 gram(s) orally 2 times a day  senna leaf extract oral tablet: 2 tab(s) orally every 12 hours

## 2022-08-11 NOTE — DISCHARGE NOTE PROVIDER - PROVIDER TOKENS
PROVIDER:[TOKEN:[[id number]]],PROVIDER:[TOKEN:[[id number]]],PROVIDER:[TOKEN:[[id number]]],PROVIDER:[TOKEN:[[id number]]]

## 2022-08-11 NOTE — PROGRESS NOTE ADULT - SUBJECTIVE AND OBJECTIVE BOX
62y Female presents as direct transfer from [facility], c/o R hip pain that begain in May, then this past weekend was unable to bear weight and went to nearby hospital, where she was found to have R acetabular lesion, lung lesions and brain lesions per pt. R hip lesion was biopsied at prior hospital and consistent with metastatic lung cancer. Patient denies numbness. Denies trauma. Patient denies headstrike or LOC. Patient denies radiation of pain. Patient denies numbness/tingling/burning in the RLE. No other bone/joint complaints. Patient is a community ambulator at baseline without assistive devices. Patient seen now resting with continue complaint of pain in the right leg. Patient s/p CT scan which shows stool in the colon.  Pain seems better controlled. Patient states pain is better controlled today.       MEDICATIONS  (STANDING):  ciprofloxacin     Tablet 500 milliGRAM(s) Oral every 12 hours  enoxaparin Injectable 40 milliGRAM(s) SubCutaneous every 24 hours  fentaNYL   Patch 100 MICROgram(s)/Hr 1 Patch Transdermal every 72 hours  multivitamin 1 Tablet(s) Oral daily  naloxegol 25 milliGRAM(s) Oral daily  pantoprazole    Tablet 40 milliGRAM(s) Oral before breakfast  polyethylene glycol 3350 17 Gram(s) Oral two times a day  senna 2 Tablet(s) Oral every 12 hours    MEDICATIONS  (PRN):  aluminum hydroxide/magnesium hydroxide/simethicone Suspension 30 milliLiter(s) Oral four times a day PRN Indigestion  bisacodyl Suppository 10 milliGRAM(s) Rectal daily PRN Constipation  HYDROmorphone   Tablet 8 milliGRAM(s) Oral every 4 hours PRN Moderate Pain (4 - 6)  HYDROmorphone   Tablet 10 milliGRAM(s) Oral every 4 hours PRN Severe Pain (7 - 10)  magnesium hydroxide Suspension 30 milliLiter(s) Oral daily PRN Constipation  naloxone Injectable 0.3 milliGRAM(s) IV Push every 3 minutes PRN obtundation, RR <10  ondansetron Injectable 4 milliGRAM(s) IV Push every 6 hours PRN Nausea and/or Vomiting          VITALS:   T(C): 36.7 (08-11-22 @ 12:06), Max: 36.8 (08-10-22 @ 16:59)  HR: 101 (08-11-22 @ 12:06) (100 - 110)  BP: 113/83 (08-11-22 @ 12:06) (105/79 - 116/71)  RR: 18 (08-11-22 @ 12:06) (18 - 18)  SpO2: 96% (08-11-22 @ 12:06) (94% - 98%)  Wt(kg): --    PHYSICAL EXAM:  GENERAL: NAD, well-groomed, well-developed  HEAD:  Atraumatic, Normocephalic  EYES: EOMI, PERRLA, conjunctiva and sclera clear  ENMT: No tonsillar erythema, exudates, or enlargement; Moist mucous membranes, Good dentition, No lesions  NECK: Supple, No JVD, Normal thyroid  NERVOUS SYSTEM:  Alert & Oriented X3, Good concentration;   CHEST/LUNG: Clear to percussion bilaterally; No rales, rhonchi, wheezing, or rubs  HEART: Regular rate and rhythm; No murmurs, rubs, or gallops  ABDOMEN: Soft, Nontender, Nondistended; Bowel sounds present  EXTREMITIES:  2+ Peripheral Pulses, No clubbing, cyanosis, or edema  LYMPH: No lymphadenopathy noted  SKIN: No rashes or lesions    LABS:        CBC Full  -  ( 10 Aug 2022 06:50 )  WBC Count : 22.22 K/uL  RBC Count : 2.79 M/uL  Hemoglobin : 8.3 g/dL  Hematocrit : 25.9 %  Platelet Count - Automated : 596 K/uL  Mean Cell Volume : 92.8 fl  Mean Cell Hemoglobin : 29.7 pg  Mean Cell Hemoglobin Concentration : 32.0 gm/dL  Auto Neutrophil # : x  Auto Lymphocyte # : x  Auto Monocyte # : x  Auto Eosinophil # : x  Auto Basophil # : x  Auto Neutrophil % : x  Auto Lymphocyte % : x  Auto Monocyte % : x  Auto Eosinophil % : x  Auto Basophil % : x    08-10    133<L>  |  94<L>  |  11  ----------------------------<  115<H>  4.1   |  31  |  0.32<L>    Ca    8.2<L>      10 Aug 2022 07:09            CAPILLARY BLOOD GLUCOSE          RADIOLOGY & ADDITIONAL TESTS:

## 2022-08-11 NOTE — DISCHARGE NOTE PROVIDER - HOSPITAL COURSE
History of Present Illness:   62y Female presents as direct transfer from [facility], c/o R hip pain that began in May, then this past weekend was unable to bear weight and went to nearby hospital, where she was found to have R acetabular lesion, lung lesions and brain lesions per pt. R hip lesion was biopsied at prior hospital and consistent with metastatic lung cancer. Patient denies numbness. Denies trauma. Patient denies headstrike or LOC. Patient denies radiation of pain. Patient denies numbness/tingling/burning in the RLE. No other bone/joint complaints. Patient is a community ambulator at baseline without assistive devices.    PMHX:  lung cancer - metastatic  nephrolithiasis with hematuria    PSHX:  orthopedic hand surgery - bilateral  right pelvis biopsy    Allergies    No Known Allergies    7/27/22- Patient transferred to [facility] for right hip definitive procedure of right hip lesion. Patient was admitted and medically cleared for surgery.  Patient was worked up with CT, and other tests. Patient with severe pain multiple narcotics used unable to tolerate palliative medicine consulted to assist with pain management.                     Patient developed constipation from narcotic use treated prior to surgery with assistance of Gastrointestinal medicine.   8/3/22- Patient taken to surgery for excision of right acetabular mass and right total hip replacement- tolerated procedure without complications.  Postop patient with tachycardia, DVT and PE w/u negative, but multiple met lesion seen on CXR.   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     History of Present Illness:   62y Female presents as direct transfer from [facility], c/o R hip pain that began in May, then this past weekend was unable to bear weight and went to nearby hospital, where she was found to have R acetabular lesion, lung lesions and brain lesions per pt. R hip lesion was biopsied at prior hospital and consistent with metastatic lung cancer. Patient denies numbness. Denies trauma. Patient denies headstrike or LOC. Patient denies radiation of pain. Patient denies numbness/tingling/burning in the RLE. No other bone/joint complaints. Patient is a community ambulator at baseline without assistive devices.    PMHX:  lung cancer - metastatic  nephrolithiasis with hematuria    PSHX:  orthopedic hand surgery - bilateral  right pelvis biopsy    Allergies    No Known Allergies    7/27/22- Patient transferred to [facility] for right hip definitive procedure of right hip lesion. Patient was admitted and medically cleared for surgery.  Patient was worked up with CT, and other tests. Patient with severe pain multiple narcotics used unable to tolerate palliative medicine consulted to assist with pain management.                     Patient developed constipation from narcotic use treated prior to surgery with assistance of Gastrointestinal medicine.   8/3/22- Patient taken to surgery for excision of right acetabular mass and right total hip replacement- tolerated procedure without complications.  Postop patient with tachycardia, DVT and PE w/u negative, but multiple met lesion seen on CXR.   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  Patient had Gimenez cath placed 2nd to urinary retention, trial a void attempted without success. Reattempt when increased ambulation achieved.  Patient was evaluated by Physical therapy whom recommended rehab for discharge plan.  Patient stable for discharge when bed is available. Needs follow up with surgeon, hem/onc, medicine, palliative within next few weeks.

## 2022-08-11 NOTE — DISCHARGE NOTE PROVIDER - CARE PROVIDERS DIRECT ADDRESSES
[email or web address]

## 2022-08-11 NOTE — PROGRESS NOTE ADULT - SUBJECTIVE AND OBJECTIVE BOX
[facility] - PROGRESS NOTE    CHIEF COMPLAINT: lung cancer; malignant pleural effusion; atelectasis; COPD; emphysema; pleurisy; pathologic hip fracture    INTERVAL HISTORY: difficulty with physical therapy yesterday due to timing of pain medications; olmos catheter remains in; having more regular bowel movements; pain at the surgical site is less well controlled on oral analgesics; no shortness of breath or hypoxemia on room air; no cough, sputum production, chest congestion or wheeze; no fevers, chills or sweats; no fatigue, malaise or weakness; no anterior chest pain/pressure or palpitations; right sided chest pain exacerbated by deep inspiration; on cipro for a UTI    REVIEW OF SYSTEMS:  Constitutional: As per interval history  HEENT: Within normal limits  CV: As per interval history  Resp: As per interval history  GI: bowel inertia/constipation due to narcotics  : urinary retention  Musculoskeletal: s/p right acetabular mass excision with a complex right total hip replacement on 8/3  Skin: Within normal limits  Neurological: Within normal limits  Psychiatric: Within normal limits  Endocrine: Within normal limits  Hematologic/Lymphatic: metastatic lung cancer  Allergic/Immunologic: Within normal limits    MEDICATIONS:     Pulmonary "    Anti-microbials:  ciprofloxacin     Tablet 500 milliGRAM(s) Oral every 12 hours    Cardiovascular:    Other:  enoxaparin Injectable 40 milliGRAM(s) SubCutaneous every 24 hours  fentaNYL   Patch 100 MICROgram(s)/Hr 1 Patch Transdermal every 72 hours  multivitamin 1 Tablet(s) Oral daily  naloxegol 25 milliGRAM(s) Oral daily  pantoprazole    Tablet 40 milliGRAM(s) Oral before breakfast  polyethylene glycol 3350 17 Gram(s) Oral two times a day  senna 2 Tablet(s) Oral every 12 hours    MEDICATIONS  (PRN):  aluminum hydroxide/magnesium hydroxide/simethicone Suspension 30 milliLiter(s) Oral four times a day PRN Indigestion  bisacodyl Suppository 10 milliGRAM(s) Rectal daily PRN Constipation  HYDROmorphone   Tablet 8 milliGRAM(s) Oral every 4 hours PRN Moderate Pain (4 - 6)  HYDROmorphone   Tablet 10 milliGRAM(s) Oral every 4 hours PRN Severe Pain (7 - 10)  HYDROmorphone  Injectable 2.5 milliGRAM(s) IV Push every 2 hours PRN breakthrough Pain  magnesium hydroxide Suspension 30 milliLiter(s) Oral daily PRN Constipation  naloxone Injectable 0.3 milliGRAM(s) IV Push every 3 minutes PRN obtundation, RR <10  ondansetron Injectable 4 milliGRAM(s) IV Push every 6 hours PRN Nausea and/or Vomiting    OBJECTIVE:    POCT Blood Glucose.: 180 mg/dL (10 Aug 2022 12:50)    PHYSICAL EXAM:       ICU Vital Signs Last 24 Hrs  T(C): 36.8 (11 Aug 2022 09:), Max: 36.8 (10 Aug 2022 16:59)  T(F): 98.2 (11 Aug 2022 09:), Max: 98.3 (10 Aug 2022 16:59)  HR: 100 (11 Aug 2022 09:) (100 - 110)  BP: 116/71 (11 Aug 2022 09:) (105/79 - 120/76)  BP(mean): --  ABP: --  ABP(mean): --  RR: 18 (11 Aug 2022 09:) (18 - 18)  SpO2: 95% (11 Aug 2022 09:) (94% - 98%) on room air     General: Awake. Alert. Cooperative. No distress. Appears stated age 	  HEENT: Atraumatic. Normocephalic. Anicteric. Normal oral mucosa. PERRL. EOMI.  Neck: Supple. Trachea midline. Thyroid without enlargement/tenderness/nodules. No carotid bruit. No JVD.	  Cardiovascular: Tachycardic rate and rhythm. S1 S2 normal. No murmurs, rubs or gallops.  Respiratory: Respirations unlabored. Decreased breath sounds right base with dullness to percussion. No curvature.  Abdomen: Soft. Non-tender. Non-distended. No organomegaly. No masses. Normal bowel sounds. Olmos catheter.  Extremities: Warm to touch. No clubbing or cyanosis. No pedal edema. Abductor pillow in place. Right thigh edema and pain to palpation  Pulses: 2+ peripheral pulses all extremities.	  Skin: Normal skin color. No rashes or lesions. No ecchymoses. No cyanosis. Warm to touch.  Lymph Nodes: Cervical, supraclavicular and axillary nodes normal  Neurological: Motor and sensory examination equal and normal. A and O x 3  Psychiatry: Appropriate mood and affect.    LABS:                          8.3    22.22 )-----------( 596      ( 10 Aug 2022 06:50 )             25.9     CBC    WBC  22.22 <==, 18.59 <==, 16.33 <==, 22.89 <==, 21.21 <==, 14.85 <==    Hemoglobin  8.3 <<==, 8.2 <<==, 8.6 <<==, 9.2 <<==, 9.1 <<==, 9.0 <<==    Hematocrit  25.9 <==, 25.9 <==, 26.6 <==, 28.9 <==, 28.4 <==, 28.0 <==    Platelets  596 <==, 592 <==, 501 <==, 430 <==, 385 <==, 347 <==      133<L>  |  94<L>  |  11  ----------------------------<  115<H>    08-10  4.1   |  31  |  0.32<L>      LYTES    sodium  133 <==, 135 <==, 133 <==, 133 <==, 134 <==, 135 <==    potassium   4.1 <==, 4.1 <==, 4.1 <==, 3.7 <==, 3.8 <==, 4.0 <==    chloride  94 <==, 97 <==, 96 <==, 92 <==, 97 <==, 99 <==    carbon dioxide  31 <==, 31 <==, 29 <==, 26 <==, 26 <==, 22 <==    =============================================================================================  RENAL FUNCTION:    Creatinine:   0.32  <<==, 0.37  <<==, 0.37  <<==, <0.30  <<==, 0.41  <<==, 0.37  <<==    BUN:   11 <==, 9 <==, 10 <==, 11 <==, 14 <==, 18 <==    ============================================================================================    calcium   8.2 <==, 8.0 <==, 8.0 <==, 8.0 <==, 7.8 <==, 7.8 <==    ============================================================================================  MICROBIOLOGY:     COVID-19 PCR . (22 @ 21:22)   COVID-19 PCR: NotDetec:      Urinalysis Basic - ( 08 Aug 2022 09:25 )    Color: Light Yellow / Appearance: Clear / S.007 / pH: x  Gluc: x / Ketone: Negative  / Bili: Negative / Urobili: Negative   Blood: x / Protein: Negative / Nitrite: Negative   Leuk Esterase: Large / RBC: 4 /hpf / WBC 60 /HPF   Sq Epi: x / Non Sq Epi: 0 /hpf / Bacteria: Many    Culture - Urine (22 @ 11:28)   Specimen Source: Catheterized Catheterized   Culture Results:   >100,000 CFU/ml Enterobacter cloacae complex     RADIOLOGY:  [x ] Chest radiographs reviewed and interpreted by me    EXAM:  XR CHEST PORTABLE URGENT 1V                          PROCEDURE DATE:  2022      FINDINGS:  The heart is normal in size.  Small right pleural effusion with associated atelectasis.  No pneumothorax.    IMPRESSION:  Small right pleural effusion with associated atelectasis.    ROD MARTINEZ MD; Resident Radiologist  This document has been electronically signed.  PUMA KAM MD; Attending Interventional Radiologist  This document has been electronically signed. Aug  8 2022 10:17AM  ---------------------------------------------------------------------------------------------------------------  EXAM:  CT ANGIO CHEST PULM ART WAWIC                          PROCEDURE DATE:  2022      FINDINGS:    PULMONARY ANGIOGRAM:  No pulmonary embolism.    LUNGS/AIRWAYS/PLEURA: No endobronchial lesion. Several bilateral lung   nodules for example, largest 1.8 cm in the right lower lobe (5-65).   Peripheral and peribronchovascular groundglass in the upper lobes. Mild   emphysema. Right lower lobe passive atelectasis. Small right pleural   effusion in association with thickening of the parietal pleura (for   example, 5-106).    LYMPH NODES/MEDIASTINUM: Right hilar and perihilar lymph nodes up to 1.2   cm. Fluid within the esophagus up to the aortic arch. Mild esophageal   wall thickening.    HEART/VASCULATURE: Normal heart size. Unremarkable pericardium. Normal   caliber aorta.    UPPER ABDOMEN: Stable hepatic cyst and bilateral adrenal thickening.   Cholecystectomy.    BONES/SOFT TISSUES: Multiple bone lesions, for example, the L1 vertebral   body and the left ninth rib. Asymmetric nodular density in the upper   outer left breast.      IMPRESSION:    No pulmonary embolism.    Several bilateral lung nodules, compatible with metastases. Concurrent   mild groundglass in the upper lobes, likely infectious or inflammatory.    Small right pleural effusion associated with right pleural thickening,   raising suspicion for a malignant pleural effusion.    Right hilar/perihilar lymphadenopathy.    Multiple bone metastases.    Asymmetrically dense upper outer left breast, not well characterized with   CT.    ANNIE SHEN M.D., ATTENDING RADIOGIST  This document has been electronically signed. Aug  9 2022  8:32AM    ---------------------------------------------------------------------------------------------------------------  EXAM:  DUPLEX SCAN EXT VEINS LOWER BI                          PROCEDURE DATE:  2022      IMPRESSION:  No evidence of deep venous thrombosis in either lower extremity.    FLACA MCCLELLAND MD; Attending Radiologist  This document has been electronically signed. Aug  9 2022  1:42PM  ---------------------------------------------------------------------------------------------------------------  EXAM:  CT ABDOMEN AND PELVIS                          PROCEDURE DATE:  2022      IMPRESSION:  Large soft tissue mass with gross destruction of the right acetabulum,   and inferior and superior pubic rami highly concerning for malignancy.   The soft tissue mass is inseparable from a small portion the right side   of the bladder and invasion cannot be excluded.. Lytic lesion in L1   vertebral body. Multiple bilateral pleural nodules highly concerning for   metastatic disease. Moderate right pleural effusion with mild posterior   pleural thickening.. Pleural-based metastases cannot be excluded. Right   external iliac lymph node raises concern for metastatic disease.    Gross distention of the ascending colon. There is a large amount of stool   and this may be due to fecal impaction. [place] syndrome would be   considered if fecal impaction is relieved and dilatation persisted.   Please correlate clinically.    Mild bilateral hydronephrosis which may be due to the very distended   bladder.    ANNIE MENJIVAR MD; Attending Radiologist  This document has been electronically signed. 2022  5:12PM  ---------------------------------------------------------------------------------------------------------------  EXAM:  NM BONE SPECT CT SA SD                        EXAM:  NM BONE IMG WHOLE BODY                          PROCEDURE DATE:  2022      IMPRESSION: Abnormal bone scan.    Mildly heterogeneous tracer activity in the right anterior acetabulum   corresponding to sclerotic density on CT. Destructive lesion in the right   posterior acetabulum and right ischium with difficult to delineate soft   tissue component on CT are not visualized on the bone scan.    Mildly heterogeneous tracer activity in the right proximal femur with no   definite abnormality on CT.    GLENDA RUIZ MD; Attending Nuclear Medicine  This document has been electronically signed. Aug  1 2022  5:40PM  ---------------------------------------------------------------------------------------------------------------

## 2022-08-11 NOTE — DISCHARGE NOTE PROVIDER - NSDCFUADDAPPT_GEN_ALL_CORE_FT
Please call Dr. Lang' s office within next few days to schedule a follow up appointment about 14 days after surgery.

## 2022-08-11 NOTE — DISCHARGE NOTE PROVIDER - CARE PROVIDER_API CALL
Mayi Lang)  [facility]  [address], [address]  [place] [address]  Phone: [phone]  Fax: [phone]  Follow Up Time:     Pablo Almeida (DO)  Gastroenterology; Internal Medicine  [address] [place] [address] [address]  [place] [address]  Phone: [phone]  Fax: [phone]  Follow Up Time:     [facility]  [address] [place] [address]  Phone: [phone]  Fax: [phone]  Follow Up Time:     Arben Jackson)  Internal Medicine; Pulmonary Disease  [address], [address]  [place] [address]  Phone: [phone]  Fax: [phone]  Follow Up Time:

## 2022-08-11 NOTE — PROGRESS NOTE ADULT - ASSESSMENT
63 yo F with metastatic lung cancer to bone and brain p/w R pathologic hip fracture s/p wide resection and total hip replacement on 8/3. Palliative following for pain management. Palliative care called for pain management.

## 2022-08-11 NOTE — PROGRESS NOTE ADULT - SUBJECTIVE AND OBJECTIVE BOX
Patient is a 62y Female     Patient is a 62y old  Female who presents with a chief complaint of Excision of R pelvic mass, RTHA (11 Aug 2022 06:03)      HPI:  62y Female presents as direct transfer from [facility], c/o R hip pain that begain in May, then this past weekend was unable to bear weight and went to nearby hospital, where she was found to have R acetabular lesion, lung lesions and brain lesions per pt. R hip lesion was biopsied at prior hospital and consistent with metastatic lung cancer. Patient denies numbness. Denies trauma. Patient denies headstrike or LOC. Patient denies radiation of pain. Patient denies numbness/tingling/burning in the RLE. No other bone/joint complaints. Patient is a community ambulator at baseline without assistive devices.    PAST MEDICAL & SURGICAL HISTORY:    MEDICATIONS  (STANDING):  acetaminophen     Tablet .. 975 milliGRAM(s) Oral every 8 hours  enoxaparin Injectable 40 milliGRAM(s) SubCutaneous every 24 hours  senna 2 Tablet(s) Oral at bedtime  sodium chloride 0.9%. 1000 milliLiter(s) (75 mL/Hr) IV Continuous <Continuous>    MEDICATIONS  (PRN):  HYDROmorphone  Injectable 0.5 milliGRAM(s) IV Push every 6 hours PRN Severe Pain (7 - 10)  magnesium hydroxide Suspension 30 milliLiter(s) Oral daily PRN Constipation  melatonin 3 milliGRAM(s) Oral at bedtime PRN Insomnia  oxyCODONE    IR 2.5 milliGRAM(s) Oral every 4 hours PRN Moderate Pain (4 - 6)  oxyCODONE    IR 5 milliGRAM(s) Oral every 4 hours PRN Severe Pain (7 - 10)    Allergies    No Known Allergies    Intolerances                    T(C): 36.7 (07-28-22 @ 23:04), Max: 36.7 (07-28-22 @ 23:04)  HR: 114 (07-28-22 @ 23:04) (114 - 114)  BP: 126/87 (07-28-22 @ 23:04) (126/87 - 126/87)  RR: 18 (07-28-22 @ 23:04) (18 - 18)  SpO2: 93% (07-28-22 @ 23:04) (93% - 93%)  Wt(kg): --    PE   RLE:  Skin intact; No ecchymosis/soft tissue swelling  Compartments soft; + TTP about hip. No TTP to knee/leg/ankle/foot   ROM lmited 2/2 pain   Unable to SLR;   Motor intact GS/TA/FHL/EHL  SILT L2-S1  +dp    LLE/BUE:   No bony TTP; Good ROM w/o pain; Exam Unremarkable    Imaging:  XR and CT showing R acetabular lesion    62y Female with R acetabular lesion    - PWB RLE with rolling walker  - Pain control  - FU labs   - FU medicine and heme onc clearance  - Lovenox for dvt ppx  - Plan for OR Wednesday 8/3   (28 Jul 2022 23:54)      PAST MEDICAL & SURGICAL HISTORY:      MEDICATIONS  (STANDING):  ciprofloxacin     Tablet 500 milliGRAM(s) Oral every 12 hours  enoxaparin Injectable 40 milliGRAM(s) SubCutaneous every 24 hours  fentaNYL   Patch 100 MICROgram(s)/Hr 1 Patch Transdermal every 72 hours  multivitamin 1 Tablet(s) Oral daily  naloxegol 25 milliGRAM(s) Oral daily  pantoprazole    Tablet 40 milliGRAM(s) Oral before breakfast  polyethylene glycol 3350 17 Gram(s) Oral two times a day  senna 2 Tablet(s) Oral every 12 hours      Allergies    No Known Allergies    Intolerances        SOCIAL HISTORY:  Denies ETOh,Smoking,     FAMILY HISTORY:      REVIEW OF SYSTEMS:    CONSTITUTIONAL: No weakness, fevers or chills  EYES/ENT: No visual changes;  No vertigo or throat pain   NECK: No pain or stiffness  RESPIRATORY: No cough, wheezing, hemoptysis; No shortness of breath  CARDIOVASCULAR: No chest pain or palpitations  GASTROINTESTINAL: No abdominal or epigastric pain. No nausea, vomiting, or hematemesis; No diarrhea or constipation. No melena or hematochezia.  GENITOURINARY: No dysuria, frequency or hematuria  NEUROLOGICAL: No numbness or weakness  SKIN: No itching, burning, rashes, or lesions   All other review of systems is negative unless indicated above.    VITAL:  T(C): , Max: 36.8 (08-10-22 @ 08:48)  T(F): , Max: 98.3 (08-10-22 @ 08:48)  HR: 105 (08-11-22 @ 05:32)  BP: 105/79 (08-11-22 @ 05:32)  BP(mean): --  RR: 18 (08-11-22 @ 05:32)  SpO2: 97% (08-11-22 @ 05:32)  Wt(kg): --    I and O's:    08-08 @ 07:01  -  08-09 @ 07:00  --------------------------------------------------------  IN: 1120 mL / OUT: 1950 mL / NET: -830 mL    08-09 @ 07:01  -  08-10 @ 07:00  --------------------------------------------------------  IN: 480 mL / OUT: 1050 mL / NET: -570 mL    08-10 @ 07:01  -  08-11 @ 06:42  --------------------------------------------------------  IN: 720 mL / OUT: 1201 mL / NET: -481 mL          PHYSICAL EXAM:    Constitutional: NAD  HEENT: PERRLA,   Neck: No JVD  Respiratory: CTA B/L  Cardiovascular: S1 and S2  Gastrointestinal: BS+, soft, NT/ND  Extremities: No peripheral edema  Neurological: A/O x 3, no focal deficits  Psychiatric: Normal mood, normal affect  : No Gimenez  Skin: No rashes  Access: Not applicable  Back: No CVA tenderness    LABS:                        8.3    22.22 )-----------( 596      ( 10 Aug 2022 06:50 )             25.9     08-10    133<L>  |  94<L>  |  11  ----------------------------<  115<H>  4.1   |  31  |  0.32<L>    Ca    8.2<L>      10 Aug 2022 07:09            RADIOLOGY & ADDITIONAL STUDIES:

## 2022-08-11 NOTE — PROGRESS NOTE ADULT - ASSESSMENT
ASSESSMENT:    62 year old gentlewoman, current smoker, recently diagnosed with metastatic lung cancer on a biopsy of a right pelvic lesion at [facility] with evidence of disease in the brain and bone. PET/CT -> 2cm FDG avid superior segment of the right lower lobe nodule associated with hypermetabolic right hilar adenopathy - trace right pleural effusion with metastatic disease to the right pleura - 7cm destructive osseous lesion involving the right acetabulum - hypermetabolic lytic lesion in the L1 vertebral body. Brain MRI -> 3 subcentimeter metastases the largest being 8mm in the left frontal lobe - no vasogenic edema. The patient was started on radiation therapy which was stopped after only two sessions due to the patient's inability to bear weight related due to intractable pain. She was transferred to [place] for further treatment and evaluation. Imaging revealed a large soft tissue mass with gross destruction of the right acetabulum and the inferior and superior pubic rami highly concerning for malignancy - the soft tissue mass is inseparable from a small portion the right side of the bladder and invasion cannot be excluded - lytic lesion in L1 vertebral body. The patient underwent right acetabular mass excision with a complex right total hip replacement on 8/3. 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 She had a bowel movement yesterday. She is receiving nutritional support for protein calorie malnutrition. The pain in her right leg is somewhat improved and she walked in the hallway with physical therapy yesterday. Urinary retention has required placement of a olmos catheter. She has persistent tachycardia without shortness of breath or hypoxemia on room air. She has no cough, sputum production, chest congestion or wheeze. No fevers, chills or sweats. No fatigue, malaise or weakness. She has no anterior chest pain/pressure or palpitations. She has right sided chest pain exacerbated by deep inspiration.       the patient has advanced non-small cell lung cancer - the primary lesion is in the right lower lobe - there is evidence of intrathoracic spread of disease to the right hilar nodes and right pleura with a malignant right pleural effusion - the peripheral and peribronchovascular groundglass opacities in the upper lobes likely reflect smoking related bronchiolitis - there are 3 subcentimeter lesions in the brain - her presenting symptoms were due to a 7cm destructive osseous lesion involving the right acetabulum with a hypermetabolic lytic lesion in the L1 vertebral body.    the patient underwent right acetabular mass excision with a complex right total hip replacement on 8/3 - post operative course has been notable for:  1) severe pain that is now improving  2) colonic inertia due to high dose narcotics   3) urinary retention due to immobility  4) tachycardia related to pain, anxiety and hypovolemia    PLAN/RECOMMENDATIONS:    stable oxygenation on room air  observe off pulmonary medications and antibiotics  use of the incentive spirometer at bedside encouraged  conservative management of the malignant pleural effusion and bronchiolitis unless the patient develops respiratory symptoms  would check to make sure that the patient does not need steroids and prophylactic anticonvulsants for her CNS lesions  follow-up with radiation oncology and medical oncology is arranged at [facility]  would attempt a trial of void   analgesics decreasing narcotics as able - on fentanyl patch/po dilaudid  GI/DVT prophylaxis - protonix/SQ lovenox  bowel regimen - naloxegol/miralax/senna  on po cipro for an Enterobacter cloacae complex UTI    Will follow with you. Plan of care discussed with the patient at bedside and with the orthopedic team.    Arben Jackson MD, [facility]  [phone]  Pulmonary Medicine

## 2022-08-11 NOTE — PROGRESS NOTE ADULT - PROBLEM SELECTOR PLAN 1
Predominant symptom: pain  Likely due to pathologic fracture  Duration of PRN: 2 hours    Recommendations:   Increase fentanyl patch from 75 mcg/hr to 100 mcg/hr  add dilaudid PO 8 mg q 4 for moderate pain after PT today  add dilaudid PO 10 mg q 6 for severe pain after PT today  discontinue all IVP dilaudid   please order narcan prn    Risk mitigation/Bowel regimen: last BM 8/10 per patient report. c/w bowel regimen with senna, miralax and naloxogel as ordered and monitor BM's  Adverse events noted: non reported or observed  Recommendations discussed with ortho PA

## 2022-08-11 NOTE — PROGRESS NOTE ADULT - PROBLEM SELECTOR PLAN 3
Patient has been following up with a oncologist in [place]  States she recently had a bone biopsy but doesn't have the results  will need to eventually start chemo +/- RT.

## 2022-08-11 NOTE — PROGRESS NOTE ADULT - PROBLEM SELECTOR PLAN 1
PT/OT-WBAT, post prec  consultants appreciated  IS  DVT PPx  Pain Control  Continue Current Tx.  Dispo plan is acute rehab    Trever Aalnis PA-C  Team Pager: #[phone]

## 2022-08-11 NOTE — PROGRESS NOTE ADULT - SUBJECTIVE AND OBJECTIVE BOX
SUBJECTIVE AND OBJECTIVE: NAEO. Pt seen and examined at bedside.  Feels well this am. Pain controlled with oral dosing.  Pt trying to time meds prior to movement as pain is more severe with movement.  Pt is looking forward to rehab.  Pt required 3 doses of dilaudid po prn    Allergies    No Known Allergies    Intolerances    MEDICATIONS  (STANDING):  ciprofloxacin     Tablet 500 milliGRAM(s) Oral every 12 hours  enoxaparin Injectable 40 milliGRAM(s) SubCutaneous every 24 hours  fentaNYL   Patch  75 MICROgram(s)/Hr 1 Patch Transdermal every 72 hours  multivitamin 1 Tablet(s) Oral daily  naloxegol 25 milliGRAM(s) Oral daily  pantoprazole    Tablet 40 milliGRAM(s) Oral before breakfast  polyethylene glycol 3350 17 Gram(s) Oral two times a day  senna 2 Tablet(s) Oral every 12 hours    MEDICATIONS  (PRN):  aluminum hydroxide/magnesium hydroxide/simethicone Suspension 30 milliLiter(s) Oral four times a day PRN Indigestion  bisacodyl Suppository 10 milliGRAM(s) Rectal daily PRN Constipation  HYDROmorphone  Injectable 1.5 milliGRAM(s) IV Push every 2 hours PRN Moderate Pain (4 - 6)  HYDROmorphone  Injectable 2 milliGRAM(s) IV Push every 2 hours PRN Severe Pain (7 - 10)  HYDROmorphone  Injectable 2.5 milliGRAM(s) IV Push every 2 hours PRN breakthrough Pain  magnesium hydroxide Suspension 30 milliLiter(s) Oral daily PRN Constipation  ondansetron Injectable 4 milliGRAM(s) IV Push every 6 hours PRN Nausea and/or Vomiting      ITEMS UNCHECKED ARE NOT PRESENT    PRESENT SYMPTOMS: [ ]Unable to self-report - see [ ] CPOT [ ] PAINADS [ ] RDOS  Source if other than patient:  [ ]Family   [ ]Team     Pain: [X] yes [ ] no  QOL impact - severe  Location - R. hip pain                    Aggravating factors - movement  Quality -sharp  Radiation - leg   Timing- with movement  Severity (0-10 scale): severe, 10  Minimal acceptable level (0-10 scale): 3-4    CPOT:    https://www.sccm.org/getattachment/boj36c37-3s2q-0y5l-1c8t-3282t8664q9l/Critical-Care-Pain-Observation-Tool-(CPOT)    PAIN AD Score:	  http://geriatrictoolkit.[email or web address]/painad.pdf (Ctrl + left click to view)    Dyspnea:                           [ ]Mild [ ]Moderate [ ]Severe    RDOS: 0  0 to 2  minimal or no respiratory distress   3  mild distress  4 to 6 moderate distress  >7 severe distress  https://homecareinformation.net/handouts/hen/Respiratory_Distress_Observation_Scale.pdf (Ctrl +  left click to view)     Anxiety:                             [X ]Mild [ ]Moderate [ ]Severe  Fatigue:                             [ ]Mild [ ]Moderate [ ]Severe  Nausea:                             [ ]Mild [ ]Moderate [ ]Severe  Loss of appetite:              [ ]Mild [ ]Moderate [ ]Severe  Constipation:                    [ ]Mild [ ]Moderate [ ]Severe    PCSSQ[Palliative Care Spiritual Screening Question]   Severity (0-10):  Score of 4 or > indicate consideration of Chaplaincy referral.  Chaplaincy Referral: [ ] yes [ ] refused [ ] following    Other Symptoms:  [ X]All other review of systems negative     Palliative Performance Status Version 2:    40-50     %      http://npcrc.org/files/news/palliative_performance_scale_ppsv2.pdf  PHYSICAL EXAM:  Vital Signs Last 24 Hrs  T(C): 36.7 (10 Aug 2022 12:52), Max: 37.2 (10 Aug 2022 04:30)  T(F): 98 (10 Aug 2022 12:52), Max: 98.9 (10 Aug 2022 04:30)  HR: 105 (10 Aug 2022 12:52) (105 - 118)  BP: 120/76 (10 Aug 2022 12:52) (110/73 - 122/75)  BP(mean): --  RR: 18 (10 Aug 2022 12:52) (18 - 18)  SpO2: 95% (10 Aug 2022 12:52) (94% - 96%)    Parameters below as of 10 Aug 2022 12:52  Patient On (Oxygen Delivery Method): room air     I&O's Summary    09 Aug 2022 07:01  -  10 Aug 2022 07:00  --------------------------------------------------------  IN: 480 mL / OUT: 1050 mL / NET: -570 mL    10 Aug 2022 07:01  -  10 Aug 2022 13:47  --------------------------------------------------------  IN: 240 mL / OUT: 300 mL / NET: -60 mL       GENERAL:   [x]Alert  [x]Oriented x 3  [ ]Lethargic  [ ]Cachexia  [ ]Unarousable  [x]Verbal  [ ]Non-Verbal  Behavioral:   [ ]Anxiety  [ ]Delirium [ ]Agitation [x ]Other- calm  HEENT:  [x]Normal   [ ]Dry mouth   [ ]ET Tube/Trach  [ ]Oral lesions  PULMONARY:   [x]Clear [ ]Tachypnea  [ ]Audible excessive secretions   [ ]Rhonchi        [ ]Right [ ]Left [ ]Bilateral  [ ]Crackles        [ ]Right [ ]Left [ ]Bilateral  [ ]Wheezing     [ ]Right [ ]Left [ ]Bilateral  [ ]Diminished BS [ ] Right [ ]Left [ ]Bilateral  CARDIOVASCULAR:    [x]Regular [ ]Irregular [ ]Tachy  [ ]Blaine [ ]Murmur [ ]Other  GASTROINTESTINAL:  [x]Soft  [ ]Distended   [x]+BS  [x]Non tender [ ]Tender  [ ]PEG [ ]OGT/ NGT   Last BM: 8/10   GENITOURINARY:  [x]Normal [ ]Incontinent   [ ]Oliguria/Anuria   [ ]Gimenez  MUSCULOSKELETAL:   [ ]Normal   [x]Weakness  [ ]Bed/Wheelchair bound [ ]Edema  NEUROLOGIC:   [x]No focal deficits  [ ] Cognitive impairment  [ ] Dysphagia [ ]Dysarthria [ ] Paresis [ ]Other     LABS:                              8.3    22.22 )-----------( 596      ( 10 Aug 2022 06:50 )             25.9   08-10    133<L>  |  94<L>  |  11  ----------------------------<  115<H>  4.1   |  31  |  0.32<L>    Ca    8.2<L>      10 Aug 2022 07:09          RADIOLOGY & ADDITIONAL STUDIES: no new imaging    Protein Calorie Malnutrition Present: [ ]mild [ ]moderate [ ]severe [ ]underweight [ ]morbid obesity  https://www.andeal.org/vault/9570/web/files/ONC/Table_Clinical%20Characteristics%20to%20Document%20Malnutrition-White%20JV%20et%20al%202012.pdf    Height (cm): 157.5 (08-03-22 @ 12:16)  Weight (kg): 55.8 (08-03-22 @ 12:16)  BMI (kg/m2): 22.5 (08-03-22 @ 12:16)    [ ]PPSV2 < or = 30%  [ ]significant weight loss [ ]poor nutritional intake [ ]anasarca[ ]Artificial Nutrition    Other REFERRALS:  [ ]Hospice  [ ]Child Life  [ ]Social Work  [ x]Case management [ ]Holistic Therapy     Goals of Care Document:

## 2022-08-11 NOTE — CHART NOTE - NSCHARTNOTEFT_GEN_A_CORE
Ortho PA Note    Spoke with palliative team and discussed active order for IV Dilaudid for breakthrough pain;  Patient has taken only PO Dilaudid since yesterday.  As per palliative team, may discontinue Dilaudid 2.5mg IVP Q2H prn breakthrough pain now.  Will continue to monitor patients pain control on PO Dilaudid.      JAY Aden  Orthopedic Surgery  [id number]

## 2022-08-12 NOTE — PROGRESS NOTE ADULT - PROBLEM SELECTOR PROBLEM 6
Urinary retention
Palliative care encounter
Palliative care encounter
Urinary retention
Palliative care encounter
Urinary retention
Palliative care encounter

## 2022-08-12 NOTE — PROGRESS NOTE ADULT - SUBJECTIVE AND OBJECTIVE BOX
Patient is a 62y Female     Patient is a 62y old  Female who presents with a chief complaint of Right acetabular mass (12 Aug 2022 06:19)      HPI:  62y Female presents as direct transfer from [facility], c/o R hip pain that begain in May, then this past weekend was unable to bear weight and went to nearby hospital, where she was found to have R acetabular lesion, lung lesions and brain lesions per pt. R hip lesion was biopsied at prior hospital and consistent with metastatic lung cancer. Patient denies numbness. Denies trauma. Patient denies headstrike or LOC. Patient denies radiation of pain. Patient denies numbness/tingling/burning in the RLE. No other bone/joint complaints. Patient is a community ambulator at baseline without assistive devices.    PAST MEDICAL & SURGICAL HISTORY:    MEDICATIONS  (STANDING):  acetaminophen     Tablet .. 975 milliGRAM(s) Oral every 8 hours  enoxaparin Injectable 40 milliGRAM(s) SubCutaneous every 24 hours  senna 2 Tablet(s) Oral at bedtime  sodium chloride 0.9%. 1000 milliLiter(s) (75 mL/Hr) IV Continuous <Continuous>    MEDICATIONS  (PRN):  HYDROmorphone  Injectable 0.5 milliGRAM(s) IV Push every 6 hours PRN Severe Pain (7 - 10)  magnesium hydroxide Suspension 30 milliLiter(s) Oral daily PRN Constipation  melatonin 3 milliGRAM(s) Oral at bedtime PRN Insomnia  oxyCODONE    IR 2.5 milliGRAM(s) Oral every 4 hours PRN Moderate Pain (4 - 6)  oxyCODONE    IR 5 milliGRAM(s) Oral every 4 hours PRN Severe Pain (7 - 10)    Allergies    No Known Allergies    Intolerances                    T(C): 36.7 (07-28-22 @ 23:04), Max: 36.7 (07-28-22 @ 23:04)  HR: 114 (07-28-22 @ 23:04) (114 - 114)  BP: 126/87 (07-28-22 @ 23:04) (126/87 - 126/87)  RR: 18 (07-28-22 @ 23:04) (18 - 18)  SpO2: 93% (07-28-22 @ 23:04) (93% - 93%)  Wt(kg): --    PE   RLE:  Skin intact; No ecchymosis/soft tissue swelling  Compartments soft; + TTP about hip. No TTP to knee/leg/ankle/foot   ROM lmited 2/2 pain   Unable to SLR;   Motor intact GS/TA/FHL/EHL  SILT L2-S1  +dp    LLE/BUE:   No bony TTP; Good ROM w/o pain; Exam Unremarkable    Imaging:  XR and CT showing R acetabular lesion    62y Female with R acetabular lesion    - PWB RLE with rolling walker  - Pain control  - FU labs   - FU medicine and heme onc clearance  - Lovenox for dvt ppx  - Plan for OR Wednesday 8/3   (28 Jul 2022 23:54)      PAST MEDICAL & SURGICAL HISTORY:      MEDICATIONS  (STANDING):  ciprofloxacin     Tablet 500 milliGRAM(s) Oral every 12 hours  enoxaparin Injectable 40 milliGRAM(s) SubCutaneous every 24 hours  fentaNYL   Patch 100 MICROgram(s)/Hr 1 Patch Transdermal every 72 hours  multivitamin 1 Tablet(s) Oral daily  naloxegol 25 milliGRAM(s) Oral daily  pantoprazole    Tablet 40 milliGRAM(s) Oral before breakfast  polyethylene glycol 3350 17 Gram(s) Oral two times a day  senna 2 Tablet(s) Oral every 12 hours      Allergies    No Known Allergies    Intolerances        SOCIAL HISTORY:  Denies ETOh,Smoking,     FAMILY HISTORY:      REVIEW OF SYSTEMS:    CONSTITUTIONAL: No weakness, fevers or chills  EYES/ENT: No visual changes;  No vertigo or throat pain   NECK: No pain or stiffness  RESPIRATORY: No cough, wheezing, hemoptysis; No shortness of breath  CARDIOVASCULAR: No chest pain or palpitations  GASTROINTESTINAL: No abdominal or epigastric pain. No nausea, vomiting, or hematemesis; No diarrhea or constipation. No melena or hematochezia.  GENITOURINARY: No dysuria, frequency or hematuria  NEUROLOGICAL: No numbness or weakness  SKIN: No itching, burning, rashes, or lesions   All other review of systems is negative unless indicated above.    VITAL:  T(C): , Max: 37.2 (08-12-22 @ 00:26)  T(F): , Max: 98.9 (08-12-22 @ 00:26)  HR: 104 (08-12-22 @ 05:19)  BP: 117/69 (08-12-22 @ 05:19)  BP(mean): --  RR: 18 (08-12-22 @ 05:19)  SpO2: 97% (08-12-22 @ 05:19)  Wt(kg): --    I and O's:    08-10 @ 07:01  -  08-11 @ 07:00  --------------------------------------------------------  IN: 720 mL / OUT: 1201 mL / NET: -481 mL    08-11 @ 07:01  -  08-12 @ 07:00  --------------------------------------------------------  IN: 640 mL / OUT: 1150 mL / NET: -510 mL          PHYSICAL EXAM:    Constitutional: NAD  HEENT: PERRLA,   Neck: No JVD  Respiratory: CTA B/L  Cardiovascular: S1 and S2  Gastrointestinal: BS+, soft, NT/ND  Extremities: No peripheral edema  Neurological: A/O x 3, no focal deficits  Psychiatric: Normal mood, normal affect  : No Gimenez  Skin: No rashes  Access: Not applicable  Back: No CVA tenderness    LABS:    08-10    133<L>  |  94<L>  |  11  ----------------------------<  115<H>  4.1   |  31  |  0.32<L>    Ca    8.2<L>      10 Aug 2022 07:09            RADIOLOGY & ADDITIONAL STUDIES:

## 2022-08-12 NOTE — PROVIDER CONTACT NOTE (OTHER) - NAME OF MD/NP/PA/DO NOTIFIED:
JAY Bentley
MD Amaya
MD Helen
Md Combs
Ortho PA/Ross
Ortho PA/Ross
JAY Conde
MD Rohan Cervantes
MD Helen
Ortho Resident/Travis
MD Perkins
Trever Alanis

## 2022-08-12 NOTE — PROVIDER CONTACT NOTE (OTHER) - REASON
Pt w/ elevated HR.
Pt. failed Trial or Void
Pt due for Lovenox.
Pt still has not voided
Pt w/ elevated HR.
Urinary Retention
R sided 4/10 inspiratory chest pain
Urinary Retention
Coffee ground emesis
Pt. failed trial of Void
pt unable to void
Pt failed trial of Void, Bladder Scan showed 271

## 2022-08-12 NOTE — PROGRESS NOTE ADULT - PROBLEM SELECTOR PROBLEM 1
Hip fracture, right
Pain
Hip fracture, right
Pain
Hip fracture, right
Pain
Hip fracture, right

## 2022-08-12 NOTE — CHART NOTE - NSCHARTNOTEFT_GEN_A_CORE
Pt using prn medications with good results.  plan for d/c to rehab.  Palliative care will sign off as pain is controlled.

## 2022-08-12 NOTE — PROGRESS NOTE ADULT - PROBLEM SELECTOR PROBLEM 4
Brain metastasis
Debility
Brain metastasis
Debility
Brain metastasis
Constipation
Encounter for palliative care
Debility
Brain metastasis
Brain metastasis
Debility
Brain metastasis
Brain metastasis
Debility
Brain metastasis

## 2022-08-12 NOTE — PROGRESS NOTE ADULT - NUTRITIONAL ASSESSMENT
This patient has been assessed with a concern for Malnutrition and has been determined to have a diagnosis/diagnoses of Moderate protein-calorie malnutrition.    This patient is being managed with:   Diet Regular-  Entered: Aug  3 2022  7:29PM    

## 2022-08-12 NOTE — PROGRESS NOTE ADULT - REASON FOR ADMISSION
Excision of R pelvic mass, RTHA
Right Acetabular lesion
abd pain
abl ct
abl imaging
abnormal ct
dconstipaotion
shasha
R Acetabular lesion
Right acetabular lesion
Right acetabular mass
abd pain
abd pain, n/v
abl ct
abnl ct
abd distention
abd pain
abl ct
n/v
R hip
R hip fracture
hip fracture
hip fx

## 2022-08-12 NOTE — PROVIDER CONTACT NOTE (OTHER) - SITUATION
Urinary Retention
Pt. DTV in am, Failed trial of Void several times during day.
R sided 4/10 inspiratory chest pain
Pt DTV around 1PM, attempted multiple times to void on commode, unsuccesful, Ortho PA/Bryant/Nilo made aware
Pt was DTV @1900, still unable to void
Pt w/ elevated HR.
Pt w/ elevated HR.
Pt with coffee ground emesis x1.
Pt. DTV in am, requested more time, placed on commode
Urinary Retention
Pt due for Lovenox.
Pt unable to void

## 2022-08-12 NOTE — DISCHARGE NOTE NURSING/CASE MANAGEMENT/SOCIAL WORK - PATIENT PORTAL LINK FT
You can access the FollowMyHealth Patient Portal offered by [facility] by registering at the following website: [email or web address]. By joining [facility]’s FollowMyHealth portal, you will also be able to view your health information using other applications (apps) compatible with our system.

## 2022-08-12 NOTE — PROGRESS NOTE ADULT - PROBLEM SELECTOR PROBLEM 3
Lung cancer
Lung cancer
Hip fracture, right
Lung cancer
Hip fracture, right
Lung cancer
Lung cancer
Hip fracture, right
Lung cancer
Hip fracture, right
Lung cancer
Hip fracture, right
Lung cancer

## 2022-08-12 NOTE — PROVIDER CONTACT NOTE (OTHER) - ASSESSMENT
A&OX4. VSS. Pt resting in bed comfortably. Pt denies urge/pressure to void. Pts abdomen distended. Bladder scanner showed 620cc urine
A&Ox4, resting in bed, Pt DTV~@0630, bladder scan shows 810cc, bladder slightly distended upon palpation, pt olmos catheter removed ~@2230 last night. Pt wants to try morning routine of coffee and to try to void on commode before straight cath.
Pt with coffee ground emesis x1. Abd distended. Pt nauseous. Pt hasn't eaten anything today.
Pt. abdomen distended, firm, pt. stated she felt bloated
Pt is in 10/10 pain. Her VS displayed an elevated HR of 115, /86. RN brought this concern to MD Amaya.
Abdomen slightly distended, no c/o discomfort at this time
Pt. abdomen distended, uncomfortable
A&OX4. VSS. Gimenez removed earlier; pt dtv@1900. Bladder scanner showed 240cc. Pt encouraged to drink fluids. Pt states that she has no urge/pressure to void
AOx4. Vital signs wdl. New c/o R sided cp.
Pt is due for lovenox injection now. When RN went to tell the pt she stated, "I just took that at 10am at the other hosptial." RN brought these concerns to MD Helen.
A&Ox4, resting in bed, Pt DTV, bladder scan shows 443cc, bladder slightly distended upon palpation, pt straight cath x2 prior.
Pt is in 10/10 pain upon admission to [address] [place] [address]. Admission VS are noted to be /87, . The pt stated, "the other hospital was so mean. They sent me off in that ambulance w/out any pain medication. I am in so much pain you have no idea." RN brought this concern to MD Helen and to get orders for pain meds.

## 2022-08-12 NOTE — PROGRESS NOTE ADULT - PROBLEM SELECTOR PLAN 7
Patient with persistent tachycardia  CTA was negative for PE  LE dopplers are done and results are pending  continue current pain meds. Pain has been better controlled  Follow O2 sats
Patient with persistent tachycardia  with LE edema CTA and LE dopplers where ordered  continue current pain meds. Pain has been better controlled  Follow O2 sats
Patient with persistent tachycardia  CTA was negative for PE  LE dopplers are done and results are pending  continue current pain meds. Pain has been better controlled  Follow O2 sats

## 2022-08-12 NOTE — DISCHARGE NOTE NURSING/CASE MANAGEMENT/SOCIAL WORK - NSDCPEFALRISK_GEN_ALL_CORE
For information on Fall & Injury Prevention, visit: [email or web address] OR  [email or web address] OR  https://www.cdc.gov/steadi/patient.html

## 2022-08-12 NOTE — PROGRESS NOTE ADULT - PROBLEM SELECTOR PROBLEM 2
Pain
Constipation
Hip fracture, right
Hip fracture, right
Constipation
Constipation
Pain
Constipation
Pain
Pain
Constipation
Pain
Pain

## 2022-08-12 NOTE — PROGRESS NOTE ADULT - SUBJECTIVE AND OBJECTIVE BOX
Patient is a 62y old  Female who presents with a chief complaint of Right acetabular mass  Patient s/p right hip mass excision, complex total hip arthroplasty POD#9  Patient comfortable  No complaints    T(C): 36.8 (08-12-22 @ 05:19), Max: 37.2 (08-12-22 @ 00:26)  HR: 104 (08-12-22 @ 05:19) (100 - 109)  BP: 117/69 (08-12-22 @ 05:19) (107/73 - 123/70)  RR: 18 (08-12-22 @ 05:19) (18 - 18)  SpO2: 97% (08-12-22 @ 05:19) (94% - 97%)    PHYSICAL EXAM:  NAD, Alert  [Right ] Hip: Dressing C/D/I; sensation grossly intact to light touch; (+) DF/PF; (+) Distal Pulses; No Calf tenderness B/L, PAS     LABS:                      8.3    22.22 )-----------( 596      ( 10 Aug 2022 06:50 )             25.9   08-10  133<L>  |  94<L>  |  11  ----------------------------<  115<H>  4.1   |  31  |  0.32<L>  Ca    8.2<L>      10 Aug 2022 07:09

## 2022-08-12 NOTE — PROGRESS NOTE ADULT - PROBLEM SELECTOR PLAN 3
Patient has been following up with a oncologist in [place]  States she recently had a bone biopsy but doesn't have the results  will need to eventually start chemo +/- RT.

## 2022-08-12 NOTE — PROGRESS NOTE ADULT - PROVIDER SPECIALTY LIST ADULT
Anesthesia
Gastroenterology
Internal Medicine
Orthopedics
Palliative Care
Rehab Medicine
Gastroenterology
Internal Medicine
Orthopedics
Orthopedics
Palliative Care
Pulmonology
Gastroenterology
Heme/Onc
Orthopedics
Orthopedics
Pulmonology
Gastroenterology
Gastroenterology
Orthopedics
Gastroenterology
Orthopedics
Palliative Care
Internal Medicine
Palliative Care
Internal Medicine
Palliative Care
Internal Medicine

## 2022-08-12 NOTE — PROGRESS NOTE ADULT - SUBJECTIVE AND OBJECTIVE BOX
[facility] - PROGRESS NOTE    CHIEF COMPLAINT: lung cancer; malignant pleural effusion; atelectasis; COPD; emphysema; pleurisy; pathologic hip fracture    INTERVAL HISTORY: olmos catheter taken out last night -> has yet to void; walked in the [place] with physical therapy yesterday having received analgesics 30 minutes prior to the session; having more regular bowel movements; pain at the surgical site is fairly well controlled on oral analgesics; no shortness of breath or hypoxemia on room air; no cough, sputum production, chest congestion or wheeze; no fevers, chills or sweats; no fatigue, malaise or weakness; no anterior chest pain/pressure or palpitations; right sided chest pain exacerbated by deep inspiration; on cipro for a UTI    REVIEW OF SYSTEMS:  Constitutional: As per interval history  HEENT: Within normal limits  CV: As per interval history  Resp: As per interval history  GI: bowel inertia/constipation due to narcotics  : urinary retention  Musculoskeletal: s/p right acetabular mass excision with a complex right total hip replacement on 8/3  Skin: Within normal limits  Neurological: Within normal limits  Psychiatric: Within normal limits  Endocrine: Within normal limits  Hematologic/Lymphatic: metastatic lung cancer  Allergic/Immunologic: Within normal limits    MEDICATIONS:     Pulmonary "    Anti-microbials:  ciprofloxacin     Tablet 500 milliGRAM(s) Oral every 12 hours    Cardiovascular:    Other:  enoxaparin Injectable 40 milliGRAM(s) SubCutaneous every 24 hours  fentaNYL   Patch 100 MICROgram(s)/Hr 1 Patch Transdermal every 72 hours  multivitamin 1 Tablet(s) Oral daily  naloxegol 25 milliGRAM(s) Oral daily  pantoprazole    Tablet 40 milliGRAM(s) Oral before breakfast  polyethylene glycol 3350 17 Gram(s) Oral two times a day  senna 2 Tablet(s) Oral every 12 hours    MEDICATIONS  (PRN):  aluminum hydroxide/magnesium hydroxide/simethicone Suspension 30 milliLiter(s) Oral four times a day PRN Indigestion  bisacodyl Suppository 10 milliGRAM(s) Rectal daily PRN Constipation  HYDROmorphone   Tablet 8 milliGRAM(s) Oral every 4 hours PRN Moderate Pain (4 - 6)  HYDROmorphone   Tablet 10 milliGRAM(s) Oral every 4 hours PRN Severe Pain (7 - 10)  magnesium hydroxide Suspension 30 milliLiter(s) Oral daily PRN Constipation  naloxone Injectable 0.3 milliGRAM(s) IV Push every 3 minutes PRN obtundation, RR <10  ondansetron Injectable 4 milliGRAM(s) IV Push every 6 hours PRN Nausea and/or Vomiting        OBJECTIVE:    PHYSICAL EXAM:       ICU Vital Signs Last 24 Hrs  T(C): 36.8 (12 Aug 2022 05:19), Max: 37.2 (12 Aug 2022 00:26)  T(F): 98.3 (12 Aug 2022 05:19), Max: 98.9 (12 Aug 2022 00:26)  HR: 104 (12 Aug 2022 05:19) (100 - 109)  BP: 117/69 (12 Aug 2022 05:19) (107/73 - 123/70)  BP(mean): --  ABP: --  ABP(mean): --  RR: 18 (12 Aug 2022 05:19) (18 - 18)  SpO2: 97% (12 Aug 2022 05:) (94% - 97%) on room air     General: Awake. Alert. Cooperative. No distress. Appears stated age 	  HEENT: Atraumatic. Normocephalic. Anicteric. Normal oral mucosa. PERRL. EOMI.  Neck: Supple. Trachea midline. Thyroid without enlargement/tenderness/nodules. No carotid bruit. No JVD.	  Cardiovascular: Tachycardic rate and rhythm. S1 S2 normal. No murmurs, rubs or gallops.  Respiratory: Respirations unlabored. Decreased breath sounds right base with dullness to percussion. No curvature.  Abdomen: Soft. Non-tender. Non-distended. No organomegaly. No masses. Normal bowel sounds. Olmos catheter.  Extremities: Warm to touch. No clubbing or cyanosis. No pedal edema. Abductor pillow in place. Right thigh edema and pain to palpation  Pulses: 2+ peripheral pulses all extremities.	  Skin: Normal skin color. No rashes or lesions. No ecchymoses. No cyanosis. Warm to touch.  Lymph Nodes: Cervical, supraclavicular and axillary nodes normal  Neurological: Motor and sensory examination equal and normal. A and O x 3  Psychiatry: Appropriate mood and affect.      LABS:      CBC    WBC  22.22 <==, 18.59 <==, 16.33 <==, 22.89 <==    Hemoglobin  8.3 <<==, 8.2 <<==, 8.6 <<==, 9.2 <<==    Hematocrit  25.9 <==, 25.9 <==, 26.6 <==, 28.9 <==    Platelets  596 <==, 592 <==, 501 <==, 430 <==      133<L>  |  94<L>  |  11  ----------------------------<  115<H>    08-10  4.1   |  31  |  0.32<L>      LYTES    sodium  133 <==, 135 <==, 133 <==, 133 <==    potassium   4.1 <==, 4.1 <==, 4.1 <==, 3.7 <==    chloride  94 <==, 97 <==, 96 <==, 92 <==    carbon dioxide  31 <==, 31 <==, 29 <==, 26 <==    =============================================================================================  RENAL FUNCTION:    Creatinine:   0.32  <<==, 0.37  <<==, 0.37  <<==, <0.30  <<==    BUN:   11 <==, 9 <==, 10 <==, 11 <==    ============================================================================================    calcium   8.2 <==, 8.0 <==, 8.0 <==, 8.0 <==    ===========================================================================================  MICROBIOLOGY:     COVID-19 PCR . (22 @ 21:22)   COVID-19 PCR: NotDetec:      Urinalysis Basic - ( 08 Aug 2022 09:25 )    Color: Light Yellow / Appearance: Clear / S.007 / pH: x  Gluc: x / Ketone: Negative  / Bili: Negative / Urobili: Negative   Blood: x / Protein: Negative / Nitrite: Negative   Leuk Esterase: Large / RBC: 4 /hpf / WBC 60 /HPF   Sq Epi: x / Non Sq Epi: 0 /hpf / Bacteria: Many    Culture - Urine (22 @ 11:28)   Specimen Source: Catheterized Catheterized   Culture Results:   >100,000 CFU/ml Enterobacter cloacae complex     RADIOLOGY:  [x ] Chest radiographs reviewed and interpreted by me    EXAM:  XR CHEST PORTABLE URGENT 1V                          PROCEDURE DATE:  2022      FINDINGS:  The heart is normal in size.  Small right pleural effusion with associated atelectasis.  No pneumothorax.    IMPRESSION:  Small right pleural effusion with associated atelectasis.    ROD MARTINEZ MD; Resident Radiologist  This document has been electronically signed.  PUMA KAM MD; Attending Interventional Radiologist  This document has been electronically signed. Aug  8 2022 10:17AM  ---------------------------------------------------------------------------------------------------------------  EXAM:  CT ANGIO CHEST PULM ART WAWIC                          PROCEDURE DATE:  2022      FINDINGS:    PULMONARY ANGIOGRAM:  No pulmonary embolism.    LUNGS/AIRWAYS/PLEURA: No endobronchial lesion. Several bilateral lung   nodules for example, largest 1.8 cm in the right lower lobe (5-65).   Peripheral and peribronchovascular groundglass in the upper lobes. Mild   emphysema. Right lower lobe passive atelectasis. Small right pleural   effusion in association with thickening of the parietal pleura (for   example, 5-106).    LYMPH NODES/MEDIASTINUM: Right hilar and perihilar lymph nodes up to 1.2   cm. Fluid within the esophagus up to the aortic arch. Mild esophageal   wall thickening.    HEART/VASCULATURE: Normal heart size. Unremarkable pericardium. Normal   caliber aorta.    UPPER ABDOMEN: Stable hepatic cyst and bilateral adrenal thickening.   Cholecystectomy.    BONES/SOFT TISSUES: Multiple bone lesions, for example, the L1 vertebral   body and the left ninth rib. Asymmetric nodular density in the upper   outer left breast.      IMPRESSION:    No pulmonary embolism.    Several bilateral lung nodules, compatible with metastases. Concurrent   mild groundglass in the upper lobes, likely infectious or inflammatory.    Small right pleural effusion associated with right pleural thickening,   raising suspicion for a malignant pleural effusion.    Right hilar/perihilar lymphadenopathy.    Multiple bone metastases.    Asymmetrically dense upper outer left breast, not well characterized with   CT.    ANNIE SHEN M.D., ATTENDING RADIOGIST  This document has been electronically signed. Aug  9 2022  8:32AM    ---------------------------------------------------------------------------------------------------------------  EXAM:  DUPLEX SCAN EXT VEINS LOWER BI                          PROCEDURE DATE:  2022      IMPRESSION:  No evidence of deep venous thrombosis in either lower extremity.    FLACA MCCLELLAND MD; Attending Radiologist  This document has been electronically signed. Aug  9 2022  1:42PM  ---------------------------------------------------------------------------------------------------------------  EXAM:  CT ABDOMEN AND PELVIS                          PROCEDURE DATE:  2022      IMPRESSION:  Large soft tissue mass with gross destruction of the right acetabulum,   and inferior and superior pubic rami highly concerning for malignancy.   The soft tissue mass is inseparable from a small portion the right side   of the bladder and invasion cannot be excluded.. Lytic lesion in L1   vertebral body. Multiple bilateral pleural nodules highly concerning for   metastatic disease. Moderate right pleural effusion with mild posterior   pleural thickening.. Pleural-based metastases cannot be excluded. Right   external iliac lymph node raises concern for metastatic disease.    Gross distention of the ascending colon. There is a large amount of stool   and this may be due to fecal impaction. Patrick syndrome would be   considered if fecal impaction is relieved and dilatation persisted.   Please correlate clinically.    Mild bilateral hydronephrosis which may be due to the very distended   bladder.    ANNIE MENJIVAR MD; Attending Radiologist  This document has been electronically signed. 2022  5:12PM  ---------------------------------------------------------------------------------------------------------------  EXAM:  NM BONE SPECT CT  SD                        EXAM:  NM BONE IMG WHOLE BODY                          PROCEDURE DATE:  2022      IMPRESSION: Abnormal bone scan.    Mildly heterogeneous tracer activity in the right anterior acetabulum   corresponding to sclerotic density on CT. Destructive lesion in the right   posterior acetabulum and right ischium with difficult to delineate soft   tissue component on CT are not visualized on the bone scan.    Mildly heterogeneous tracer activity in the right proximal femur with no   definite abnormality on CT.    GLENDA RUIZ MD; Attending Nuclear Medicine  This document has been electronically signed. Aug  1 2022  5:40PM  ---------------------------------------------------------------------------------------------------------------

## 2022-08-12 NOTE — PROGRESS NOTE ADULT - ASSESSMENT
61 y/o FM s/p right hip mass excision, complex total hip arthroplasty POD#9, TOMAUREEN, STACIA Hurtado PA-C  Orthopaedic Surgery  Team pager [phone]  [facility] [phone]  [phone]

## 2022-08-12 NOTE — PROGRESS NOTE ADULT - PROBLEM SELECTOR PLAN 4
Plan is for acute rehab
continue to monitor for seizure activity  Patient has been seizure free without any surgical intervention so no seizure meds are needed at this time.
Plan is for acute rehab
Miralax 17 gm po qd  senna 2 tabs po  qhs  mag hydroxide
Plan is for acute rehab
Will continue to follow for symptom management.    For acute issues or uncontrolled symptoms please page palliative team.    Blanka Ge MD  Geriatrics and Palliative Medicine Attending  [facility] pager: [phone]     The Geriatrics and Palliative Medicine consult service has 24/7 coverage for medical recommendations, including symptom management needs.
continue to monitor for seizure activity  Patient has been seizure free without any surgical intervention so no seizure meds are needed at this time.
Plan is for acute rehab
continue to monitor for seizure activity  Patient has been seizure free without any surgical intervention so no seizure meds are needed at this time.
continue to monitor for seizure activity  Patient has been seizure free without any surgical intervention so no seizure meds are needed at this time.
Plan is for acute rehab
continue to monitor for seizure activity  Patient has been seizure free without any surgical intervention so no seizure meds are needed at this time.

## 2022-08-12 NOTE — PROGRESS NOTE ADULT - TIME BILLING
Discussed treatment plan with patient and  at bedside.   I am a non participating [employer] physician seeing Pt in coverage for Dr Monsalve. The above patient documentation by Dr. Wesley was reviewed and I agree with his evaluation, assessment and treatment plan.  Rohan Monsalve M.D.
Discussed treatment plan with patient and  at bedside.   I am a non participating [employer] physician seeing Pt in coverage for Dr Monsalve. The above patient documentation by Dr. Wesley was reviewed and I agree with his evaluation, assessment and treatment plan.  Rohan Monsalve M.D.
Discussed treatment plan with patient and  at bedside.   DC planning to rehab  I am a non participating [employer] physician seeing Pt in coverage for Dr Monsalve. The above patient documentation by Dr. Wesley was reviewed and I agree with his evaluation, assessment and treatment plan.  Rohan Monsalve M.D.
Discussed treatment plan with patient and  at bedside.   DC planning to rehab  I am a non participating [employer] physician seeing Pt in coverage for Dr Monsalve. The above patient documentation by Dr. Wesley was reviewed and I agree with his evaluation, assessment and treatment plan.  Rohan Monsalve M.D.
Discussed treatment plan with patient at bedside.   I am a non participating [employer] physician seeing Pt in coverage for Dr Monsalve. The above patient documentation by Dr. Wesley was reviewed and I agree with his evaluation, assessment and treatment plan.  Rohan Monsalve M.D.
Discussed treatment plan with patient and  at bedside.   I am a non participating [employer] physician seeing Pt in coverage for Dr Monsalve. The above patient documentation by Dr. Wesley was reviewed and I agree with his evaluation, assessment and treatment plan.  Rohan Monsalve M.D.
Patient DCed to rehab  continue current meds  PO as tolerated  activity as tolerated  follow up with ortho and oncology  Patient aware of plan   I am a non participating [employer] physician seeing Pt in coverage for Dr Monsalve. The above patient documentation by Dr. Wesley was reviewed and I agree with his evaluation, assessment and treatment plan.  Rohan Monsalve M.D.
Discussed treatment plan with patient at bedside.   I am a non participating [employer] physician seeing Pt in coverage for Dr Monsalve. The above patient documentation by Dr. Wesley was reviewed and I agree with his evaluation, assessment and treatment plan.  Rohan Monsalve M.D.
Discussed treatment plan with patient at bedside.   I am a non participating [employer] physician seeing Pt in coverage for Dr Monsalve. The above patient documentation by Dr. Wesley was reviewed and I agree with his evaluation, assessment and treatment plan.  Rohan Monsalve M.D.
Discussed treatment plan with patient and  at bedside.   I am a non participating [employer] physician seeing Pt in coverage for Dr Monsalve. The above patient documentation by Dr. Wesley was reviewed and I agree with his evaluation, assessment and treatment plan.  Rohan Monsalve M.D.

## 2022-08-12 NOTE — PROVIDER CONTACT NOTE (OTHER) - BACKGROUND
Pt admitted for Pathological fx od acetabular.
Pt s/p Right acetabular mass excision, right complex total hip replacement
Multiple tumors. Lung ca with mass on pelvis.
Pt s/p Right acetabular mass excision, right complex total hip replacement; olmos d/c, straight cath x1 performed already
See H & P
Pt admitted for pathological fx of the acetabular.
Pt s/p Right acetabular mass excision, right complex total hip replacement; olmos d/c, straight cath x1 performed already
Pt admitted for pathological fx of acetabular.
Pt s/p Right acetabular mass excision, right complex total hip replacement; olmos d/c, straight cath x1 performed already
See H & P
Hx of lung CA with mets to the brain. S/P RTHA
See H & P

## 2022-08-12 NOTE — PROGRESS NOTE ADULT - SUBJECTIVE AND OBJECTIVE BOX
62y Female presents as direct transfer from [facility], c/o R hip pain that begain in May, then this past weekend was unable to bear weight and went to nearby hospital, where she was found to have R acetabular lesion, lung lesions and brain lesions per pt. R hip lesion was biopsied at prior hospital and consistent with metastatic lung cancer. Patient denies numbness. Denies trauma. Patient denies headstrike or LOC. Patient denies radiation of pain. Patient denies numbness/tingling/burning in the RLE. No other bone/joint complaints. Patient is a community ambulator at baseline without assistive devices. Patient seen now resting with continue complaint of pain in the right leg. Patient s/p CT scan which shows stool in the colon.  Pain seems better controlled. Patient states pain is better controlled today.       MEDICATIONS  (STANDING):  ciprofloxacin     Tablet 500 milliGRAM(s) Oral every 12 hours  enoxaparin Injectable 40 milliGRAM(s) SubCutaneous every 24 hours  fentaNYL   Patch 100 MICROgram(s)/Hr 1 Patch Transdermal every 72 hours  multivitamin 1 Tablet(s) Oral daily  naloxegol 25 milliGRAM(s) Oral daily  pantoprazole    Tablet 40 milliGRAM(s) Oral before breakfast  polyethylene glycol 3350 17 Gram(s) Oral two times a day  senna 2 Tablet(s) Oral every 12 hours    MEDICATIONS  (PRN):  aluminum hydroxide/magnesium hydroxide/simethicone Suspension 30 milliLiter(s) Oral four times a day PRN Indigestion  bisacodyl Suppository 10 milliGRAM(s) Rectal daily PRN Constipation  HYDROmorphone   Tablet 8 milliGRAM(s) Oral every 4 hours PRN Moderate Pain (4 - 6)  HYDROmorphone   Tablet 10 milliGRAM(s) Oral every 4 hours PRN Severe Pain (7 - 10)  magnesium hydroxide Suspension 30 milliLiter(s) Oral daily PRN Constipation  naloxone Injectable 0.3 milliGRAM(s) IV Push every 3 minutes PRN obtundation, RR <10  ondansetron Injectable 4 milliGRAM(s) IV Push every 6 hours PRN Nausea and/or Vomiting          VITALS:   T(C): 36.7 (08-11-22 @ 12:06), Max: 36.8 (08-10-22 @ 16:59)  HR: 101 (08-11-22 @ 12:06) (100 - 110)  BP: 113/83 (08-11-22 @ 12:06) (105/79 - 116/71)  RR: 18 (08-11-22 @ 12:06) (18 - 18)  SpO2: 96% (08-11-22 @ 12:06) (94% - 98%)  Wt(kg): --    PHYSICAL EXAM:  GENERAL: NAD, well-groomed, well-developed  HEAD:  Atraumatic, Normocephalic  EYES: EOMI, PERRLA, conjunctiva and sclera clear  ENMT: No tonsillar erythema, exudates, or enlargement; Moist mucous membranes, Good dentition, No lesions  NECK: Supple, No JVD, Normal thyroid  NERVOUS SYSTEM:  Alert & Oriented X3, Good concentration;   CHEST/LUNG: Clear to percussion bilaterally; No rales, rhonchi, wheezing, or rubs  HEART: Regular rate and rhythm; No murmurs, rubs, or gallops  ABDOMEN: Soft, Nontender, Nondistended; Bowel sounds present  EXTREMITIES:  2+ Peripheral Pulses, No clubbing, cyanosis, or edema  LYMPH: No lymphadenopathy noted  SKIN: No rashes or lesions    LABS:        CBC Full  -  ( 10 Aug 2022 06:50 )  WBC Count : 22.22 K/uL  RBC Count : 2.79 M/uL  Hemoglobin : 8.3 g/dL  Hematocrit : 25.9 %  Platelet Count - Automated : 596 K/uL  Mean Cell Volume : 92.8 fl  Mean Cell Hemoglobin : 29.7 pg  Mean Cell Hemoglobin Concentration : 32.0 gm/dL  Auto Neutrophil # : x  Auto Lymphocyte # : x  Auto Monocyte # : x  Auto Eosinophil # : x  Auto Basophil # : x  Auto Neutrophil % : x  Auto Lymphocyte % : x  Auto Monocyte % : x  Auto Eosinophil % : x  Auto Basophil % : x    08-10    133<L>  |  94<L>  |  11  ----------------------------<  115<H>  4.1   |  31  |  0.32<L>    Ca    8.2<L>      10 Aug 2022 07:09            CAPILLARY BLOOD GLUCOSE          RADIOLOGY & ADDITIONAL TESTS:

## 2022-08-12 NOTE — PROVIDER CONTACT NOTE (OTHER) - RECOMMENDATIONS
Pt. advised that she needed to be straight as per Ortho team
Notify provider.
MD Cervantes made aware, olmos catheter?
MD Helen made aware.
Notify MD Ng; bolus pt
As per Ortho PA/Ross/Give Patient more time
MD Amaya made aware.
MD Helen made aware.
Notify MD Combs; straight cath
JAY Conde made aware

## 2022-08-12 NOTE — PROVIDER CONTACT NOTE (OTHER) - ACTION/TREATMENT ORDERED:
As per MD Helen, will give lovenox injection at 10AM. No further interventions ordered at this time. Will continue to monitor.
Pt. Bladder Scanned - 271/Ortho made aware/Monitor/PP/RN
As per MD Helen, will come see pt at the bedside. No further interventions ordered at this time. Will continue to monitor.
Gimenez catheter ordered and to be placed. To monitor urine output.
MD Perkins notified. No interventions needed at this time, encourage pt to void on her own
Provider notified. Recommends CXR & EKG.
As per MD Amaya, will continue to monitor after pt receives pain medication. No further interventions ordered at this time. Will continue to monitor.
MD Combs aware; ordered straight cathx1
Orders to be placed. Cont to monitor.
Pt. Bladder scanned - 593, PA Bryant aware, Pt. requested more time, did not want to be straight cathed/Monitor/PP/RN
Pt to try to void on commode and if no void, notify provider for straight catheter. Safety maintained.
As per Ortho, Pt. Straight Cathed - Residual - 800/Monitor/PP/RN

## 2022-08-12 NOTE — PROGRESS NOTE ADULT - ASSESSMENT
ASSESSMENT:    62 year old gentlewoman, current smoker, recently diagnosed with metastatic lung cancer on a biopsy of a right pelvic lesion at [facility] with evidence of disease in the brain and bone. PET/CT -> 2cm FDG avid superior segment of the right lower lobe nodule associated with hypermetabolic right hilar adenopathy - trace right pleural effusion with metastatic disease to the right pleura - 7cm destructive osseous lesion involving the right acetabulum - hypermetabolic lytic lesion in the L1 vertebral body. Brain MRI -> 3 subcentimeter metastases the largest being 8mm in the left frontal lobe - no vasogenic edema. The patient was started on radiation therapy which was stopped after only two sessions due to the patient's inability to bear weight related due to intractable pain. She was transferred to [place] for further treatment and evaluation. Imaging revealed a large soft tissue mass with gross destruction of the right acetabulum and the inferior and superior pubic rami highly concerning for malignancy - the soft tissue mass is inseparable from a small portion the right side of the bladder and invasion cannot be excluded - lytic lesion in L1 vertebral body. The patient underwent right acetabular mass excision with a complex right total hip replacement on 8/3. 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 She had a bowel movement yesterday. She is receiving nutritional support for protein calorie malnutrition. The pain in her right leg is somewhat improved and she walked in the hallway with physical therapy yesterday. Urinary retention has required placement of a olmos catheter. She has persistent tachycardia without shortness of breath or hypoxemia on room air. She has no cough, sputum production, chest congestion or wheeze. No fevers, chills or sweats. No fatigue, malaise or weakness. She has no anterior chest pain/pressure or palpitations. She has right sided chest pain exacerbated by deep inspiration.       the patient has advanced non-small cell lung cancer - the primary lesion is in the right lower lobe - there is evidence of intrathoracic spread of disease to the right hilar nodes and right pleura with a malignant right pleural effusion - the peripheral and peribronchovascular groundglass opacities in the upper lobes likely reflect smoking related bronchiolitis - there are 3 subcentimeter lesions in the brain - her presenting symptoms were due to a 7cm destructive osseous lesion involving the right acetabulum with a hypermetabolic lytic lesion in the L1 vertebral body.    the patient underwent right acetabular mass excision with a complex right total hip replacement on 8/3 - post operative course has been notable for:  1) severe pain that is now improving  2) colonic inertia due to high dose narcotics -> resolved  3) urinary retention due to immobility  4) tachycardia related to pain, anxiety and hypovolemia    PLAN/RECOMMENDATIONS:    stable oxygenation on room air  observe off pulmonary medications and antibiotics  use of the incentive spirometer at bedside encouraged  conservative management of the malignant pleural effusion and bronchiolitis unless the patient develops respiratory symptoms  would check to make sure that the patient does not need steroids and/or prophylactic anticonvulsants for her CNS lesions  follow-[facility] radiation oncology and [facility] [place] [facility]  trial of void is ongiong  analgesics decreasing narcotics as able - on fentanyl patch/po dilaudid  GI/DVT prophylaxis - protonix/SQ lovenox  bowel regimen - naloxegol/miralax/senna  on po cipro for an Enterobacter cloacae complex UTI    Will follow with you. Plan of care discussed with the patient at bedside and with the orthopedic team. Hopefuls discharge to [place] [facility] today. The olmos catheter may need to be replaced until she is more active.     Arben Jackson MD, [facility]  [phone]  Pulmonary Medicine

## 2022-09-01 NOTE — HISTORY OF PRESENT ILLNESS
[FreeTextEntry1] : Ms Sanders is a 62 year old female with stage IV metastatic disease. Referred by DR Melendez for palliative radiation to the right acetabulum\par \par Patient developed new right pelvic pain and difficulty walking with right leg weakness due to pain. \par \par She was evaluated @ [facility] ER on 6/26/2022\par \par CT L spine showed 1.5 cm lytic lesion of the right lateral L1 vertebral body cortex with associated cortical breakthrough and small soft tissue component.\par \par Chest X Ray showed a spiculated mass at the Right midlung level measuring 1.6 cm\par \par CT pelvis showed 7.7 x 6.9 x 4.2 cm destructive lesion with soft tissue mass involving the medial column of the right acetabulum and extending into the adjacent ischium. \par \par 7/6/2022 PET/CT IMPRESSION\par 2 cm hypermetabolic nodule in the superior segment of the RLL suspicious for malignant process. Associated hypermetabolic right hilar lymphadenopathy and subcarinal lymphadenopathy is suspicious for heydi metastatic disease. Trace right pleural effusion with pleural uptake. Malignant pleural effusion is not excluded. Hypermetabolic osseous metastasis including 1.6 cm L1 Lytic lesion and 7.1 cm destructive osseous lesion involving the right acetabulum \par \par Brain MRI   Done Saturday July 22nd revealed :\par Multifocal intraparenchymal metastasis largest lesion in the left frontal lobe measures .8 CM\par \par 9/7/2022\par Today the patient is now s/p RT to the right hip 2 FXs .  RT was stopped DT a fracture in the hip and patient was taken to surgery.  She will resume XRT after healing.  \par \par

## 2022-09-12 NOTE — VITALS
[Maximal Pain Intensity: 6/10] : 6/10 [70: Cares for self; unalbe to carry on normal activity or do active work.] : 70: Cares for self; unable to carry on normal activity or do active work.

## 2022-09-12 NOTE — HISTORY OF PRESENT ILLNESS
[FreeTextEntry1] : Pt is inpatient on 6th floor.  She was very lethargic yesterday so the floor has held her ativan.  She appears comfortable but does state she has pain at a 6 today.  She is eating small amounts.

## 2022-09-12 NOTE — PHYSICAL EXAM
[Thin] : thin [] : no respiratory distress [Normal] : no focal deficits [de-identified] : Pale/ashen appearing  [de-identified] : Patient is on supplemental O2 but it not short of breath or dyspneic  [de-identified] : Tearful

## 2022-09-12 NOTE — DISEASE MANAGEMENT
[Clinical] : TNM Stage: c [IVB] : IVB [TTNM] : 1 [NTNM] : 2 [MTNM] : 1c [de-identified] : FX 2 of 5 to bone met 800 cGY out of 2000cGy

## 2022-09-29 ENCOUNTER — APPOINTMENT (OUTPATIENT)
Dept: THORACIC SURGERY | Facility: CLINIC | Age: 62
End: 2022-09-29

## 2022-10-20 NOTE — PHYSICAL THERAPY INITIAL EVALUATION ADULT - LIVES WITH, PROFILE
condo with 16 steps to enter +rail/spouse
condo with 16 steps to enter +rail/spouse
Split-Thickness Skin Graft Text: Because of the size and thickness of the defect, and to provide coverage and facilitate healing with minimal contraction, a split-thickness skin graft was planned.  The donor site was marked and the graft harvested by scalpel, Weck blade, or dermatome.  The graft was then trimmed to fit the defect.  It was sutured into place and a bolster dressing applied.

## 2022-10-27 NOTE — PROGRESS NOTE ADULT - PROBLEM SELECTOR PLAN 1
Patient tolerated the procedure well  Pain meds as needed  PO as tolerated  DVT prophylaxis numerical 0-10

## 2022-12-26 NOTE — DIETITIAN INITIAL EVALUATION ADULT - NSFNSGIIOFT_GEN_A_CORE
show - Pt denies nausea, vomiting, diarrhea. pt report had constipation but now improved  - Last BM: 2 days ago per pt; currently ordered for bowel regimen (senna, miralax)

## 2023-01-24 NOTE — CONSULT NOTE ADULT - PROBLEM SELECTOR RECOMMENDATION 2
fentalyl 25mcg patch in place. (50 mcg patch ordered)    pt requiring dilaudid 1mg ivp x2 in past 24 hrs.  dilaudid prns available if pt requires
- recently diagnosed non small cell lung cancer as per the outpatient records  - patient follows up with Dr. Nancie Mora  - Bx from the right hip lesion outside consistent with metastatic lung cancer  - ECOG performance status 1 as baseline  - Patient received RT to the hip x 2 and was referred for ortho eval as pain got worse due to pathological fracture ( although it was planned for 5 sessions initially)  - Plan for OR on 8/3  - Patient might need rehab after surgery then continue RT and chemo as outpatient with her rad onc and primary oncologist.
continue fentanyl patch Q72h  Dilaudid q6h for breakthrough  May need to increase the frequecy  consider a pain management eval
none

## 2025-04-21 NOTE — PHYSICAL THERAPY INITIAL EVALUATION ADULT - DIAGNOSIS, PT EVAL
Pt presents with pain, decreased strength, decreased balance, and decreased endurance limiting overall independence with mobility.
<<----- Click to Select Surgeon
Aileen Nunez (Attending)
Pt presents with pain, decreased strength, decreased balance, and decreased endurance limiting overall independence with mobility.

## 2025-06-09 NOTE — DISCHARGE NOTE PROVIDER - NSDCFUSCHEDAPPT_GEN_ALL_CORE_FT
Xiomara Mills  [facility]  [facility] [address] [facility]  Scheduled Appointment: 09/07/2022     1-2 cups/cans per day

## (undated) DEVICE — DRAPE TOWEL BLUE 17" X 24"

## (undated) DEVICE — Device

## (undated) DEVICE — STRYKER BONE MILL BLADE FINE 3.2MM

## (undated) DEVICE — HOOD FLYTE STRYKER HELMET SHIELD

## (undated) DEVICE — LAP PAD 18 X 18"

## (undated) DEVICE — PACK TOTAL HIP (2 PACKS)

## (undated) DEVICE — TUBING SUCTION 20FT

## (undated) DEVICE — SUCTION YANKAUER NO CONTROL VENT

## (undated) DEVICE — NDL HYPO SAFE 22G X 1.5" (BLACK)

## (undated) DEVICE — SAW BLADE MICROAIRE RECIPROCATING 12.2MMX80MMX1.78MM

## (undated) DEVICE — MIDAS REX LEGEND METAL CUTTER DIAMOND CARBIDE 25.4MM X 0.8MM

## (undated) DEVICE — GOWN TRIMAX LG

## (undated) DEVICE — TUBING ULTRA DRIVE 3 IRRIGATION

## (undated) DEVICE — MEDICATION LABELS W MARKER

## (undated) DEVICE — GLV 8 PROTEXIS (WHITE)

## (undated) DEVICE — GLV 7.5 PROTEXIS (WHITE)

## (undated) DEVICE — VENODYNE/SCD SLEEVE CALF LARGE

## (undated) DEVICE — POSITIONER CLIP ON PAD FOR UNIVERSAL LATERAL

## (undated) DEVICE — SYNTHES REAMING ROD WITH BALL TIP 2.5MM 950MM

## (undated) DEVICE — DRSG TAPE MICROFOAM 3"

## (undated) DEVICE — SUT POLYSORB 2-0 30" GS-21 UNDYED

## (undated) DEVICE — STRYKER MIXEVAC 3 BONE CEMENT MIXER

## (undated) DEVICE — DRAPE MAYO STAND 30"

## (undated) DEVICE — DRAPE IOBAN 33" X 23"

## (undated) DEVICE — DRAPE INSTRUMENT POUCH 6.75" X 11"

## (undated) DEVICE — DRSG PREVENA PEEL & PLACE KIT 20CM

## (undated) DEVICE — DRAIN JACKSON PRATT 3 SPRING RESERVOIR W 10FR PVC DRAIN

## (undated) DEVICE — SUT ETHIBOND EXCEL 2 30" OS-4

## (undated) DEVICE — STAPLER SKIN VISI-STAT 35 WIDE

## (undated) DEVICE — GLV 8.5 PROTEXIS (WHITE)

## (undated) DEVICE — POSITIONER FOAM EGG CRATE ULNAR 2PCS (PINK)

## (undated) DEVICE — SYR LUER LOK 10CC

## (undated) DEVICE — GLV 7 PROTEXIS (WHITE)

## (undated) DEVICE — TUBING BRAT 2 SUCTION ASSEMBLY TWIST LOCK

## (undated) DEVICE — DRAPE HIP W POUCHES 87X115X134"

## (undated) DEVICE — DRAPE IOBAN 23" X 23"

## (undated) DEVICE — DRAPE 3/4 SHEET W REINFORCEMENT 56X77"

## (undated) DEVICE — PULLER PLUG ULTRADRIVE  DISP 6 MM

## (undated) DEVICE — PRESSURIZER FEM CNL W/O HUB MED

## (undated) DEVICE — SAW BLADE STRYKER SAGITTAL 3 HOLE OSCILLATING

## (undated) DEVICE — POSITIONER FOAM ABDUCTION PILLOW MED (PINK)

## (undated) DEVICE — NDL HYPO SAFE 18G X 1.5" (PINK)

## (undated) DEVICE — ELCTR BOVIE TIP BLADE VALLEYLAB 6.5"

## (undated) DEVICE — SAW BLADE STRYKER SAGITTAL EXTRA WIDE THIN SHORT

## (undated) DEVICE — SOL IRR BAG NS 0.9% 3000ML

## (undated) DEVICE — SOL IRR POUR H2O 1000ML

## (undated) DEVICE — SUT POLYSORB 0 36" GS-25 UNDYED

## (undated) DEVICE — MARKING PEN W RULER

## (undated) DEVICE — TAPE SILK 3"

## (undated) DEVICE — SUT POLYSORB 1 36" GS-21 UNDYED

## (undated) DEVICE — SUT TICRON 0 30" TIES

## (undated) DEVICE — DRAPE 1/2 SHEET 40X57"

## (undated) DEVICE — SUT POLYSORB 1 36" GS-25

## (undated) DEVICE — MIDAS REX LEGEND BALL FLUTED ACORN LG BORE 6.0MM X 9CM

## (undated) DEVICE — ELCTR AQUAMANTYS BIPOLAR SEALER 6.0

## (undated) DEVICE — SYR LUER LOK 20CC

## (undated) DEVICE — GLV 6.5 PROTEXIS (WHITE)

## (undated) DEVICE — SOL IRR POUR NS 0.9% 500ML

## (undated) DEVICE — PRESSURIZER CMT W/O HUB SM

## (undated) DEVICE — SPECIMEN CONTAINER 100ML

## (undated) DEVICE — WARMING BLANKET UPPER ADULT